# Patient Record
Sex: FEMALE | Race: WHITE | NOT HISPANIC OR LATINO | Employment: OTHER | URBAN - METROPOLITAN AREA
[De-identification: names, ages, dates, MRNs, and addresses within clinical notes are randomized per-mention and may not be internally consistent; named-entity substitution may affect disease eponyms.]

---

## 2018-02-21 ENCOUNTER — TRANSCRIBE ORDERS (OUTPATIENT)
Dept: ADMINISTRATIVE | Facility: HOSPITAL | Age: 68
End: 2018-02-21

## 2018-02-21 ENCOUNTER — HOSPITAL ENCOUNTER (OUTPATIENT)
Dept: CT IMAGING | Facility: HOSPITAL | Age: 68
Discharge: HOME | End: 2018-02-21
Admitting: INTERNAL MEDICINE

## 2018-02-21 DIAGNOSIS — K22.2 ESOPHAGEAL STRICTURE: Primary | ICD-10-CM

## 2018-02-21 DIAGNOSIS — K22.2 ESOPHAGEAL STRICTURE: ICD-10-CM

## 2018-02-21 PROCEDURE — 0 IOPAMIDOL 61 % SOLUTION: Performed by: INTERNAL MEDICINE

## 2018-02-21 PROCEDURE — 82565 ASSAY OF CREATININE: CPT

## 2018-02-21 PROCEDURE — 71260 CT THORAX DX C+: CPT

## 2018-02-21 RX ADMIN — IOPAMIDOL 75 ML: 612 INJECTION, SOLUTION INTRAVENOUS at 11:22

## 2018-02-23 LAB — CREAT BLDA-MCNC: 0.8 MG/DL (ref 0.6–1.3)

## 2018-05-10 ENCOUNTER — OFFICE VISIT (OUTPATIENT)
Dept: PULMONOLOGY | Facility: CLINIC | Age: 68
End: 2018-05-10

## 2018-05-10 VITALS
HEART RATE: 97 BPM | OXYGEN SATURATION: 96 % | BODY MASS INDEX: 17.99 KG/M2 | TEMPERATURE: 98.5 F | DIASTOLIC BLOOD PRESSURE: 74 MMHG | WEIGHT: 108 LBS | RESPIRATION RATE: 18 BRPM | HEIGHT: 65 IN | SYSTOLIC BLOOD PRESSURE: 116 MMHG

## 2018-05-10 DIAGNOSIS — J44.9 CHRONIC OBSTRUCTIVE PULMONARY DISEASE, UNSPECIFIED COPD TYPE (HCC): ICD-10-CM

## 2018-05-10 DIAGNOSIS — R06.02 SHORTNESS OF BREATH: Primary | ICD-10-CM

## 2018-05-10 DIAGNOSIS — R91.8 LUNG NODULES: ICD-10-CM

## 2018-05-10 DIAGNOSIS — R91.8 ABNORMAL CT LUNG SCREENING: ICD-10-CM

## 2018-05-10 PROCEDURE — 94060 EVALUATION OF WHEEZING: CPT | Performed by: INTERNAL MEDICINE

## 2018-05-10 PROCEDURE — 94726 PLETHYSMOGRAPHY LUNG VOLUMES: CPT | Performed by: INTERNAL MEDICINE

## 2018-05-10 PROCEDURE — 99205 OFFICE O/P NEW HI 60 MIN: CPT | Performed by: INTERNAL MEDICINE

## 2018-05-10 PROCEDURE — 94729 DIFFUSING CAPACITY: CPT | Performed by: INTERNAL MEDICINE

## 2018-05-10 RX ORDER — METOPROLOL TARTRATE 50 MG/1
50 TABLET, FILM COATED ORAL DAILY
Status: ON HOLD | COMMUNITY
Start: 2018-01-05 | End: 2020-03-21 | Stop reason: SDUPTHER

## 2018-05-10 RX ORDER — LEVALBUTEROL TARTRATE 45 UG/1
3 AEROSOL, METERED ORAL ONCE
Status: COMPLETED | OUTPATIENT
Start: 2018-05-10 | End: 2018-05-10

## 2018-05-10 RX ORDER — DIAZEPAM 5 MG/1
5 TABLET ORAL EVERY 8 HOURS PRN
COMMUNITY
Start: 2018-01-13

## 2018-05-10 RX ORDER — ASPIRIN 81 MG/1
81 TABLET ORAL EVERY 4 HOURS PRN
COMMUNITY
End: 2019-05-23

## 2018-05-10 RX ORDER — ALBUTEROL SULFATE 90 UG/1
2 AEROSOL, METERED RESPIRATORY (INHALATION) EVERY 4 HOURS PRN
COMMUNITY
Start: 2018-02-13 | End: 2018-11-06 | Stop reason: SDUPTHER

## 2018-05-10 RX ORDER — TRAZODONE HYDROCHLORIDE 100 MG/1
100 TABLET ORAL AS NEEDED
COMMUNITY
Start: 2018-01-21

## 2018-05-10 RX ORDER — ASCORBIC ACID 500 MG
500 TABLET ORAL 2 TIMES DAILY
COMMUNITY

## 2018-05-10 RX ADMIN — LEVALBUTEROL TARTRATE 3 PUFF: 45 AEROSOL, METERED ORAL at 10:02

## 2018-05-10 NOTE — PROGRESS NOTES
CC:    Lung nodules / COPD    HPI:    68 y/o WF w/ complex PMH:  120 py smoking quit 2010, COPD, NSCLC s/p resection RLL 2000 (presumeably early stage - no chemo/RT), Hodgkins Lymphoma 1980 s/p chest RT and spleenectomy, Ovarian Ca 1975 s/p RT, hypothyroidism, achalasia referred by Dr. Pelletier for lung nodules.  She recently had an esophageal dilation followed by CT chest in Feb that showed achalasia.  In addition the CT scan showed several sub-centimeter scattered nodules.  Patient quit smoking as mentioned above in 2010.  She does report significant ZHONG with minimal activity (showering, dressing, ADL's, walking up steps).  Does ok walking on a flat surface.  No other complaints.    PMH:    Past Medical History:   Diagnosis Date   • Cancer     lung   • Hodgkin's disease    • Hypertension    • Lung cancer    • Ovarian cancer      PSH:    Past Surgical History:   Procedure Laterality Date   • CATARACT EXTRACTION     • CATARACT EXTRACTION W/ INTRAOCULAR LENS IMPLANTW/ TRABECULECTOMY     • HIP ARTHROSCOPY     • HYSTERECTOMY     • LUNG SURGERY     • SPLENECTOMY     • THYROIDECTOMY       FH:    Family History   Problem Relation Age of Onset   • Cancer Mother    • Heart disease Father    • Heart disease Brother    • Cancer Maternal Grandmother      SH:    Social History     Social History   • Marital status:      Spouse name: N/A   • Number of children: N/A   • Years of education: N/A     Occupational History   • Not on file.     Social History Main Topics   • Smoking status: Former Smoker     Quit date: 5/10/2010   • Smokeless tobacco: Never Used   • Alcohol use Yes   • Drug use: No   • Sexual activity: Defer     Other Topics Concern   • Not on file     Social History Narrative   • No narrative on file     ALLERGIES:    No Known Allergies  MEDICATIONS:      Current Outpatient Prescriptions:   •  aspirin 81 MG EC tablet, Take 81 mg by mouth Every 4 (Four) Hours As Needed., Disp: , Rfl:   •  diazePAM (VALIUM) 5 MG  tablet, if needed., Disp: , Rfl:   •  metoprolol tartrate (LOPRESSOR) 50 MG tablet, Take 25 mg by mouth Daily., Disp: , Rfl:   •  traZODone (DESYREL) 100 MG tablet, Take 100 mg by mouth As Needed., Disp: , Rfl:   •  VENTOLIN  (90 Base) MCG/ACT inhaler, Inhale 2 puffs Every 4 (Four) Hours As Needed., Disp: , Rfl:   •  vitamin C (ASCORBIC ACID) 500 MG tablet, Take 500 mg by mouth 2 (Two) Times a Day., Disp: , Rfl:   No current facility-administered medications for this visit.   ROS:  Per HPI, otherwise all systems reviewed and negative.    DIAGNOSTIC DATA (Reviewed and interpreted by me unless otherwise specified):    PFT 5/10/18 - severe obstruction, no change w/ BD, air trapping, normal DLCO    CT Chest 2/21/18 - prior RLL lobectomy, radiation changes near mediastinum bilateral upper lobes, emphysema, multiple sub-centimeter nodules, achalasia w/ fluid filled esophagus, prior spleenectomy    Vitals:    05/10/18 1018   BP: 116/74   Pulse: 97   Resp: 18   Temp: 98.5 °F (36.9 °C)   SpO2: 96%       Physical Exam   Constitutional: Oriented to person, place, and time. Appears well-developed and well-nourished.   Head: Normocephalic and atraumatic.   Nose: Nose normal.   Mouth/Throat: Oropharynx is clear and moist.   Eyes: Conjunctivae are normal.  Pupils normal.  Neck: No tracheal deviation present.   Cardiovascular: Normal rate, regular rhythm, normal heart sounds and intact distal pulses.  Exam reveals no gallop and no friction rub.  No thrill.  No JVD.  No edema.  No murmur heard.  Pulmonary/Chest: Effort normal and breath sounds normal.  No tenderness to palpation.  No clubbing.   Abdominal: Soft. Bowel sounds are normal. No distension. No tenderness. There is no guarding.   Musculoskeletal: Normal range of motion.  No tenderness.  Lymphadenopathy:  No cervical adenopathy.   Neurological:  No new focal neurological deficits observed   Skin: Skin is warm and dry. No rash noted.   Psychiatric: Normal mood and  affect.  Behavior is normal. Judgment normal.    Assessment/Plan     1)  GOLD 3C COPD - start stiolto, already quit smoking, sat 96% on RA, doubt she needs any O2 w/ exertion / sleep.  High risk for pneumonia w/ achalasia and fluid filled esophagus.    2)  Lung Nodules - sub-centimeter, largest 6 mm, prior lung cancer, repeat CT now (3 months) and again in 6 months.  RTC after 6 month scan.      RTC 6 months, will call if any changes on 3 month CT    Gage Collier MD  Pulmonology and Critical Care Medicine  05/10/18 10:49 AM  Electronically Signed    C.C.:  Erik Pelletier MD, No Known Provider

## 2018-05-11 DIAGNOSIS — R91.1 LUNG NODULE: Primary | ICD-10-CM

## 2018-08-08 ENCOUNTER — HOSPITAL ENCOUNTER (OUTPATIENT)
Dept: CT IMAGING | Facility: HOSPITAL | Age: 68
Discharge: HOME | End: 2018-08-08
Attending: INTERNAL MEDICINE | Admitting: INTERNAL MEDICINE

## 2018-08-08 DIAGNOSIS — R91.1 LUNG NODULE: ICD-10-CM

## 2018-08-08 PROCEDURE — 71250 CT THORAX DX C-: CPT

## 2018-08-14 ENCOUNTER — OFFICE VISIT (OUTPATIENT)
Dept: PULMONOLOGY | Facility: CLINIC | Age: 68
End: 2018-08-14

## 2018-08-14 VITALS
HEIGHT: 65 IN | HEART RATE: 117 BPM | OXYGEN SATURATION: 96 % | SYSTOLIC BLOOD PRESSURE: 136 MMHG | WEIGHT: 117 LBS | DIASTOLIC BLOOD PRESSURE: 84 MMHG | BODY MASS INDEX: 19.49 KG/M2 | TEMPERATURE: 99.1 F | RESPIRATION RATE: 20 BRPM

## 2018-08-14 DIAGNOSIS — Z85.118 HISTORY OF LUNG CANCER: ICD-10-CM

## 2018-08-14 DIAGNOSIS — J98.4 CAVITARY LESION OF LUNG: Primary | ICD-10-CM

## 2018-08-14 DIAGNOSIS — J44.9 CHRONIC OBSTRUCTIVE PULMONARY DISEASE, UNSPECIFIED COPD TYPE (HCC): ICD-10-CM

## 2018-08-14 PROBLEM — K22.0 ACHALASIA: Status: ACTIVE | Noted: 2018-05-18

## 2018-08-14 PROCEDURE — 99214 OFFICE O/P EST MOD 30 MIN: CPT | Performed by: NURSE PRACTITIONER

## 2018-08-14 NOTE — PROGRESS NOTES
[facility] Pulmonary Follow up    CHIEF COMPLAINT    Abnormal CT scan, COPD    HISTORY OF PRESENT ILLNESS    Ita Pickett is a 67 y.o.female  former smoker with COPD and a history of non-small cell lung cancer status post resection right lower lobe 2000 here today for follow-up after abnormal CT of the chest.    She had esophageal dilation for achalasia earlier this year.  She underwent a CT of the chest in February afterwards which showed several subcentimeter scattered nodules and was then referred to our office where she saw Dr. Gage Collier.  She underwent a 6 month follow-up CT of the chest and returns today to discuss results.    Since her last visit in office, Dr. Pelletier has referred her to [facility].  She has undergone several attempts at a repeat esophageal dilation.  They have been unsuccessful and she currently has a PEG tube in place.    She remains on Stiolto but does rely on samples.  She still has dyspnea with even minimal activity.    Patient Active Problem List   Diagnosis   • COPD (chronic obstructive pulmonary disease) (CMS/HCC)   • Achalasia   • History of lung cancer       No Known Allergies    Current Outpatient Prescriptions:   •  aspirin 81 MG EC tablet, Take 81 mg by mouth Every 4 (Four) Hours As Needed., Disp: , Rfl:   •  diazePAM (VALIUM) 5 MG tablet, if needed., Disp: , Rfl:   •  metoprolol tartrate (LOPRESSOR) 50 MG tablet, Take 25 mg by mouth Daily., Disp: , Rfl:   •  tiotropium bromide-olodaterol (STIOLTO RESPIMAT) 2.5-2.5 MCG/ACT aerosol solution inhaler, Inhale 2 puffs Daily., Disp: 1 inhaler, Rfl: 11  •  traZODone (DESYREL) 100 MG tablet, Take 100 mg by mouth As Needed., Disp: , Rfl:   •  VENTOLIN  (90 Base) MCG/ACT inhaler, Inhale 2 puffs Every 4 (Four) Hours As Needed., Disp: , Rfl:   •  vitamin C (ASCORBIC ACID) 500 MG tablet, Take 500 mg by mouth 2 (Two) Times a Day., Disp: , Rfl:   MEDICATION LIST AND ALLERGIES REVIEWED.    Social History   Substance Use  "Topics   • Smoking status: Former Smoker     Quit date: 5/10/2010   • Smokeless tobacco: Never Used   • Alcohol use Yes       FAMILY AND SOCIAL HISTORY REVIEWED.    Review of Systems   Constitutional: Negative.    HENT: Positive for trouble swallowing.    Eyes: Negative.    Respiratory: Positive for shortness of breath.    Cardiovascular: Negative.    Genitourinary: Negative.    Musculoskeletal: Negative.    Skin: Negative.    Neurological: Negative.    Psychiatric/Behavioral: Negative for confusion, self-injury and suicidal ideas. The patient is nervous/anxious. The patient is not hyperactive.    .    /84 (BP Location: Left arm, Patient Position: Sitting, Cuff Size: Adult)   Pulse 117   Temp 99.1 °F (37.3 °C)   Resp 20   Ht 163.8 cm (64.5\")   Wt 53.1 kg (117 lb)   SpO2 96%   BMI 19.77 kg/m²     Immunization History   Administered Date(s) Administered   • Influenza, Unspecified 12/01/2017   • Pneumococcal Polysaccharide (PPSV23) 12/01/2017       Physical Exam   Constitutional: She is oriented to person, place, and time. She appears well-developed. No distress.   HENT:   Head: Normocephalic and atraumatic.   Neck: Neck supple.   Cardiovascular: Normal rate and regular rhythm.    No murmur heard.  Pulmonary/Chest: Effort normal. No stridor. No respiratory distress. She has no wheezes. She has no rales.   Abdominal: Soft. She exhibits no distension.   PEG tube in place   Musculoskeletal: Normal range of motion. She exhibits no edema.   Neurological: She is alert and oriented to person, place, and time.   Skin: Skin is warm and dry.   Psychiatric: She has a normal mood and affect. Her behavior is normal.   Vitals reviewed.        RESULTS    Ct Chest Without Contrast    Result Date: 8/8/2018  1. Status post right lower lobectomy. 2. New worrisome cavitary lesion in the base of the upper lobe with broad contact along the minor fissure measures up to 6.5 cm (anterior-posterior dimension). This would be " amenable to percutaneous sampling. 3. The other nodules are either unchanged (smaller solid lesion right upper lobe) or actually decreased in size (left upper lobe). 4. Other incidental findings as discussed.  D:  08/08/2018 E:  08/08/2018     This report was finalized on 8/8/2018 1:11 PM by Erik Sales.        PROBLEM LIST    Problem List Items Addressed This Visit        Respiratory    COPD (chronic obstructive pulmonary disease) (CMS/[facility])    Overview     Overview:   History: COPD.  PTA was on Stiolto inhalers  Assessment:   98% on room air  Plan:    - monitor sats  - Spiriva while inhouse  - cough and deep breath, Vibrapep  .            Other    History of lung cancer      Other Visit Diagnoses     Cavitary lesion of lung    -  Primary    Relevant Orders    CT Needle Biopsy Lung Right            DISCUSSION    Cavitary lesion- we reviewed her recent CT of the chest.  This is amenable to CT-guided biopsy.  We'll arrange for her to have this done hopefully this week or next as she does have to return to [facility] in 2 weeks.  She is quite anxious about the procedure.  She has Valium available at home but feels like this actually makes her more nervous. KARL report reviewed.  She was given a written prescription for Xanax 0.25 mg #2.     COPD- remain on Stiolto.  She was provided with several samples.    I'll follow up with her in about 2-3 weeks.  If this is not feasible due to her traveling schedule, I'll contact her with preliminary biopsy results.    I spent 25 minutes with the patient. I spent > 50% percent of this time counseling and discussing diagnosis, diagnostic testing, current status and treatment options.    EDWARDO Matamoros  08/14/20189:37 AM  Electronically signed     Please note that portions of this note were completed with a voice recognition program. Efforts were made to edit the dictations, but occasionally words are mistranscribed.      CC: Provider, No Known

## 2018-08-17 ENCOUNTER — HOSPITAL ENCOUNTER (OUTPATIENT)
Dept: CT IMAGING | Facility: HOSPITAL | Age: 68
Discharge: HOME | End: 2018-08-17
Admitting: NURSE PRACTITIONER

## 2018-08-17 VITALS
RESPIRATION RATE: 20 BRPM | HEIGHT: 65 IN | SYSTOLIC BLOOD PRESSURE: 146 MMHG | HEART RATE: 122 BPM | DIASTOLIC BLOOD PRESSURE: 90 MMHG | TEMPERATURE: 97.8 F | BODY MASS INDEX: 19.13 KG/M2 | OXYGEN SATURATION: 94 % | WEIGHT: 114.8 LBS

## 2018-08-17 DIAGNOSIS — J98.4 CAVITARY LESION OF LUNG: ICD-10-CM

## 2018-08-17 LAB
APTT PPP: 24.7 SECONDS (ref 24–31)
CREAT BLDA-MCNC: NORMAL MG/DL (ref 0.6–1.3)
INR PPP: 0.96 (ref 0.91–1.09)
PLATELET # BLD AUTO: 487 10*3/MM3 (ref 150–450)
PROTHROMBIN TIME: 10.1 SECONDS (ref 9.6–11.5)

## 2018-08-17 PROCEDURE — 85730 THROMBOPLASTIN TIME PARTIAL: CPT | Performed by: RADIOLOGY

## 2018-08-17 PROCEDURE — 85049 AUTOMATED PLATELET COUNT: CPT | Performed by: RADIOLOGY

## 2018-08-17 PROCEDURE — 77012 CT SCAN FOR NEEDLE BIOPSY: CPT

## 2018-08-17 PROCEDURE — 85610 PROTHROMBIN TIME: CPT | Performed by: RADIOLOGY

## 2018-08-17 RX ORDER — SODIUM CHLORIDE 0.9 % (FLUSH) 0.9 %
1-10 SYRINGE (ML) INJECTION AS NEEDED
Status: DISCONTINUED | OUTPATIENT
Start: 2018-08-17 | End: 2018-08-18 | Stop reason: HOSPADM

## 2018-08-17 RX ORDER — ALPRAZOLAM 0.25 MG/1
0.25 TABLET ORAL ONCE AS NEEDED
COMMUNITY
End: 2019-05-23

## 2018-08-17 NOTE — NURSING NOTE
Returned to room. No procedure done. Biopsy site improved per Dr Siegel and biopsy deferred. Pt denies complaints. Provided full liquids.

## 2018-08-17 NOTE — DISCHARGE INSTR - LAB
Keep all your scheduled appointments. Call Dr Collier Monday in the office if you haven't heard from him.

## 2018-08-21 ENCOUNTER — TELEPHONE (OUTPATIENT)
Dept: PULMONOLOGY | Facility: CLINIC | Age: 68
End: 2018-08-21

## 2018-08-21 NOTE — TELEPHONE ENCOUNTER
Discussed with Ms Pickett.  I let her know that I would like to have Dr. Collier review her scan and see if he feels like she needs an additional CT prior to her follow-up in November or if she needs a sooner follow-up appointment.  She was treated for pneumonia about 6 weeks ago.  She is curious if she needs additional antibiotics.  At this time she continues to be short of breath but has no additional symptoms such as fever, chills, or chest pain.  She has a cough which is occasionally productive but she is unsure if she'll be able to provide us with a sputum sample.  I let her know that I likely would not have answer from Dr. Collier until next week.  I will contact her with updates once available.      ----- Message from EDWARDO Espinoza sent at 8/20/2018  3:50 PM EDT -----  She did not have a biopsy on the 17th due to the nodule being smaller.  She now wants to know what she should do.  Will you call her and let her know what she should do now?    [phone]    Thanks.

## 2018-11-06 ENCOUNTER — OFFICE VISIT (OUTPATIENT)
Dept: PULMONOLOGY | Facility: CLINIC | Age: 68
End: 2018-11-06

## 2018-11-06 VITALS
DIASTOLIC BLOOD PRESSURE: 84 MMHG | RESPIRATION RATE: 18 BRPM | WEIGHT: 124.25 LBS | HEIGHT: 65 IN | BODY MASS INDEX: 20.7 KG/M2 | OXYGEN SATURATION: 96 % | SYSTOLIC BLOOD PRESSURE: 124 MMHG | TEMPERATURE: 98.7 F | HEART RATE: 86 BPM

## 2018-11-06 DIAGNOSIS — J44.1 COPD EXACERBATION (HCC): ICD-10-CM

## 2018-11-06 DIAGNOSIS — R91.8 LUNG NODULES: ICD-10-CM

## 2018-11-06 DIAGNOSIS — R06.02 SOB (SHORTNESS OF BREATH): Primary | ICD-10-CM

## 2018-11-06 DIAGNOSIS — J30.9 ALLERGIC RHINITIS, UNSPECIFIED SEASONALITY, UNSPECIFIED TRIGGER: ICD-10-CM

## 2018-11-06 PROCEDURE — 99214 OFFICE O/P EST MOD 30 MIN: CPT | Performed by: INTERNAL MEDICINE

## 2018-11-06 RX ORDER — ALBUTEROL SULFATE 90 UG/1
2 AEROSOL, METERED RESPIRATORY (INHALATION) EVERY 4 HOURS PRN
Qty: 1 INHALER | Refills: 11 | Status: SHIPPED | OUTPATIENT
Start: 2018-11-06 | End: 2019-05-23

## 2018-11-06 RX ORDER — MONTELUKAST SODIUM 10 MG/1
10 TABLET ORAL NIGHTLY
Qty: 30 TABLET | Refills: 11 | Status: SHIPPED | OUTPATIENT
Start: 2018-11-06 | End: 2019-02-25 | Stop reason: SDUPTHER

## 2018-11-06 RX ORDER — DOXYCYCLINE HYCLATE 50 MG/1
100 CAPSULE ORAL 2 TIMES DAILY
Qty: 20 CAPSULE | Refills: 0 | Status: SHIPPED | OUTPATIENT
Start: 2018-11-06 | End: 2018-11-11

## 2018-11-06 RX ORDER — PREDNISOLONE SODIUM PHOSPHATE 15 MG/5ML
40 SOLUTION ORAL DAILY
Qty: 75 ML | Refills: 0 | Status: SHIPPED | OUTPATIENT
Start: 2018-11-06 | End: 2019-02-25

## 2018-11-06 RX ORDER — OMEPRAZOLE 20 MG/1
20 CAPSULE, DELAYED RELEASE ORAL DAILY
COMMUNITY
Start: 2018-10-16 | End: 2022-08-17

## 2018-11-06 NOTE — PROGRESS NOTES
CC:    Lung nodules / COPD    HPI:    66 y/o WF w/ complex PMH:  120 py smoking quit 2010, COPD, NSCLC s/p resection RLL 2000 (presumeably early stage - no chemo/RT), Hodgkins Lymphoma 1980 s/p chest RT and spleenectomy, Ovarian Ca 1975 s/p RT, hypothyroidism, achalasia referred by Dr. Pelletier for lung nodules.  She recently had an esophageal dilation followed by CT chest in Feb that showed achalasia.  In addition the CT scan showed several sub-centimeter scattered nodules.  Patient quit smoking as mentioned above in 2010.  She does report significant ZHONG with minimal activity (showering, dressing, ADL's, walking up steps).  Does ok walking on a flat surface.  No other complaints.    INTERVAL HISTORY:    Patient returns for follow up.  Her repeat CT showed a chest wall based cavitary RML lesion.  Radiology recommended CT guided biopsy.  She went for this and the lesion decreased in size so the biopsy was cancelled.   She had a PEG placed for her severe esophageal stenosis and continues to get repeat dilations.  Today she complains of cough, congestion, wheezing, rhinorrhea, and sore throat.  No fevers.    PMH:    Past Medical History:   Diagnosis Date   • Cancer (CMS/HCC)     lung   • Dysrhythmia     tachycardia   • Hodgkin's disease (CMS/HCC)    • Hypertension    • Lung cancer (CMS/HCC)    • Ovarian cancer (CMS/HCC)      PSH:    Past Surgical History:   Procedure Laterality Date   • CATARACT EXTRACTION     • CATARACT EXTRACTION W/ INTRAOCULAR LENS IMPLANTW/ TRABECULECTOMY     • HIP ARTHROSCOPY     • HYSTERECTOMY     • LUNG SURGERY     • SPLENECTOMY     • THYROIDECTOMY       FH:    Family History   Problem Relation Age of Onset   • Cancer Mother    • Heart disease Father    • Heart disease Brother    • Cancer Maternal Grandmother      SH:    Social History     Social History   • Marital status:      Spouse name: N/A   • Number of children: N/A   • Years of education: N/A     Occupational History   • Not on  file.     Social History Main Topics   • Smoking status: Former Smoker     Quit date: 5/10/2010   • Smokeless tobacco: Never Used   • Alcohol use Yes   • Drug use: No   • Sexual activity: Defer     Other Topics Concern   • Not on file     Social History Narrative   • No narrative on file     ALLERGIES:    No Known Allergies  MEDICATIONS:      Current Outpatient Prescriptions:   •  ALPRAZolam (XANAX) 0.25 MG tablet, Take 0.25 mg by mouth 1 (One) Time As Needed for Anxiety (procedure)., Disp: , Rfl:   •  aspirin 81 MG EC tablet, Take 81 mg by mouth Every 4 (Four) Hours As Needed., Disp: , Rfl:   •  diazePAM (VALIUM) 5 MG tablet, if needed., Disp: , Rfl:   •  ibuprofen (ADVIL,MOTRIN) 100 MG/5ML suspension, Take  by mouth Every 8 (Eight) Hours As Needed for Mild Pain ., Disp: , Rfl:   •  metoprolol tartrate (LOPRESSOR) 50 MG tablet, Take 50 mg by mouth Daily., Disp: , Rfl:   •  omeprazole (priLOSEC) 20 MG capsule, Take 20 mg by mouth Daily., Disp: , Rfl:   •  tiotropium bromide-olodaterol (STIOLTO RESPIMAT) 2.5-2.5 MCG/ACT aerosol solution inhaler, Inhale 2 puffs Daily., Disp: 1 inhaler, Rfl: 11  •  traZODone (DESYREL) 100 MG tablet, Take 100 mg by mouth As Needed., Disp: , Rfl:   •  VENTOLIN  (90 Base) MCG/ACT inhaler, Inhale 2 puffs Every 4 (Four) Hours As Needed for Wheezing or Shortness of Air., Disp: 1 inhaler, Rfl: 11  •  vitamin C (ASCORBIC ACID) 500 MG tablet, Take 500 mg by mouth 2 (Two) Times a Day., Disp: , Rfl:   •  doxycycline (VIBRAMYCIN) 50 MG capsule, Take 2 capsules by mouth 2 (Two) Times a Day for 5 days., Disp: 20 capsule, Rfl: 0  •  montelukast (SINGULAIR) 10 MG tablet, Take 1 tablet by mouth Every Night., Disp: 30 tablet, Rfl: 11  •  prednisoLONE (ORAPRED) 15 MG/5ML solution, Take 13.3 mL by mouth Daily., Disp: 75 mL, Rfl: 0  ROS:  Per HPI, otherwise all systems reviewed and negative.    DIAGNOSTIC DATA (Reviewed and interpreted by me unless otherwise specified):    PFT 5/10/18 - severe  obstruction, no change w/ BD, air trapping, normal DLCO    CT Chest 2/21/18 - prior RLL lobectomy, radiation changes near mediastinum bilateral upper lobes, emphysema, multiple sub-centimeter nodules, achalasia w/ fluid filled esophagus, prior spleenectomy    CXR 11/6/18 - prior lobectomy, improving RML nodule, no infiltrates    Vitals:    11/06/18 0919   BP: 124/84   Pulse: 86   Resp: 18   Temp: 98.7 °F (37.1 °C)   SpO2: 96%       Physical Exam   Constitutional: Oriented to person, place, and time. Appears well-developed and well-nourished.   Head: Normocephalic and atraumatic.   Nose: Nose normal.   Mouth/Throat: Oropharynx w/ posterior cobblestoning.   Eyes: Conjunctivae are normal.  Pupils normal.  Neck: No tracheal deviation present.   Cardiovascular: Normal rate, regular rhythm, normal heart sounds and intact distal pulses.  Exam reveals no gallop and no friction rub.  No thrill.  No JVD.  No edema.  No murmur heard.  Pulmonary/Chest: Effort normal and breath sounds w/ wheezing bilaterally.  No tenderness to palpation.  No clubbing.   Abdominal: Soft. Bowel sounds are normal. No distension. No tenderness. There is no guarding.   Musculoskeletal: Normal range of motion.  No tenderness.  Lymphadenopathy:  No cervical adenopathy.   Neurological:  No new focal neurological deficits observed   Skin: Skin is warm and dry. No rash noted.   Psychiatric: Normal mood and affect.  Behavior is normal. Judgment normal.    Assessment/Plan     1)  GOLD 3C COPD - continue stiolto, already quit smoking, sat 96% on RA, doubt she needs any O2 w/ exertion / sleep.  High risk for pneumonia w/ achalasia and fluid filled esophagus.  For Acute exacerbation give prednisolone and doxycycline x 5 days.    2)  Lung Nodules  / RML pneumonia - sub-centimeter, largest 6 mm, prior lung cancer, also w/ improving RML process.  Repeat CT in Feb 2019.    3)  Allergic Rhinitis - flonase, anti-histamine, singulair    RTC 3 months w/ CT    Gage  MD Nando  Pulmonology and Critical Care Medicine  11/06/18 9:58 AM  Electronically Signed    C.C.:  No ref. provider found, Provider, No Known

## 2018-11-07 DIAGNOSIS — R91.8 LUNG NODULES: Primary | ICD-10-CM

## 2019-02-06 ENCOUNTER — HOSPITAL ENCOUNTER (OUTPATIENT)
Dept: CT IMAGING | Facility: HOSPITAL | Age: 69
Discharge: HOME | End: 2019-02-06
Attending: INTERNAL MEDICINE | Admitting: INTERNAL MEDICINE

## 2019-02-06 DIAGNOSIS — R91.8 LUNG NODULES: ICD-10-CM

## 2019-02-06 PROCEDURE — 71250 CT THORAX DX C-: CPT

## 2019-02-25 ENCOUNTER — OFFICE VISIT (OUTPATIENT)
Dept: PULMONOLOGY | Facility: CLINIC | Age: 69
End: 2019-02-25

## 2019-02-25 VITALS
BODY MASS INDEX: 20.22 KG/M2 | DIASTOLIC BLOOD PRESSURE: 62 MMHG | HEART RATE: 87 BPM | OXYGEN SATURATION: 95 % | WEIGHT: 121.38 LBS | SYSTOLIC BLOOD PRESSURE: 112 MMHG | TEMPERATURE: 98 F | RESPIRATION RATE: 18 BRPM | HEIGHT: 65 IN

## 2019-02-25 DIAGNOSIS — J44.9 CHRONIC OBSTRUCTIVE PULMONARY DISEASE, UNSPECIFIED COPD TYPE (HCC): ICD-10-CM

## 2019-02-25 DIAGNOSIS — R91.8 LUNG NODULES: Primary | ICD-10-CM

## 2019-02-25 PROCEDURE — 99214 OFFICE O/P EST MOD 30 MIN: CPT | Performed by: INTERNAL MEDICINE

## 2019-02-25 RX ORDER — MONTELUKAST SODIUM 10 MG/1
10 TABLET ORAL NIGHTLY
Qty: 30 TABLET | Refills: 11 | Status: SHIPPED | OUTPATIENT
Start: 2019-02-25 | End: 2020-03-04 | Stop reason: SDUPTHER

## 2019-02-25 NOTE — PROGRESS NOTES
CC:    Lung nodules / COPD    HPI:    68 y/o WF w/ complex PMH:  120 py smoking quit 2010, COPD, NSCLC s/p resection RLL 2000 (presumeably early stage - no chemo/RT), Hodgkins Lymphoma 1980 s/p chest RT and spleenectomy, Ovarian Ca 1975 s/p RT, hypothyroidism, achalasia referred by Dr. Pelletier for lung nodules.  She recently had an esophageal dilation followed by CT chest in Feb that showed achalasia.  In addition the CT scan showed several sub-centimeter scattered nodules.  Patient quit smoking as mentioned above in 2010.  She does report significant ZHONG with minimal activity (showering, dressing, ADL's, walking up steps).  Does ok walking on a flat surface.  No other complaints.    Repeat CT showed a chest wall based cavitary RML lesion.  Radiology recommended CT guided biopsy.  She went for this and the lesion decreased in size so the biopsy was cancelled.   She had a PEG placed for her severe esophageal stenosis and continues to get repeat dilations.    INTERVAL HISTORY:    Patient returns for follow up.  Allergies are well controlled.  Breathing ok with Stiolto.  She was just diagnosed with right sided breast cancer and is being evaluated for surgery.  Comes today w/ repeat CT.    PMH:    Past Medical History:   Diagnosis Date   • Cancer (CMS/HCC)     lung   • Dysrhythmia     tachycardia   • Hodgkin's disease (CMS/HCC)    • Hypertension    • Lung cancer (CMS/HCC)    • Ovarian cancer (CMS/HCC)      PSH:    Past Surgical History:   Procedure Laterality Date   • CATARACT EXTRACTION     • CATARACT EXTRACTION W/ INTRAOCULAR LENS IMPLANTW/ TRABECULECTOMY     • HIP ARTHROSCOPY     • HYSTERECTOMY     • LUNG SURGERY     • SPLENECTOMY     • THYROIDECTOMY       FH:    Family History   Problem Relation Age of Onset   • Cancer Mother    • Heart disease Father    • Heart disease Brother    • Cancer Maternal Grandmother      SH:    Social History     Socioeconomic History   • Marital status:      Spouse name: Not on  file   • Number of children: Not on file   • Years of education: Not on file   • Highest education level: Not on file   Social Needs   • Financial resource strain: Not on file   • Food insecurity - worry: Not on file   • Food insecurity - inability: Not on file   • Transportation needs - medical: Not on file   • Transportation needs - non-medical: Not on file   Occupational History   • Not on file   Tobacco Use   • Smoking status: Former Smoker     Last attempt to quit: 5/10/2010     Years since quittin.8   • Smokeless tobacco: Never Used   Substance and Sexual Activity   • Alcohol use: Yes   • Drug use: No   • Sexual activity: Defer   Other Topics Concern   • Not on file   Social History Narrative   • Not on file     ALLERGIES:    No Known Allergies  MEDICATIONS:      Current Outpatient Medications:   •  ALPRAZolam (XANAX) 0.25 MG tablet, Take 0.25 mg by mouth 1 (One) Time As Needed for Anxiety (procedure)., Disp: , Rfl:   •  aspirin 81 MG EC tablet, Take 81 mg by mouth Every 4 (Four) Hours As Needed., Disp: , Rfl:   •  diazePAM (VALIUM) 5 MG tablet, if needed., Disp: , Rfl:   •  ibuprofen (ADVIL,MOTRIN) 100 MG/5ML suspension, Take  by mouth Every 8 (Eight) Hours As Needed for Mild Pain ., Disp: , Rfl:   •  metoprolol tartrate (LOPRESSOR) 50 MG tablet, Take 50 mg by mouth Daily., Disp: , Rfl:   •  montelukast (SINGULAIR) 10 MG tablet, Take 1 tablet by mouth Every Night., Disp: 30 tablet, Rfl: 11  •  omeprazole (priLOSEC) 20 MG capsule, Take 20 mg by mouth Daily., Disp: , Rfl:   •  tiotropium bromide-olodaterol (STIOLTO RESPIMAT) 2.5-2.5 MCG/ACT aerosol solution inhaler, Inhale 2 puffs Daily., Disp: 1 inhaler, Rfl: 11  •  traZODone (DESYREL) 100 MG tablet, Take 100 mg by mouth As Needed., Disp: , Rfl:   •  VENTOLIN  (90 Base) MCG/ACT inhaler, Inhale 2 puffs Every 4 (Four) Hours As Needed for Wheezing or Shortness of Air., Disp: 1 inhaler, Rfl: 11  •  vitamin C (ASCORBIC ACID) 500 MG tablet, Take 500 mg by  mouth 2 (Two) Times a Day., Disp: , Rfl:   ROS:  Per HPI, otherwise all systems reviewed and negative.    DIAGNOSTIC DATA (Reviewed and interpreted by me unless otherwise specified):    PFT 5/10/18 - severe obstruction, no change w/ BD, air trapping, normal DLCO    CT Chest 2/21/18 - prior RLL lobectomy, radiation changes near mediastinum bilateral upper lobes, emphysema, multiple sub-centimeter nodules, achalasia w/ fluid filled esophagus, prior spleenectomy    CT Chest 2/6/19 - stable from prior, sub-centimeter nodules stable    CXR 11/6/18 - prior lobectomy, improving RML nodule, no infiltrates    Vitals:    02/25/19 1351   BP: 112/62   Pulse: 87   Resp: 18   Temp: 98 °F (36.7 °C)   SpO2: 95%       Physical Exam   Constitutional: Oriented to person, place, and time. Appears well-developed and well-nourished.   Head: Normocephalic and atraumatic.   Nose: Nose normal.   Mouth/Throat: normal.  Eyes: Conjunctivae are normal.  Pupils normal.  Neck: No tracheal deviation present.   Cardiovascular: Normal rate, regular rhythm, normal heart sounds and intact distal pulses.  Exam reveals no gallop and no friction rub.  No thrill.  No JVD.  No edema.  No murmur heard.  Pulmonary/Chest: Effort normal and breath sounds clear.  No tenderness to palpation.  No clubbing.   Abdominal: Soft. Bowel sounds are normal. No distension. No tenderness. There is no guarding.   Musculoskeletal: Normal range of motion.  No tenderness.  Lymphadenopathy:  No cervical adenopathy.   Neurological:  No new focal neurological deficits observed   Skin: Skin is warm and dry. No rash noted.   Psychiatric: Normal mood and affect.  Behavior is normal. Judgment normal.    Assessment/Plan     1)  GOLD 3C COPD - continue stiolto, already quit smoking.  High risk for pneumonia w/ achalasia and fluid filled esophagus.    2)  Lung Nodules - sub-centimeter, follow for 2 years w/ final CT 2/2020.    3)  Allergic Rhinitis - flonase, anti-histamine,  singulair    RTC 1 year w/ CT    Gage Collier MD  Pulmonology and Critical Care Medicine  02/25/19 2:30 PM  Electronically Signed    C.C.:  No ref. provider found, Lucho Peters MD

## 2019-05-23 ENCOUNTER — OFFICE VISIT (OUTPATIENT)
Dept: RADIATION ONCOLOGY | Facility: HOSPITAL | Age: 69
End: 2019-05-23

## 2019-05-23 ENCOUNTER — HOSPITAL ENCOUNTER (OUTPATIENT)
Dept: RADIATION ONCOLOGY | Facility: HOSPITAL | Age: 69
Setting detail: RADIATION/ONCOLOGY SERIES
Discharge: HOME | End: 2019-05-23

## 2019-05-23 VITALS
BODY MASS INDEX: 20.72 KG/M2 | RESPIRATION RATE: 20 BRPM | HEART RATE: 87 BPM | DIASTOLIC BLOOD PRESSURE: 61 MMHG | TEMPERATURE: 98.8 F | WEIGHT: 122.6 LBS | OXYGEN SATURATION: 98 % | SYSTOLIC BLOOD PRESSURE: 138 MMHG

## 2019-05-23 DIAGNOSIS — Z17.0 MALIGNANT NEOPLASM OF UPPER-OUTER QUADRANT OF RIGHT BREAST IN FEMALE, ESTROGEN RECEPTOR POSITIVE (HCC): Primary | ICD-10-CM

## 2019-05-23 DIAGNOSIS — C50.411 MALIGNANT NEOPLASM OF UPPER-OUTER QUADRANT OF RIGHT BREAST IN FEMALE, ESTROGEN RECEPTOR POSITIVE (HCC): Primary | ICD-10-CM

## 2019-05-23 PROBLEM — C50.919 BREAST CANCER: Status: ACTIVE | Noted: 2019-05-23

## 2019-05-23 PROCEDURE — G0463 HOSPITAL OUTPT CLINIC VISIT: HCPCS

## 2019-05-23 NOTE — PROGRESS NOTES
CONSULTATION NOTE      :                                                          1950  DATE OF CONSULTATION:                       2019   REQUESTING PHYSICIAN:                   Anatoly Jay MD  REASON FOR CONSULTATION:           Breast cancer (CMS/[facility])  Staging form: Breast, AJCC 8th Edition  - Clinical stage from 2018: Stage 0 (cTis (DCIS), cN0, cM0, ER: Positive, VA: Positive, HER2: Unknown) - Signed by Nacho Hanna MD on 2019  - Pathologic stage from 2019: Stage 0 (pTis (DCIS), pN0, cM0, ER: Positive, VA: Positive, HER2: Unknown) - Signed by Nacho Hanna MD on 2019       BRIEF HISTORY:  The patient is a very pleasant 68 y.o. female  with recently diagnosed intraductal breast cancer.  This is her fourth malignancy.   she underwent surgery and pelvic irradiation for ovarian cancer.   she underwent chemotherapy and mantle field irradiation at the [facility] for Hodgkin's disease.   she underwent right lower lobectomy for early stage lung cancer and did not require adjuvant treatment.  She does have chronic postradiation changes of esophageal stricture requiring feeding tube.  Screening mammography 2018 revealed a new 6 mm area of asymmetry in the upper outer quadrant of the right breast.  Ultrasound confirmed 5 mm suspicious hypoechoic lesion at 10:00 in the right breast.  Ultrasound-guided biopsy 2019 showed 6 mm focus of low-grade DCIS.  Breast conserving lumpectomy was performed 2019.  There was a residual 3 x 2 mm focus of intermediate grade DCIS with no invasive component.  Final surgical margins were clear by 5 mm.  Hormone receptors were positive.  Oncotype DCIS score was 23.  She was given a trial of tamoxifen but discontinued after 1 week due to symptoms of nausea and vomiting.  She was referred for consideration of adjuvant radiotherapy as part of breast conservation treatment.    No Known Allergies    Social History      Socioeconomic History   • Marital status:      Spouse name: Not on file   • Number of children: Not on file   • Years of education: Not on file   • Highest education level: Not on file   Tobacco Use   • Smoking status: Former Smoker     Packs/day: 3.00     Types: Cigarettes     Last attempt to quit: 5/10/2010     Years since quittin.0   • Smokeless tobacco: Never Used   Substance and Sexual Activity   • Alcohol use: Yes     Alcohol/week: 1.2 oz     Types: 2 Shots of liquor per week     Comment: 2 drinks per day   • Drug use: Yes     Types: Marijuana     Comment: brownie   • Sexual activity: Defer       Past Medical History:   Diagnosis Date   • Breast cancer (CMS/HCC)    • Cancer (CMS/HCC)     lung   • COPD (chronic obstructive pulmonary disease) (CMS/HCC)    • Disease of thyroid gland    • Dysrhythmia     tachycardia   • Frequent PVCs     bigeminal pvc   • Hodgkin's disease (CMS/HCC)    • Hypertension    • Lung cancer (CMS/HCC)    • Ovarian cancer (CMS/HCC)        family history includes Breast cancer in her maternal grandmother; Cancer in her mother; Heart disease in her brother and father; Lung cancer in her mother.     Past Surgical History:   Procedure Laterality Date   • BREAST LUMPECTOMY     • CATARACT EXTRACTION     • CATARACT EXTRACTION W/ INTRAOCULAR LENS IMPLANTW/ TRABECULECTOMY     • COLONOSCOPY      limited due to scar tissue more thatn 10 years ago   • GASTROSTOMY FEEDING TUBE INSERTION      [facility]   • HIP ARTHROSCOPY Right     total hip replacement    • HYSTERECTOMY     • LUNG SURGERY     • RETINAL DETACHMENT REPAIR     • SPLENECTOMY  1980   • THYROIDECTOMY  2012    partial   • UPPER GASTROINTESTINAL ENDOSCOPY  2018 x 3    esophageal strictures by [facility]        Review of Systems   Constitutional: Positive for appetite change and fatigue.   HENT:  Negative.    Eyes: Negative.    Respiratory: Positive for chest tightness, cough, shortness of breath and  wheezing.    Cardiovascular: Negative.    Gastrointestinal: Positive for nausea.   Endocrine: Positive for hot flashes (night sweats).   Genitourinary: Negative.     Musculoskeletal: Positive for back pain, neck pain and neck stiffness.   Skin: Negative.    Neurological: Positive for dizziness and light-headedness.   Hematological: Negative.    Psychiatric/Behavioral: Negative.            Objective   VITAL SIGNS:   Vitals:    05/23/19 0838   BP: 138/61   Pulse: 87   Resp: 20   Temp: 98.8 °F (37.1 °C)   TempSrc: Temporal   SpO2: 98%   Weight: 55.6 kg (122 lb 9.6 oz)   PainSc:   4   PainLoc: Back        Karnofsky score: 80        Physical Exam   Constitutional: She is oriented to person, place, and time. She appears well-developed and well-nourished.   HENT:   Head: Normocephalic and atraumatic.   Neck: Normal range of motion. Neck supple.   Cardiovascular: Normal rate, regular rhythm and normal heart sounds.   No murmur heard.  Pulmonary/Chest: Effort normal and breath sounds normal. She has no wheezes. She has no rales. Right breast exhibits skin change. Right breast exhibits no inverted nipple, no mass, no nipple discharge and no tenderness.       Abdominal: Soft. Bowel sounds are normal. She exhibits no distension. There is no hepatosplenomegaly. There is no tenderness.   Feeding tube present.   Musculoskeletal: Normal range of motion. She exhibits deformity ( Scoliosis). She exhibits no edema or tenderness.   Lymphadenopathy:     She has no cervical adenopathy.     She has no axillary adenopathy.        Right: No supraclavicular adenopathy present.        Left: No supraclavicular adenopathy present.   Neurological: She is alert and oriented to person, place, and time. She has normal strength. No sensory deficit.   Skin: Skin is warm and dry.   Psychiatric: She has a normal mood and affect. Her behavior is normal. Judgment and thought content normal.   Nursing note and vitals reviewed.           The following  portions of the patient's history were reviewed and updated as appropriate: allergies, current medications, past family history, past medical history, past social history, past surgical history and problem list.    Assessment      Ductal carcinoma in situ of the upper outer quadrant of the right breast, stage 0 (Tis, N0, M0).  This is her fourth primary neoplasm, in remission from ovarian cancer, Hodgkin's disease and resected right lung cancer.  She has history of receivig mantle field radiation and pelvic irradiation with long-term sequela from those treatments.  She is understandably reluctant to undergo additional radiotherapy.  She does have hormone receptor positive disease and was given a trial of tamoxifen but discontinued due to side effects.  Adjuvant radiotherapy to the lumpectomy bed is recommended, especially since she does not tolerate hormonal therapy, and would offer benefit of local tumor control.  Prognosis should still be excellent.    RECOMMENDATIONS: Accelerated partial breast irradiation would be most appropriate for her, and is an appropriate option listed in NCCN guidelines.  Partial breast treatment would be preferred over whole breast radiation for this patient since we will be able to avoid significant overlap with previous radiotherapy fields.  Patient also would prefer the shorter treatment course with APBI.  Since she does not already have a MammoSite balloon placed, externally directed partial breast irradiation can be used.  Lumpectomy bed will receive a dose of 38.5 Gy and 10 fractions, twice daily over 1 week.  She will return for CT simulation in a couple weeks, after she enjoys an out-of-town visit from a good friend.  (Due to the increased complexity of the radiation treatments, based on re-irradiation of adjacent tissues which may entail delivering doses that exceed the normal tissue tolerance and to minimize significantly higher risk for toxicity, the expertise of a medical  physicist will be requested to assist in the treatment planning process and generate a thorough written analysis confirming the appropriate radiation dosimetry.)    Return in about 2 weeks (around 6/7/2019) for Simulation.  Ita was seen today for breast cancer.    Diagnoses and all orders for this visit:    Malignant neoplasm of upper-outer quadrant of right breast in female, estrogen receptor positive (CMS/HCC)         Nacoh Hanna MD

## 2019-06-07 ENCOUNTER — HOSPITAL ENCOUNTER (OUTPATIENT)
Dept: RADIATION ONCOLOGY | Facility: HOSPITAL | Age: 69
Discharge: HOME | End: 2019-06-07

## 2019-06-07 ENCOUNTER — HOSPITAL ENCOUNTER (OUTPATIENT)
Dept: RADIATION ONCOLOGY | Facility: HOSPITAL | Age: 69
Setting detail: RADIATION/ONCOLOGY SERIES
Discharge: HOME | End: 2019-06-07

## 2019-06-07 PROCEDURE — 77290 THER RAD SIMULAJ FIELD CPLX: CPT | Performed by: RADIOLOGY

## 2019-06-07 PROCEDURE — 77332 RADIATION TREATMENT AID(S): CPT | Performed by: RADIOLOGY

## 2019-06-17 PROCEDURE — 77301 RADIOTHERAPY DOSE PLAN IMRT: CPT | Performed by: RADIOLOGY

## 2019-06-17 PROCEDURE — 77338 DESIGN MLC DEVICE FOR IMRT: CPT | Performed by: RADIOLOGY

## 2019-06-17 PROCEDURE — 77300 RADIATION THERAPY DOSE PLAN: CPT | Performed by: RADIOLOGY

## 2019-06-18 PROCEDURE — 77370 RADIATION PHYSICS CONSULT: CPT | Performed by: RADIOLOGY

## 2019-06-26 ENCOUNTER — HOSPITAL ENCOUNTER (OUTPATIENT)
Dept: RADIATION ONCOLOGY | Facility: HOSPITAL | Age: 69
Discharge: HOME | End: 2019-06-26

## 2019-06-26 PROCEDURE — 77385: CPT | Performed by: RADIOLOGY

## 2019-06-27 ENCOUNTER — HOSPITAL ENCOUNTER (OUTPATIENT)
Dept: RADIATION ONCOLOGY | Facility: HOSPITAL | Age: 69
Discharge: HOME | End: 2019-06-27

## 2019-06-27 VITALS — WEIGHT: 119.6 LBS | BODY MASS INDEX: 20.21 KG/M2

## 2019-06-27 PROCEDURE — 77385: CPT | Performed by: RADIOLOGY

## 2019-06-28 ENCOUNTER — HOSPITAL ENCOUNTER (OUTPATIENT)
Dept: RADIATION ONCOLOGY | Facility: HOSPITAL | Age: 69
Discharge: HOME | End: 2019-06-28

## 2019-06-28 PROCEDURE — 77385: CPT | Performed by: RADIOLOGY

## 2019-06-28 PROCEDURE — 77336 RADIATION PHYSICS CONSULT: CPT | Performed by: RADIOLOGY

## 2019-07-01 ENCOUNTER — HOSPITAL ENCOUNTER (OUTPATIENT)
Dept: RADIATION ONCOLOGY | Facility: HOSPITAL | Age: 69
Setting detail: RADIATION/ONCOLOGY SERIES
Discharge: HOME | End: 2019-07-01

## 2019-07-01 ENCOUNTER — HOSPITAL ENCOUNTER (OUTPATIENT)
Dept: RADIATION ONCOLOGY | Facility: HOSPITAL | Age: 69
Discharge: HOME | End: 2019-07-01

## 2019-07-01 VITALS — BODY MASS INDEX: 20.42 KG/M2 | WEIGHT: 120.8 LBS

## 2019-07-01 PROCEDURE — 77385: CPT | Performed by: RADIOLOGY

## 2019-07-02 ENCOUNTER — HOSPITAL ENCOUNTER (OUTPATIENT)
Dept: RADIATION ONCOLOGY | Facility: HOSPITAL | Age: 69
Discharge: HOME | End: 2019-07-02

## 2019-07-02 PROCEDURE — 77385: CPT | Performed by: RADIOLOGY

## 2019-07-03 PROCEDURE — 77336 RADIATION PHYSICS CONSULT: CPT | Performed by: RADIOLOGY

## 2019-07-03 PROCEDURE — 77385: CPT | Performed by: RADIOLOGY

## 2019-07-24 NOTE — PROGRESS NOTES
RADIATION ONCOLOGY COMPLETION NOTE    PATIENT:   Ita Pickett  MEDICAL RECORD:  [id number]  :    1950  COMPLETION DATE:  7/3/2019  DIAGNOSIS:   Right breast cancer  Stage 0 (cTis (DCIS), cN0, cM0, ER: Positive, IN: Positive, HER2: Unknown)  Stage 0 (pTis (DCIS), pN0, cM0, ER: Positive, IN: Positive, HER2: Unknown)      BRIEF HISTORY:  This 68 y.o. patient completed radiotherapy.  She has ductal carcinoma in situ of the upper outer quadrant of the right breast, stage 0 (Tis, N0, M0).  This is her fourth primary neoplasm, in remission from ovarian cancer, Hodgkin's disease and resected right lung cancer.  She has history of receivig mantle field radiation and pelvic irradiation with long-term sequela from those treatments.  She does have hormone receptor positive disease, and was given a trial of tamoxifen, but discontinued due to side effects.  Adjuvant radiotherapy to the lumpectomy bed was recommended for local tumor control.    TREATMENT COURSE:  The upper outer quadrant lumpectomy bed received a dose of 38.5 Gray in 10 daily fractions, twice daily of 3.85 Johns each using 6 MV photons with accelerated partial breast radiation/3D isodose technique.    DATES OF TREATMENT: 2019 through 7/3/2019    TOLERANCE:   typical for treatment site     STATUS:  no evidence of disease recurrence    DISPOSITION:  Follow up in Radiation Oncology in approximately 1 month.        Nacho Hanna MD

## 2019-08-06 ENCOUNTER — HOSPITAL ENCOUNTER (OUTPATIENT)
Dept: RADIATION ONCOLOGY | Facility: HOSPITAL | Age: 69
Setting detail: RADIATION/ONCOLOGY SERIES
Discharge: HOME | End: 2019-08-06

## 2019-08-06 ENCOUNTER — OFFICE VISIT (OUTPATIENT)
Dept: RADIATION ONCOLOGY | Facility: HOSPITAL | Age: 69
End: 2019-08-06

## 2019-08-06 VITALS
TEMPERATURE: 100.5 F | SYSTOLIC BLOOD PRESSURE: 162 MMHG | OXYGEN SATURATION: 93 % | BODY MASS INDEX: 20.45 KG/M2 | WEIGHT: 121 LBS | HEART RATE: 100 BPM | RESPIRATION RATE: 20 BRPM | DIASTOLIC BLOOD PRESSURE: 81 MMHG

## 2019-08-06 DIAGNOSIS — C50.411 MALIGNANT NEOPLASM OF UPPER-OUTER QUADRANT OF RIGHT BREAST IN FEMALE, ESTROGEN RECEPTOR POSITIVE (HCC): Primary | ICD-10-CM

## 2019-08-06 DIAGNOSIS — Z17.0 MALIGNANT NEOPLASM OF UPPER-OUTER QUADRANT OF RIGHT BREAST IN FEMALE, ESTROGEN RECEPTOR POSITIVE (HCC): Primary | ICD-10-CM

## 2019-08-06 PROCEDURE — G0463 HOSPITAL OUTPT CLINIC VISIT: HCPCS

## 2019-08-06 RX ORDER — METHYLPREDNISOLONE 4 MG/1
TABLET ORAL
Qty: 1 EACH | Refills: 0 | Status: SHIPPED | OUTPATIENT
Start: 2019-08-06 | End: 2020-03-18

## 2019-08-06 RX ORDER — AMOXICILLIN AND CLAVULANATE POTASSIUM 875; 125 MG/1; MG/1
1 TABLET, FILM COATED ORAL 2 TIMES DAILY
Qty: 20 TABLET | Refills: 0 | Status: SHIPPED | OUTPATIENT
Start: 2019-08-06 | End: 2020-03-18

## 2019-08-06 NOTE — PROGRESS NOTES
FOLLOW UP NOTE    PATIENT:                                                      Ita Pickett  MEDICAL RECORD #:                        [id number]  :                                                          1950  COMPLETION DATE:   7/3/2019  DIAGNOSIS:     Breast cancer (CMS/[facility])  Staging form: Breast, AJCC 8th Edition  - Clinical stage from 2018: Stage 0 (cTis (DCIS), cN0, cM0, ER: Positive, VT: Positive, HER2: Unknown) - Signed by Nacho Hanna MD on 2019  - Pathologic stage from 2019: Stage 0 (pTis (DCIS), pN0, cM0, ER: Positive, VT: Positive, HER2: Unknown) - Signed by Nacho Hanna MD on 2019      BRIEF HISTORY:    Initial follow-up visit after adjuvant partial breast radiation.  She is status post conservation surgery.  She has ductal carcinoma in situ of the upper outer quadrant of the right breast, stage 0 (Tis, N0, M0).  This is her fourth primary neoplasm, in remission from ovarian cancer, Hodgkin's disease and resected right lung cancer.  She tolerated radiotherapy well with no significant dermatitis, breast discomfort, treatment related fatigue or lymphedema.  She previously initiated hormonal therapy with tamoxifen but did not tolerated due to nausea and vomiting.  She has not yet had follow-up with her surgeon or had any breast imaging since diagnosis.    Over the past 2 days she has developed cough with productive yellow sputum, sore throat, and muscle aches.  She has had a few episodes of diarrhea controlled with Imodium.  She continues to use her regular inhaler and is not on supplemental oxygen.    MEDICATIONS: Medication reconciliation for the patient was reviewed and confirmed in the electronic medical record.    Review of Systems   Constitutional: Positive for fatigue and fever.   HENT:   Positive for sore throat.    Eyes: Negative.    Respiratory: Positive for cough, shortness of breath and wheezing.    Cardiovascular: Negative.    Gastrointestinal:  Positive for diarrhea.   Endocrine: Negative.    Genitourinary: Negative.     Musculoskeletal: Positive for arthralgias.   Skin: Negative.    Neurological: Positive for dizziness and light-headedness.   Hematological: Negative.    Psychiatric/Behavioral: Negative.        KPS 80%    Physical Exam   Constitutional: She is oriented to person, place, and time. She appears well-developed and well-nourished.   HENT:   Head: Normocephalic and atraumatic.   Neck: Normal range of motion. Neck supple.   Cardiovascular: Normal rate, regular rhythm and normal heart sounds.   No murmur heard.  Pulmonary/Chest: Effort normal. She has no wheezes. She has no rales. She exhibits no tenderness. Right breast exhibits skin change ( Healed lumpectomy incision.  No suspicious change.). Right breast exhibits no inverted nipple, no mass, no nipple discharge and no tenderness.   Abdominal: Soft. Bowel sounds are normal. She exhibits no distension. There is no hepatosplenomegaly. There is no tenderness.   Musculoskeletal: Normal range of motion. She exhibits no edema or tenderness.   Lymphadenopathy:     She has no cervical adenopathy.     She has no axillary adenopathy.        Right: No supraclavicular adenopathy present.        Left: No supraclavicular adenopathy present.   Neurological: She is alert and oriented to person, place, and time. She has normal strength. No sensory deficit.   Skin: Skin is warm and dry.   Psychiatric: She has a normal mood and affect. Her behavior is normal. Judgment and thought content normal.   Nursing note and vitals reviewed.      VITAL SIGNS:   Vitals:    08/06/19 1311   BP: 162/81  Comment: sitting, 164/76 standing   Pulse: 100   Resp: 20   Temp: 100.5 °F (38.1 °C)   TempSrc: Temporal   SpO2: 93%   Weight: 54.9 kg (121 lb)   PainSc:   8   PainLoc: Neck       The following portions of the patient's history were reviewed and updated as appropriate: allergies, current medications, past family history, past  medical history, past social history, past surgical history and problem list.         Ita was seen today for breast cancer.    Diagnoses and all orders for this visit:    Malignant neoplasm of upper-outer quadrant of right breast in female, estrogen receptor positive (CMS/HCC)    Other orders  -     methylPREDNISolone (MEDROL, HANSA,) 4 MG tablet; Take as directed on package instructions.  -     amoxicillin-clavulanate (AUGMENTIN) 875-125 MG per tablet; Take 1 tablet by mouth 2 (Two) Times a Day.         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  Status post conservation treatment and adjuvant partial breast radiation.  She tolerated treatment well and has no evidence of local recurrence in the breast.    Recent exacerbation of COPD and upper respiratory infection.    RECOMMENDATIONS: She will follow-up with her surgeon, Dr. Jay within the next month.  She requests establishing care with a hematologist/oncologist, which would be very reasonable given her history of multiple neoplasms and intolerance of hormonal therapy.  She will discuss this with Dr. Jay.  She should be due for baseline posttreatment mammography around December 2019.  She does not necessarily need routine follow-up with me; however, I gave her a return visit for January 2020 as a back-up in case she has not established regular follow-up and imaging by then.    I did prescribe a course of antibiotics and steroids for her current respiratory symptoms.  If symptoms do not quickly improve, she will follow-up with her pulmonologist and primary physician.    Return in about 5 months (around 1/6/2020) for Office Visit.    Nacho Hanna MD    Errors in dictation may reflect use of voice recognition software and not all errors in transcription may have been detected prior to signing.

## 2020-02-06 ENCOUNTER — HOSPITAL ENCOUNTER (OUTPATIENT)
Dept: CT IMAGING | Facility: HOSPITAL | Age: 70
Discharge: HOME | End: 2020-02-06
Admitting: INTERNAL MEDICINE

## 2020-02-06 DIAGNOSIS — R91.8 LUNG NODULES: ICD-10-CM

## 2020-02-06 PROCEDURE — 71250 CT THORAX DX C-: CPT

## 2020-02-18 ENCOUNTER — OFFICE VISIT (OUTPATIENT)
Dept: GASTROENTEROLOGY | Facility: CLINIC | Age: 70
End: 2020-02-18

## 2020-02-18 VITALS
DIASTOLIC BLOOD PRESSURE: 67 MMHG | HEART RATE: 97 BPM | SYSTOLIC BLOOD PRESSURE: 122 MMHG | WEIGHT: 129.6 LBS | BODY MASS INDEX: 21.59 KG/M2 | HEIGHT: 65 IN

## 2020-02-18 DIAGNOSIS — R13.19 ESOPHAGEAL DYSPHAGIA: ICD-10-CM

## 2020-02-18 DIAGNOSIS — K22.0 ACHALASIA: ICD-10-CM

## 2020-02-18 DIAGNOSIS — Z92.3 HISTORY OF RADIATION THERAPY: ICD-10-CM

## 2020-02-18 DIAGNOSIS — Z87.19 HISTORY OF ESOPHAGEAL STRICTURE: Primary | ICD-10-CM

## 2020-02-18 DIAGNOSIS — C81.90 HODGKIN LYMPHOMA, UNSPECIFIED HODGKIN LYMPHOMA TYPE, UNSPECIFIED BODY REGION (HCC): ICD-10-CM

## 2020-02-18 DIAGNOSIS — T85.848A PAIN AROUND PERCUTANEOUS ENDOSCOPIC GASTROSTOMY (PEG) TUBE SITE, INITIAL ENCOUNTER: ICD-10-CM

## 2020-02-18 PROCEDURE — 99205 OFFICE O/P NEW HI 60 MIN: CPT | Performed by: INTERNAL MEDICINE

## 2020-02-18 NOTE — PROGRESS NOTES
GASTROENTEROLOGY OFFICE NOTE  Ita Pickett  [id number]  1950       CARE TEAM  Patient Care Team:  Lucho Peters MD as PCP - General (Family Medicine)  Anatoly Jay MD as PCP - Claims Attributed  Lucho Peters MD as Consulting Physician (Family Medicine)  Anatoly Jay MD as Referring Physician (General Surgery)  Nacho Hanna MD as Consulting Physician (Radiation Oncology)  Erik Pelletier MD as Consulting Physician (Gastroenterology)  Gage Collier MD as Consulting Physician (Pulmonary Disease)    No ref. provider found     Chief Complaint   Patient presents with   • GI Problem     Esophageal stricture. Last EGD 10/2018 at [facility]         HISTORY OF PRESENT ILLNESS:  First visit for this 69-year-old white female with multiple significant medical problems with primary complaint of esophageal stricture, dysphagia to solids, achalasia and request for PEG tube removal.    Patient has a complicated history and has been seen in tertiary referral centers specifically [facility].  She has a history of hypertension, Hodgkin's lymphoma status post x-ray therapy to chest, neck, splenectomy in the 1980s.  Ovarian cancer resulting in total abdominal hysterectomy and radiation therapy 1975 squamous cell lung cancer resulting in right lower lobe resection 2000.  COPD, benign esophageal strictures which is responded to esophageal dilations longstanding problems with dysphagia to solids.  She has a clinical history of achalasia but the [facility] records indicate that she has type II achalasia.    She is here today requesting that her low-profile PEG tube be removed.  She states she has not used it for very long time now.  Many months if not more.  She understands that if it is removed and she has nutritional challenges again that it may need to be replaced.  She would rather have it removed.    She is requesting esophageal dilation of her esophageal  stricture.    She has not had her colon cancer screening in over a decade.    Outside of her dysphagia to solids she denies odynophagia, early satiety, unexplained weight loss, melanotic stools, bright red blood per rectum or changes in her usual bowel habits.    PAST MEDICAL HISTORY  Past Medical History:   Diagnosis Date   • Breast cancer (CMS/HCC)    • Cancer (CMS/HCC)     lung   • COPD (chronic obstructive pulmonary disease) (CMS/HCC)    • Disease of thyroid gland    • Dysrhythmia     tachycardia   • Frequent PVCs     bigeminal pvc   • Hodgkin's disease (CMS/HCC)    • Hypertension    • Lung cancer (CMS/HCC)    • Ovarian cancer (CMS/HCC)         PAST SURGICAL HISTORY  Past Surgical History:   Procedure Laterality Date   • BREAST LUMPECTOMY     • CATARACT EXTRACTION     • CATARACT EXTRACTION W/ INTRAOCULAR LENS IMPLANTW/ TRABECULECTOMY     • COLONOSCOPY      limited due to scar tissue more thatn 10 years ago   • ENDOSCOPY     • GASTROSTOMY FEEDING TUBE INSERTION  2018    [facility]   • HIP ARTHROSCOPY Right 2011    total hip replacement    • HYSTERECTOMY     • LUNG SURGERY  2000   • RETINAL DETACHMENT REPAIR  2015   • SPLENECTOMY  1980   • THYROIDECTOMY  2012    partial   • UPPER GASTROINTESTINAL ENDOSCOPY  2018 x 3    esophageal strictures by [facility]        MEDICATIONS:    Current Outpatient Medications:   •  Calcium Carbonate-Vit D-Min (CALCIUM 1200 PO), Take  by mouth., Disp: , Rfl:   •  Cholecalciferol (VITAMIN D3) 5000 units tablet tablet, Take 5,000 Units by mouth Daily., Disp: , Rfl:   •  diazePAM (VALIUM) 5 MG tablet, if needed., Disp: , Rfl:   •  Fluticasone-Umeclidin-Vilant (TRELEGY ELLIPTA IN), Inhale., Disp: , Rfl:   •  methylPREDNISolone (MEDROL, HANSA,) 4 MG tablet, Take as directed on package instructions., Disp: 1 each, Rfl: 0  •  metoprolol tartrate (LOPRESSOR) 50 MG tablet, Take 50 mg by mouth Daily., Disp: , Rfl:   •  montelukast (SINGULAIR) 10 MG tablet, Take 1 tablet by mouth  Every Night., Disp: 30 tablet, Rfl: 11  •  omeprazole (priLOSEC) 20 MG capsule, Take 20 mg by mouth Daily., Disp: , Rfl:   •  traZODone (DESYREL) 100 MG tablet, Take 100 mg by mouth As Needed., Disp: , Rfl:   •  vitamin C (ASCORBIC ACID) 500 MG tablet, Take 500 mg by mouth 2 (Two) Times a Day., Disp: , Rfl:   •  amoxicillin-clavulanate (AUGMENTIN) 875-125 MG per tablet, Take 1 tablet by mouth 2 (Two) Times a Day., Disp: 20 tablet, Rfl: 0  •  ibuprofen (ADVIL,MOTRIN) 100 MG/5ML suspension, Take  by mouth Every 8 (Eight) Hours As Needed for Mild Pain ., Disp: , Rfl:   •  tiotropium bromide-olodaterol (STIOLTO RESPIMAT) 2.5-2.5 MCG/ACT aerosol solution inhaler, Inhale 2 puffs Daily., Disp: 1 inhaler, Rfl: 11    ALLERGIES  No Known Allergies    FAMILY HISTORY:  Family History   Problem Relation Age of Onset   • Cancer Mother    • Lung cancer Mother    • Heart disease Father    • Heart disease Brother    • Breast cancer Maternal Grandmother    • Colon cancer Neg Hx    • Colon polyps Neg Hx        SOCIAL HISTORY  Social History     Socioeconomic History   • Marital status:      Spouse name: Not on file   • Number of children: Not on file   • Years of education: Not on file   • Highest education level: Not on file   Tobacco Use   • Smoking status: Former Smoker     Packs/day: 3.00     Types: Cigarettes     Last attempt to quit: 5/10/2010     Years since quittin.7   • Smokeless tobacco: Never Used   Substance and Sexual Activity   • Alcohol use: Yes     Alcohol/week: 2.0 standard drinks     Types: 2 Shots of liquor per week     Comment: 2 drinks per day   • Drug use: Yes     Types: Marijuana     Comment: brownie   • Sexual activity: Defer     Socioeconomic History:  She is a  and does not have any children she is a non-smoker for many years now and drinks alcoholic beverages rarely in social moderation.  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       REVIEW OF SYSTEMS  Review  of Systems   Constitutional: Positive for appetite change, fatigue and unexpected weight gain. Negative for activity change, chills, diaphoresis, fever and unexpected weight loss.   HENT: Positive for trouble swallowing and voice change. Negative for congestion, dental problem, drooling, ear discharge, ear pain, facial swelling, hearing loss, mouth sores, nosebleeds, postnasal drip, rhinorrhea, sinus pressure, sneezing, sore throat, swollen glands and tinnitus.    Respiratory: Positive for shortness of breath. Negative for apnea, cough, choking, chest tightness, wheezing and stridor.    Cardiovascular: Positive for leg swelling. Negative for chest pain and palpitations.   Gastrointestinal: Positive for GERD. Negative for abdominal distention, abdominal pain, anal bleeding, blood in stool, constipation, diarrhea, nausea, rectal pain, vomiting and indigestion.   Endocrine: Positive for cold intolerance. Negative for heat intolerance, polydipsia, polyphagia and polyuria.   Musculoskeletal: Positive for back pain, gait problem, neck pain and neck stiffness. Negative for arthralgias, joint swelling, myalgias and bursitis.   Allergic/Immunologic: Negative for environmental allergies, food allergies and immunocompromised state.   Neurological: Positive for light-headedness. Negative for dizziness, tremors, seizures, syncope, facial asymmetry, speech difficulty, weakness, numbness, headache, memory problem and confusion.   Hematological: Negative for adenopathy. Does not bruise/bleed easily.   Psychiatric/Behavioral: Positive for sleep disturbance. Negative for agitation, behavioral problems, decreased concentration, dysphoric mood, hallucinations, self-injury, suicidal ideas, negative for hyperactivity, depressed mood and stress. The patient is nervous/anxious.      I have reviewed the above-noted review of systems and the positive active issues are those noted in the HPI above    PHYSICAL EXAM   /67 (BP Location:  "Right arm, Patient Position: Sitting, Cuff Size: Adult)   Pulse 97   Ht 163.8 cm (64.5\")   Wt 58.8 kg (129 lb 9.6 oz)   BMI 21.90 kg/m²   General: Alert and oriented x 3. In no apparent or acute distress.  and No stigmata of chronic liver disease  HEENT: Anicteric slcera. Normal oropharynx  Neck: Supple. Without lymphadenopathy  CV: Regular rate and rhythm, S1, S2  Lungs: Clear to ausculation. Without rales, robchi and wheezing  Abdomen:  Soft,non-distended without palpable masses or hepatosplenomeagaly, areas of rebound tenderness or guarding. A low-profile PEG tube is noted in the left upper quadrant  Extremeties: without clubbing, cyanosis or edema  Neurologic:  Alert and oriented x 3 without focal motor or sensory deficits  Rectal exam: deferred         Results Review:  I reviewed the patient's new clinical results.      ASSESSMENT  1.-  Type II achalasia being managed conservatively.  Review of her old records/outside records completed.  Clinical clinic records do not provide clear establishment of this diagnosis.  It does appear that because of her previous radiation therapy she was felt to be a poor candidate for Heller myotomy or POEM type intervention.  2.-  History of esophageal stricture.  Recurrent dysphagia to solids the same that has responded in the past esophageal dilation.  Esophageal dilation was offered.  Review of old records from [facility] indicates esophageal dilation in 2018.  She was dilated to 18 mm at that time  3.-  Currently indwelling PEG tube.  Patient does not believe it remains necessary wishes it removed.  She understands that it may need to be replaced in the future and is willing to accept this  4.-  Average risk for colon cancer.  Patient is due for colon cancer screening.  5.-  History of lymphoma status post XRT and splenectomy  6.-  History ovarian cancer status post hysterectomy and radiation therapy  7.-  History of squamous cell lung cancer status post right lower " lobe resection.    PLAN  1.-  EGD for esophageal dilation  2.-  PEG tube removal at time of EGD  3.-  Screening colonoscopy      I discussed the patients findings and my recommendations with patient    Felice Mo Mcintosh MD  2/18/2020       Much of this note is an electronic transcription of spoken language to printed text. Electronic transcription of spoken language may permit erroneous, nonsensical word phrases to be inadvertently transcribed.  Although I have reviewed the note for these errors, some may still be present.

## 2020-02-23 PROBLEM — R13.19 ESOPHAGEAL DYSPHAGIA: Status: ACTIVE | Noted: 2020-02-23

## 2020-02-23 PROBLEM — Z87.19 HISTORY OF ESOPHAGEAL STRICTURE: Status: ACTIVE | Noted: 2020-02-23

## 2020-02-23 PROBLEM — C81.90 HODGKIN LYMPHOMA: Status: ACTIVE | Noted: 2020-02-23

## 2020-02-23 PROBLEM — T85.848A PAIN AROUND PEG TUBE SITE: Status: ACTIVE | Noted: 2020-02-23

## 2020-02-23 PROBLEM — Z92.3 HISTORY OF RADIATION THERAPY: Status: ACTIVE | Noted: 2020-02-23

## 2020-03-04 ENCOUNTER — OFFICE VISIT (OUTPATIENT)
Dept: PULMONOLOGY | Facility: CLINIC | Age: 70
End: 2020-03-04

## 2020-03-04 VITALS
TEMPERATURE: 98.3 F | OXYGEN SATURATION: 93 % | SYSTOLIC BLOOD PRESSURE: 124 MMHG | BODY MASS INDEX: 21.54 KG/M2 | WEIGHT: 129.25 LBS | DIASTOLIC BLOOD PRESSURE: 84 MMHG | HEIGHT: 65 IN

## 2020-03-04 DIAGNOSIS — J44.9 CHRONIC OBSTRUCTIVE PULMONARY DISEASE, UNSPECIFIED COPD TYPE (HCC): Primary | ICD-10-CM

## 2020-03-04 PROCEDURE — 94729 DIFFUSING CAPACITY: CPT | Performed by: INTERNAL MEDICINE

## 2020-03-04 PROCEDURE — 99214 OFFICE O/P EST MOD 30 MIN: CPT | Performed by: INTERNAL MEDICINE

## 2020-03-04 PROCEDURE — 94726 PLETHYSMOGRAPHY LUNG VOLUMES: CPT | Performed by: INTERNAL MEDICINE

## 2020-03-04 PROCEDURE — 94375 RESPIRATORY FLOW VOLUME LOOP: CPT | Performed by: INTERNAL MEDICINE

## 2020-03-04 RX ORDER — FLUTICASONE PROPIONATE 50 MCG
2 SPRAY, SUSPENSION (ML) NASAL 2 TIMES DAILY
Qty: 1 BOTTLE | Refills: 11 | Status: SHIPPED | OUTPATIENT
Start: 2020-03-04 | End: 2020-10-15 | Stop reason: SDUPTHER

## 2020-03-04 RX ORDER — LEVOCETIRIZINE DIHYDROCHLORIDE 5 MG/1
5 TABLET, FILM COATED ORAL EVERY EVENING
Qty: 30 TABLET | Refills: 11 | Status: SHIPPED | OUTPATIENT
Start: 2020-03-04 | End: 2020-10-15 | Stop reason: SDUPTHER

## 2020-03-04 RX ORDER — MONTELUKAST SODIUM 10 MG/1
10 TABLET ORAL NIGHTLY
Qty: 30 TABLET | Refills: 11 | Status: SHIPPED | OUTPATIENT
Start: 2020-03-04 | End: 2020-03-05

## 2020-03-04 NOTE — PROGRESS NOTES
"CC:    Lung nodules / COPD    HPI:    68 y/o WF w/ complex PMH:  120 py smoking quit 2010, COPD, NSCLC s/p resection RLL 2000 (presumeably early stage - no chemo/RT), Hodgkins Lymphoma 1980 s/p chest RT and spleenectomy, Ovarian Ca 1975 s/p RT, hypothyroidism, achalasia referred by Dr. Pelletier for lung nodules.  She recently had an esophageal dilation followed by CT chest in Feb that showed achalasia.  In addition the CT scan showed several sub-centimeter scattered nodules.  Patient quit smoking as mentioned above in 2010.  She does report significant ZHONG with minimal activity (showering, dressing, ADL's, walking up steps).  Does ok walking on a flat surface.  No other complaints.    Repeat CT showed a chest wall based cavitary RML lesion.  Radiology recommended CT guided biopsy.  She went for this and the lesion decreased in size so the biopsy was cancelled.   She had a PEG placed for her severe esophageal stenosis and continues to get repeat dilations.    INTERVAL HISTORY:    Patient returns for follow up.  Not able to afford the inhalers.  Says she has had \"pneumonia\" for 2-3 months.  Has allergies and post nasal gtt.   Intermittently productive cough as well as wheezing.    PMH:    Past Medical History:   Diagnosis Date   • Breast cancer (CMS/HCC)    • Cancer (CMS/HCC)     lung   • COPD (chronic obstructive pulmonary disease) (CMS/HCC)    • Disease of thyroid gland    • Dysrhythmia     tachycardia   • Frequent PVCs     bigeminal pvc   • Hodgkin's disease (CMS/HCC)    • Hypertension    • Lung cancer (CMS/HCC)    • Ovarian cancer (CMS/HCC)      PSH:    Past Surgical History:   Procedure Laterality Date   • BREAST LUMPECTOMY     • CATARACT EXTRACTION     • CATARACT EXTRACTION W/ INTRAOCULAR LENS IMPLANTW/ TRABECULECTOMY     • COLONOSCOPY      limited due to scar tissue more thatn 10 years ago   • ENDOSCOPY     • GASTROSTOMY FEEDING TUBE INSERTION  2018    [facility]   • HIP ARTHROSCOPY Right 2011    total hip " replacement    • HYSTERECTOMY     • LUNG SURGERY     • RETINAL DETACHMENT REPAIR  2015   • SPLENECTOMY  1980   • THYROIDECTOMY  2012    partial   • UPPER GASTROINTESTINAL ENDOSCOPY  2018 x 3    esophageal strictures by [facility]     FH:    Family History   Problem Relation Age of Onset   • Cancer Mother    • Lung cancer Mother    • Heart disease Father    • Heart disease Brother    • Breast cancer Maternal Grandmother    • Colon cancer Neg Hx    • Colon polyps Neg Hx      SH:    Social History     Socioeconomic History   • Marital status:      Spouse name: Not on file   • Number of children: Not on file   • Years of education: Not on file   • Highest education level: Not on file   Tobacco Use   • Smoking status: Former Smoker     Packs/day: 3.00     Years: 40.00     Pack years: 120.00     Types: Cigarettes     Last attempt to quit: 5/10/2010     Years since quittin.8   • Smokeless tobacco: Never Used   Substance and Sexual Activity   • Alcohol use: Yes     Alcohol/week: 2.0 standard drinks     Types: 2 Shots of liquor per week     Comment: 2 drinks per day   • Drug use: Yes     Types: Marijuana     Comment: brownie   • Sexual activity: Defer     ALLERGIES:    No Known Allergies  MEDICATIONS:      Current Outpatient Medications:   •  amoxicillin-clavulanate (AUGMENTIN) 875-125 MG per tablet, Take 1 tablet by mouth 2 (Two) Times a Day., Disp: 20 tablet, Rfl: 0  •  Calcium Carbonate-Vit D-Min (CALCIUM 1200 PO), Take  by mouth., Disp: , Rfl:   •  Cholecalciferol (VITAMIN D3) 5000 units tablet tablet, Take 5,000 Units by mouth Daily., Disp: , Rfl:   •  diazePAM (VALIUM) 5 MG tablet, if needed., Disp: , Rfl:   •  Fluticasone-Umeclidin-Vilant (TRELEGY ELLIPTA IN), Inhale Daily., Disp: , Rfl:   •  ibuprofen (ADVIL,MOTRIN) 100 MG/5ML suspension, Take  by mouth Every 8 (Eight) Hours As Needed for Mild Pain ., Disp: , Rfl:   •  methylPREDNISolone (MEDROL, HANSA,) 4 MG tablet, Take as directed on package  instructions., Disp: 1 each, Rfl: 0  •  metoprolol tartrate (LOPRESSOR) 50 MG tablet, Take 50 mg by mouth Daily., Disp: , Rfl:   •  montelukast (SINGULAIR) 10 MG tablet, Take 1 tablet by mouth Every Night., Disp: 30 tablet, Rfl: 11  •  omeprazole (priLOSEC) 20 MG capsule, Take 20 mg by mouth Daily., Disp: , Rfl:   •  tiotropium bromide-olodaterol (STIOLTO RESPIMAT) 2.5-2.5 MCG/ACT aerosol solution inhaler, Inhale 2 puffs Daily., Disp: 1 inhaler, Rfl: 11  •  traZODone (DESYREL) 100 MG tablet, Take 100 mg by mouth As Needed., Disp: , Rfl:   •  vitamin C (ASCORBIC ACID) 500 MG tablet, Take 500 mg by mouth 2 (Two) Times a Day., Disp: , Rfl:   ROS:  Per HPI, otherwise all systems reviewed and negative.    DIAGNOSTIC DATA (Reviewed and interpreted by me unless otherwise specified):    PFT 5/10/18 - severe obstruction, no change w/ BD, air trapping, normal DLCO    PFT 3/4/20 - very severe obstruction, + air trapping, normal DLCO    CT Chest 2/21/18 - prior RLL lobectomy, radiation changes near mediastinum bilateral upper lobes, emphysema, multiple sub-centimeter nodules, achalasia w/ fluid filled esophagus, prior spleenectomy    CT Chest 2/6/19 - stable from prior, sub-centimeter nodules stable    CT Chest 2/6/20 - stable nodules, other findings stable        Vitals:    03/04/20 1205   BP: 124/84   Temp: 98.3 °F (36.8 °C)   SpO2: 93%       Physical Exam   Constitutional: Oriented to person, place, and time. Appears well-developed and well-nourished.   Head: Normocephalic and atraumatic.   Nose: Nose normal.   Mouth/Throat: normal.  Eyes: Conjunctivae are normal.  Pupils normal.  Neck: No tracheal deviation present.   Cardiovascular: Normal rate, regular rhythm, normal heart sounds and intact distal pulses.  Exam reveals no gallop and no friction rub.  No thrill.  No JVD.  No edema.  No murmur heard.  Pulmonary/Chest: Effort normal and breath sounds clear.  No tenderness to palpation.  No clubbing.   Abdominal: Soft. Bowel  sounds are normal. No distension. No tenderness. There is no guarding.   Musculoskeletal: Normal range of motion.  No tenderness.  Lymphadenopathy:  No cervical adenopathy.   Neurological:  No new focal neurological deficits observed   Skin: Skin is warm and dry. No rash noted.   Psychiatric: Normal mood and affect.  Behavior is normal. Judgment normal.    Assessment/Plan     1)  GOLD 4D COPD w/ Asthma Overlap - Get nebulizer, pulmicort neb 0.5 mg bid, and brovana or perforomist neb bid through DME company. High risk for pneumonia w/ achalasia and fluid filled esophagus.    2)  Lung Nodules - sub-centimeter, stable x 2 years.  Given smoking history she still qualifies for lung cancer screening.  Due for next scan 2/2021.    3)  Allergic Rhinitis - flonase, anti-histamine, singulair    RTC 4-6 weeks with deepali, if not improved send cbc w/ diff, ige, allergy panel    Gage Collier MD  Pulmonology and Critical Care Medicine  03/04/20 12:35 PM  Electronically Signed    C.C.:  Erik Pelletier MD, Lucho Peters MD

## 2020-03-05 RX ORDER — MONTELUKAST SODIUM 10 MG/1
TABLET ORAL
Qty: 90 TABLET | Refills: 10 | Status: SHIPPED | OUTPATIENT
Start: 2020-03-05 | End: 2020-06-10 | Stop reason: SDUPTHER

## 2020-03-11 ENCOUNTER — OUTSIDE FACILITY SERVICE (OUTPATIENT)
Dept: GASTROENTEROLOGY | Facility: CLINIC | Age: 70
End: 2020-03-11

## 2020-03-11 ENCOUNTER — LAB REQUISITION (OUTPATIENT)
Dept: LAB | Facility: HOSPITAL | Age: 70
End: 2020-03-11

## 2020-03-11 DIAGNOSIS — R13.10 DYSPHAGIA, UNSPECIFIED: ICD-10-CM

## 2020-03-11 PROCEDURE — 45381 COLONOSCOPY SUBMUCOUS NJX: CPT | Performed by: INTERNAL MEDICINE

## 2020-03-11 PROCEDURE — 45385 COLONOSCOPY W/LESION REMOVAL: CPT | Performed by: INTERNAL MEDICINE

## 2020-03-11 PROCEDURE — 43249 ESOPH EGD DILATION <30 MM: CPT | Performed by: INTERNAL MEDICINE

## 2020-03-11 PROCEDURE — 88305 TISSUE EXAM BY PATHOLOGIST: CPT | Performed by: INTERNAL MEDICINE

## 2020-03-13 LAB
CYTO UR: NORMAL
LAB AP CASE REPORT: NORMAL
LAB AP CLINICAL INFORMATION: NORMAL
PATH REPORT.FINAL DX SPEC: NORMAL
PATH REPORT.GROSS SPEC: NORMAL

## 2020-03-18 ENCOUNTER — APPOINTMENT (OUTPATIENT)
Dept: GENERAL RADIOLOGY | Facility: HOSPITAL | Age: 70
End: 2020-03-18

## 2020-03-18 ENCOUNTER — HOSPITAL ENCOUNTER (INPATIENT)
Facility: HOSPITAL | Age: 70
LOS: 3 days | Discharge: HOME | End: 2020-03-21
Attending: EMERGENCY MEDICINE | Admitting: INTERNAL MEDICINE

## 2020-03-18 ENCOUNTER — APPOINTMENT (OUTPATIENT)
Dept: CT IMAGING | Facility: HOSPITAL | Age: 70
End: 2020-03-18

## 2020-03-18 DIAGNOSIS — I31.39 PERICARDIAL EFFUSION: ICD-10-CM

## 2020-03-18 DIAGNOSIS — J44.9 CHRONIC OBSTRUCTIVE PULMONARY DISEASE, UNSPECIFIED COPD TYPE (HCC): ICD-10-CM

## 2020-03-18 DIAGNOSIS — R13.12 OROPHARYNGEAL DYSPHAGIA: ICD-10-CM

## 2020-03-18 DIAGNOSIS — R06.00 DYSPNEA, UNSPECIFIED TYPE: Primary | ICD-10-CM

## 2020-03-18 DIAGNOSIS — T85.848A PAIN AROUND PERCUTANEOUS ENDOSCOPIC GASTROSTOMY (PEG) TUBE SITE, INITIAL ENCOUNTER: ICD-10-CM

## 2020-03-18 DIAGNOSIS — D72.829 LEUKOCYTOSIS, UNSPECIFIED TYPE: ICD-10-CM

## 2020-03-18 DIAGNOSIS — T85.848S PAIN AROUND PERCUTANEOUS ENDOSCOPIC GASTROSTOMY (PEG) TUBE SITE, SEQUELA: ICD-10-CM

## 2020-03-18 DIAGNOSIS — I47.1 SVT (SUPRAVENTRICULAR TACHYCARDIA) (HCC): ICD-10-CM

## 2020-03-18 DIAGNOSIS — R91.8 LUNG INFILTRATE: ICD-10-CM

## 2020-03-18 PROBLEM — E87.20 LACTIC ACIDOSIS: Status: ACTIVE | Noted: 2020-03-18

## 2020-03-18 PROBLEM — A41.9 SEPSIS, UNSPECIFIED ORGANISM (HCC): Status: ACTIVE | Noted: 2020-03-18

## 2020-03-18 PROBLEM — I47.10 SVT (SUPRAVENTRICULAR TACHYCARDIA): Status: ACTIVE | Noted: 2020-03-18

## 2020-03-18 LAB
ALBUMIN SERPL-MCNC: 3.1 G/DL (ref 3.5–5.2)
ALBUMIN/GLOB SERPL: 0.7 G/DL
ALP SERPL-CCNC: 125 U/L (ref 39–117)
ALT SERPL W P-5'-P-CCNC: 9 U/L (ref 1–33)
ANION GAP SERPL CALCULATED.3IONS-SCNC: 16 MMOL/L (ref 5–15)
ARTERIAL PATENCY WRIST A: ABNORMAL
AST SERPL-CCNC: 22 U/L (ref 1–32)
ATMOSPHERIC PRESS: ABNORMAL MM[HG]
BASE EXCESS BLDA CALC-SCNC: 3.2 MMOL/L (ref 0–2)
BASOPHILS # BLD AUTO: 0.07 10*3/MM3 (ref 0–0.2)
BASOPHILS NFR BLD AUTO: 0.4 % (ref 0–1.5)
BDY SITE: ABNORMAL
BILIRUB SERPL-MCNC: 0.7 MG/DL (ref 0.2–1.2)
BODY TEMPERATURE: 37 C
BUN BLD-MCNC: 21 MG/DL (ref 8–23)
BUN/CREAT SERPL: 24.1 (ref 7–25)
CALCIUM SPEC-SCNC: 9.3 MG/DL (ref 8.6–10.5)
CHLORIDE SERPL-SCNC: 91 MMOL/L (ref 98–107)
CO2 BLDA-SCNC: 28.2 MMOL/L (ref 22–33)
CO2 SERPL-SCNC: 30 MMOL/L (ref 22–29)
COHGB MFR BLD: 0.7 % (ref 0–2)
CREAT BLD-MCNC: 0.87 MG/DL (ref 0.57–1)
D-LACTATE SERPL-SCNC: 4.6 MMOL/L (ref 0.5–2)
DEPRECATED RDW RBC AUTO: 56.5 FL (ref 37–54)
EOSINOPHIL # BLD AUTO: 0.01 10*3/MM3 (ref 0–0.4)
EOSINOPHIL NFR BLD AUTO: 0.1 % (ref 0.3–6.2)
ERYTHROCYTE [DISTWIDTH] IN BLOOD BY AUTOMATED COUNT: 17.1 % (ref 12.3–15.4)
GFR SERPL CREATININE-BSD FRML MDRD: 64 ML/MIN/1.73
GLOBULIN UR ELPH-MCNC: 4.6 GM/DL
GLUCOSE BLD-MCNC: 144 MG/DL (ref 65–99)
HCO3 BLDA-SCNC: 27.1 MMOL/L (ref 20–26)
HCT VFR BLD AUTO: 33 % (ref 34–46.6)
HCT VFR BLD CALC: 31 %
HGB BLD-MCNC: 10.3 G/DL (ref 12–15.9)
HGB BLDA-MCNC: 10.1 G/DL (ref 14–18)
HOLD SPECIMEN: NORMAL
HOLD SPECIMEN: NORMAL
HOROWITZ INDEX BLD+IHG-RTO: 21 %
IMM GRANULOCYTES # BLD AUTO: 0.1 10*3/MM3 (ref 0–0.05)
IMM GRANULOCYTES NFR BLD AUTO: 0.6 % (ref 0–0.5)
LYMPHOCYTES # BLD AUTO: 2.17 10*3/MM3 (ref 0.7–3.1)
LYMPHOCYTES NFR BLD AUTO: 13.2 % (ref 19.6–45.3)
MAGNESIUM SERPL-MCNC: 1.4 MG/DL (ref 1.6–2.4)
MCH RBC QN AUTO: 28.5 PG (ref 26.6–33)
MCHC RBC AUTO-ENTMCNC: 31.2 G/DL (ref 31.5–35.7)
MCV RBC AUTO: 91.2 FL (ref 79–97)
METHGB BLD QL: 0.9 % (ref 0–1.5)
MODALITY: ABNORMAL
MONOCYTES # BLD AUTO: 2.06 10*3/MM3 (ref 0.1–0.9)
MONOCYTES NFR BLD AUTO: 12.5 % (ref 5–12)
NEUTROPHILS # BLD AUTO: 12.09 10*3/MM3 (ref 1.7–7)
NEUTROPHILS NFR BLD AUTO: 73.2 % (ref 42.7–76)
NOTE: ABNORMAL
NRBC BLD AUTO-RTO: 0 /100 WBC (ref 0–0.2)
NT-PROBNP SERPL-MCNC: 2089 PG/ML (ref 5–900)
OXYHGB MFR BLDV: 90.8 % (ref 94–99)
PCO2 BLDA: 37.5 MM HG (ref 35–45)
PCO2 TEMP ADJ BLD: 37.5 MM HG (ref 35–45)
PH BLDA: 7.47 PH UNITS (ref 7.35–7.45)
PH, TEMP CORRECTED: 7.47 PH UNITS
PLATELET # BLD AUTO: 579 10*3/MM3 (ref 140–450)
PMV BLD AUTO: 10.1 FL (ref 6–12)
PO2 BLDA: 69.6 MM HG (ref 83–108)
PO2 TEMP ADJ BLD: 69.6 MM HG (ref 83–108)
POTASSIUM BLD-SCNC: 4.5 MMOL/L (ref 3.5–5.2)
PROCALCITONIN SERPL-MCNC: 0.37 NG/ML (ref 0.1–0.25)
PROT SERPL-MCNC: 7.7 G/DL (ref 6–8.5)
RBC # BLD AUTO: 3.62 10*6/MM3 (ref 3.77–5.28)
SODIUM BLD-SCNC: 137 MMOL/L (ref 136–145)
TROPONIN T SERPL-MCNC: <0.01 NG/ML (ref 0–0.03)
VENTILATOR MODE: ABNORMAL
WBC NRBC COR # BLD: 16.5 10*3/MM3 (ref 3.4–10.8)
WHOLE BLOOD HOLD SPECIMEN: NORMAL
WHOLE BLOOD HOLD SPECIMEN: NORMAL

## 2020-03-18 PROCEDURE — 85025 COMPLETE CBC W/AUTO DIFF WBC: CPT

## 2020-03-18 PROCEDURE — 80053 COMPREHEN METABOLIC PANEL: CPT | Performed by: EMERGENCY MEDICINE

## 2020-03-18 PROCEDURE — 94799 UNLISTED PULMONARY SVC/PX: CPT

## 2020-03-18 PROCEDURE — 99285 EMERGENCY DEPT VISIT HI MDM: CPT

## 2020-03-18 PROCEDURE — 94640 AIRWAY INHALATION TREATMENT: CPT

## 2020-03-18 PROCEDURE — 84145 PROCALCITONIN (PCT): CPT | Performed by: EMERGENCY MEDICINE

## 2020-03-18 PROCEDURE — 25010000002 PIPERACILLIN SOD-TAZOBACTAM PER 1 G: Performed by: INTERNAL MEDICINE

## 2020-03-18 PROCEDURE — 83880 ASSAY OF NATRIURETIC PEPTIDE: CPT

## 2020-03-18 PROCEDURE — 99223 1ST HOSP IP/OBS HIGH 75: CPT | Performed by: INTERNAL MEDICINE

## 2020-03-18 PROCEDURE — 71250 CT THORAX DX C-: CPT

## 2020-03-18 PROCEDURE — 83735 ASSAY OF MAGNESIUM: CPT | Performed by: FAMILY MEDICINE

## 2020-03-18 PROCEDURE — 25010000002 METHYLPREDNISOLONE PER 125 MG: Performed by: EMERGENCY MEDICINE

## 2020-03-18 PROCEDURE — 25010000002 METHYLPREDNISOLONE PER 40 MG: Performed by: INTERNAL MEDICINE

## 2020-03-18 PROCEDURE — 83605 ASSAY OF LACTIC ACID: CPT | Performed by: EMERGENCY MEDICINE

## 2020-03-18 PROCEDURE — 82805 BLOOD GASES W/O2 SATURATION: CPT

## 2020-03-18 PROCEDURE — 94644 CONT INHLJ TX 1ST HOUR: CPT

## 2020-03-18 PROCEDURE — 0100U HC BIOFIRE FILMARRAY RESP PANEL 2: CPT | Performed by: INTERNAL MEDICINE

## 2020-03-18 PROCEDURE — 87040 BLOOD CULTURE FOR BACTERIA: CPT | Performed by: EMERGENCY MEDICINE

## 2020-03-18 PROCEDURE — 36600 WITHDRAWAL OF ARTERIAL BLOOD: CPT

## 2020-03-18 PROCEDURE — 84484 ASSAY OF TROPONIN QUANT: CPT | Performed by: EMERGENCY MEDICINE

## 2020-03-18 PROCEDURE — 93005 ELECTROCARDIOGRAM TRACING: CPT | Performed by: EMERGENCY MEDICINE

## 2020-03-18 PROCEDURE — 71045 X-RAY EXAM CHEST 1 VIEW: CPT

## 2020-03-18 PROCEDURE — 25010000002 MAGNESIUM SULFATE 2 GM/50ML SOLUTION: Performed by: FAMILY MEDICINE

## 2020-03-18 PROCEDURE — 93005 ELECTROCARDIOGRAM TRACING: CPT

## 2020-03-18 RX ORDER — DILTIAZEM HCL IN NACL,ISO-OSM 125 MG/125
5-15 PLASTIC BAG, INJECTION (ML) INTRAVENOUS
Status: DISCONTINUED | OUTPATIENT
Start: 2020-03-18 | End: 2020-03-21 | Stop reason: HOSPADM

## 2020-03-18 RX ORDER — SODIUM CHLORIDE 9 MG/ML
75 INJECTION, SOLUTION INTRAVENOUS CONTINUOUS
Status: ACTIVE | OUTPATIENT
Start: 2020-03-18 | End: 2020-03-20

## 2020-03-18 RX ORDER — FLUTICASONE PROPIONATE 50 MCG
2 SPRAY, SUSPENSION (ML) NASAL 2 TIMES DAILY
Status: DISCONTINUED | OUTPATIENT
Start: 2020-03-18 | End: 2020-03-21 | Stop reason: HOSPADM

## 2020-03-18 RX ORDER — MAGNESIUM SULFATE HEPTAHYDRATE 40 MG/ML
2 INJECTION, SOLUTION INTRAVENOUS AS NEEDED
Status: DISCONTINUED | OUTPATIENT
Start: 2020-03-18 | End: 2020-03-21 | Stop reason: HOSPADM

## 2020-03-18 RX ORDER — SODIUM CHLORIDE 0.9 % (FLUSH) 0.9 %
10 SYRINGE (ML) INJECTION AS NEEDED
Status: DISCONTINUED | OUTPATIENT
Start: 2020-03-18 | End: 2020-03-21 | Stop reason: HOSPADM

## 2020-03-18 RX ORDER — SODIUM CHLORIDE 0.9 % (FLUSH) 0.9 %
10 SYRINGE (ML) INJECTION EVERY 12 HOURS SCHEDULED
Status: DISCONTINUED | OUTPATIENT
Start: 2020-03-18 | End: 2020-03-21 | Stop reason: HOSPADM

## 2020-03-18 RX ORDER — MAGNESIUM SULFATE HEPTAHYDRATE 40 MG/ML
4 INJECTION, SOLUTION INTRAVENOUS AS NEEDED
Status: DISCONTINUED | OUTPATIENT
Start: 2020-03-18 | End: 2020-03-21 | Stop reason: HOSPADM

## 2020-03-18 RX ORDER — PREDNISONE 20 MG/1
40 TABLET ORAL
Status: DISCONTINUED | OUTPATIENT
Start: 2020-03-19 | End: 2020-03-18

## 2020-03-18 RX ORDER — IPRATROPIUM BROMIDE AND ALBUTEROL SULFATE 2.5; .5 MG/3ML; MG/3ML
3 SOLUTION RESPIRATORY (INHALATION) ONCE
Status: COMPLETED | OUTPATIENT
Start: 2020-03-18 | End: 2020-03-18

## 2020-03-18 RX ORDER — ALBUTEROL SULFATE 2.5 MG/3ML
10 SOLUTION RESPIRATORY (INHALATION)
Status: COMPLETED | OUTPATIENT
Start: 2020-03-18 | End: 2020-03-18

## 2020-03-18 RX ORDER — BUDESONIDE 0.25 MG/2ML
INHALANT ORAL
COMMUNITY
End: 2020-06-10

## 2020-03-18 RX ORDER — METHYLPREDNISOLONE SODIUM SUCCINATE 125 MG/2ML
125 INJECTION, POWDER, LYOPHILIZED, FOR SOLUTION INTRAMUSCULAR; INTRAVENOUS ONCE
Status: COMPLETED | OUTPATIENT
Start: 2020-03-18 | End: 2020-03-18

## 2020-03-18 RX ORDER — TRAZODONE HYDROCHLORIDE 100 MG/1
100 TABLET ORAL NIGHTLY PRN
Status: DISCONTINUED | OUTPATIENT
Start: 2020-03-18 | End: 2020-03-21 | Stop reason: HOSPADM

## 2020-03-18 RX ORDER — BUDESONIDE 0.5 MG/2ML
0.5 INHALANT ORAL
Status: DISCONTINUED | OUTPATIENT
Start: 2020-03-19 | End: 2020-03-21 | Stop reason: HOSPADM

## 2020-03-18 RX ORDER — DIAZEPAM 5 MG/1
5 TABLET ORAL EVERY 12 HOURS PRN
Status: DISCONTINUED | OUTPATIENT
Start: 2020-03-18 | End: 2020-03-21 | Stop reason: HOSPADM

## 2020-03-18 RX ORDER — IPRATROPIUM BROMIDE AND ALBUTEROL SULFATE 2.5; .5 MG/3ML; MG/3ML
3 SOLUTION RESPIRATORY (INHALATION) EVERY 4 HOURS PRN
Status: DISCONTINUED | OUTPATIENT
Start: 2020-03-18 | End: 2020-03-21 | Stop reason: HOSPADM

## 2020-03-18 RX ORDER — PANTOPRAZOLE SODIUM 40 MG/1
40 TABLET, DELAYED RELEASE ORAL
Status: DISCONTINUED | OUTPATIENT
Start: 2020-03-19 | End: 2020-03-21 | Stop reason: HOSPADM

## 2020-03-18 RX ORDER — METHYLPREDNISOLONE SODIUM SUCCINATE 40 MG/ML
40 INJECTION, POWDER, LYOPHILIZED, FOR SOLUTION INTRAMUSCULAR; INTRAVENOUS EVERY 8 HOURS
Status: DISCONTINUED | OUTPATIENT
Start: 2020-03-18 | End: 2020-03-19

## 2020-03-18 RX ADMIN — METHYLPREDNISOLONE SODIUM SUCCINATE 40 MG: 40 INJECTION, POWDER, FOR SOLUTION INTRAMUSCULAR; INTRAVENOUS at 23:20

## 2020-03-18 RX ADMIN — DOXYCYCLINE 100 MG: 100 INJECTION, POWDER, LYOPHILIZED, FOR SOLUTION INTRAVENOUS at 21:26

## 2020-03-18 RX ADMIN — METHYLPREDNISOLONE SODIUM SUCCINATE 125 MG: 125 INJECTION, POWDER, FOR SOLUTION INTRAMUSCULAR; INTRAVENOUS at 18:07

## 2020-03-18 RX ADMIN — SODIUM CHLORIDE 75 ML/HR: 9 INJECTION, SOLUTION INTRAVENOUS at 23:40

## 2020-03-18 RX ADMIN — SODIUM CHLORIDE 1000 ML: 9 INJECTION, SOLUTION INTRAVENOUS at 20:18

## 2020-03-18 RX ADMIN — Medication 5 MG/HR: at 20:18

## 2020-03-18 RX ADMIN — MAGNESIUM SULFATE 2 G: 2 INJECTION INTRAVENOUS at 23:20

## 2020-03-18 RX ADMIN — IPRATROPIUM BROMIDE AND ALBUTEROL SULFATE 3 ML: 2.5; .5 SOLUTION RESPIRATORY (INHALATION) at 18:20

## 2020-03-18 RX ADMIN — TAZOBACTAM SODIUM AND PIPERACILLIN SODIUM 3.38 G: 375; 3 INJECTION, SOLUTION INTRAVENOUS at 23:40

## 2020-03-18 RX ADMIN — ALBUTEROL SULFATE 10 MG: 2.5 SOLUTION RESPIRATORY (INHALATION) at 18:19

## 2020-03-18 RX ADMIN — SODIUM CHLORIDE 1000 ML: 9 INJECTION, SOLUTION INTRAVENOUS at 18:08

## 2020-03-18 NOTE — ED PROVIDER NOTES
Subjective   69-year-old female with a known history of COPD who presents with the complaint of shortness of breath.  The patient reports that with activity she gets significantly short of breath.  This shortness of breath has worsened over last several weeks. She reports while sitting or laying in the bed she does not feel short of breath.  She denies of fever, increased cough, or productivity  of cough.  She denies any chest pain, abdominal pain, change in ostomy output, or urinary symptoms.  Including no dysuria, frequency, or urgency.  The patient reports that this current shortness of breath has been developing over the last 2 months.  She reports that in January she was diagnosed with influenza and bronchitis at [facility] in [place].  She then reports being diagnosed with pneumonia either in  late January or early February at [facility] based off a chest x-ray.  She was initiated on antibiotics at that given time, which she had completed.  She ultimately had a scheduled CT scan of the chest for her pulmonologist on 6th of February that only showed chronic disease and did not show a pneumonia.  She was evaluated on the fourth of this month, March 2020, by her pulmonologist, Dr. Collier, who felt the patient was suffering from COPD exacerbation with confounding asthma.  She reports medications were adjusted that given time and this was shown to be the case from review of the records.  Despite those medication adjustment she is continued to feel more short of breath.  She denies any edema to the extremities bilaterally.  She also had a feeding tube recently removed by Dr. Ireland.  She reports the feeding tube had not been used in several months.  Due to removal of the feeding tube anytime she eats or drinks fluid comes out onto the abdominal surface.  She spoke with her primary care physician about these symptoms and was advised to be checked out in the emergency department with the  concern for possible dehydration or malnutrition.  No definitive sick contacts.  No travel outside the country.  No other reported aggravating, alleviating, or associated symptoms.          Review of Systems   Constitutional: Positive for fatigue. Negative for chills and fever.   HENT: Negative for congestion, ear pain, postnasal drip, sinus pressure and sore throat.    Eyes: Negative for pain, redness and visual disturbance.   Respiratory: Positive for shortness of breath. Negative for cough and chest tightness.    Cardiovascular: Negative for chest pain, palpitations and leg swelling.   Gastrointestinal: Negative for abdominal pain, anal bleeding, blood in stool, diarrhea, nausea and vomiting.   Endocrine: Negative for polydipsia and polyuria.   Genitourinary: Negative for difficulty urinating, dysuria, frequency and urgency.   Musculoskeletal: Negative for arthralgias, back pain and neck pain.   Skin: Negative for pallor and rash.   Allergic/Immunologic: Negative for environmental allergies and immunocompromised state.   Neurological: Negative for dizziness, weakness and headaches.   Hematological: Negative for adenopathy.   Psychiatric/Behavioral: Negative for confusion, self-injury and suicidal ideas. The patient is not nervous/anxious.    All other systems reviewed and are negative.      Past Medical History:   Diagnosis Date   • Breast cancer (CMS/HCC)    • Cancer (CMS/HCC)     lung   • COPD (chronic obstructive pulmonary disease) (CMS/HCC)    • Disease of thyroid gland    • Dysrhythmia     tachycardia   • Frequent PVCs     bigeminal pvc   • Hodgkin's disease (CMS/HCC)    • Hypertension    • Lung cancer (CMS/HCC)    • Ovarian cancer (CMS/HCC)        No Known Allergies    Past Surgical History:   Procedure Laterality Date   • BREAST LUMPECTOMY     • CATARACT EXTRACTION     • CATARACT EXTRACTION W/ INTRAOCULAR LENS IMPLANTW/ TRABECULECTOMY     • COLONOSCOPY      limited due to scar tissue more thatn 10 years  ago   • ENDOSCOPY     • GASTROSTOMY FEEDING TUBE INSERTION  2018    [facility]   • HIP ARTHROSCOPY Right 2011    total hip replacement    • HYSTERECTOMY     • LUNG SURGERY     • RETINAL DETACHMENT REPAIR  2015   • SPLENECTOMY  1980   • THYROIDECTOMY  2012    partial   • UPPER GASTROINTESTINAL ENDOSCOPY  2018 x 3    esophageal strictures by [facility]       Family History   Problem Relation Age of Onset   • Cancer Mother    • Lung cancer Mother    • Heart disease Father    • Heart disease Brother    • Breast cancer Maternal Grandmother    • Colon cancer Neg Hx    • Colon polyps Neg Hx        Social History     Socioeconomic History   • Marital status:      Spouse name: Not on file   • Number of children: Not on file   • Years of education: Not on file   • Highest education level: Not on file   Tobacco Use   • Smoking status: Former Smoker     Packs/day: 3.00     Years: 40.00     Pack years: 120.00     Types: Cigarettes     Last attempt to quit: 5/10/2010     Years since quittin.8   • Smokeless tobacco: Never Used   Substance and Sexual Activity   • Alcohol use: Yes     Alcohol/week: 2.0 standard drinks     Types: 2 Shots of liquor per week     Comment: 2 drinks per day   • Drug use: Yes     Types: Marijuana     Comment: brownie   • Sexual activity: Defer           Objective   Physical Exam   Constitutional: She is oriented to person, place, and time. She appears well-developed and well-nourished.  Non-toxic appearance. No distress.   HENT:   Head: Normocephalic and atraumatic.   Right Ear: External ear normal.   Left Ear: External ear normal.   Nose: Nose normal.   Eyes: Pupils are equal, round, and reactive to light. EOM and lids are normal.   Neck: Normal range of motion. Neck supple. No tracheal deviation present.   Cardiovascular: Normal rate, regular rhythm and normal heart sounds. Exam reveals no gallop, no friction rub and no decreased pulses.   No murmur heard.  Pulmonary/Chest:  Effort normal. No accessory muscle usage. No tachypnea. No respiratory distress. She has no decreased breath sounds. She has wheezes in the right lower field and the left lower field. She has no rhonchi. She has no rales.           Abdominal: Soft. Normal appearance and bowel sounds are normal. There is tenderness (Tenderness around where the feeding tube was recently extracted due to broken down skin.) in the left upper quadrant. There is no rebound, no guarding, no tenderness at McBurney's point and negative Rodríguez's sign.       Musculoskeletal: Normal range of motion. She exhibits no deformity.   Lymphadenopathy:     She has no cervical adenopathy.   Neurological: She is alert and oriented to person, place, and time. She has normal strength. No cranial nerve deficit or sensory deficit.   Skin: Skin is warm and dry. No rash noted. She is not diaphoretic.   Psychiatric: She has a normal mood and affect. Her speech is normal and behavior is normal. Judgment and thought content normal. Cognition and memory are normal.   Nursing note and vitals reviewed.      Procedures           ED Course  ED Course as of Mar 19 0255   Wed Mar 18, 2020   2014 NS paged the hospitalist for admission.    [HV]      ED Course User Index  [HV] Evette White                                           [facility]  Number of Diagnoses or Management Options  Dyspnea, unspecified type: new and requires workup  Leukocytosis, unspecified type: new and requires workup  Lung infiltrate: new and requires workup  SVT (supraventricular tachycardia) (CMS/HCC): new and requires workup  Diagnosis management comments: While here in the ER the patient was witnessed to have multiple episodes of tachycardia that appears consistent with SVT.  The patient was noted to be in and out of this rhythm while simply laying in the bed.  She denied any increase shortness of breath with these episodes.  There was no change in mental status or oxygen saturations with the  episodes of SVT.    I discussed the patient with the on-call cardiologist, Dr. eLroy.  Dr. Leroy suggested diltiazem drip and admission for electrophysiological evaluation.    I discussed the patient with Dr. Clark, who will admit.       Amount and/or Complexity of Data Reviewed  Clinical lab tests: ordered and reviewed  Tests in the radiology section of CPT®: ordered and reviewed  Decide to obtain previous medical records or to obtain history from someone other than the patient: yes  Review and summarize past medical records: yes  Independent visualization of images, tracings, or specimens: yes        Final diagnoses:   Dyspnea, unspecified type   Lung infiltrate   SVT (supraventricular tachycardia) (CMS/HCC)   Leukocytosis, unspecified type            Marcia Laguerre MD  03/19/20 0253

## 2020-03-19 ENCOUNTER — PREP FOR SURGERY (OUTPATIENT)
Dept: OTHER | Facility: HOSPITAL | Age: 70
End: 2020-03-19

## 2020-03-19 ENCOUNTER — ANESTHESIA EVENT (OUTPATIENT)
Dept: GASTROENTEROLOGY | Facility: HOSPITAL | Age: 70
End: 2020-03-19

## 2020-03-19 ENCOUNTER — ANESTHESIA (OUTPATIENT)
Dept: GASTROENTEROLOGY | Facility: HOSPITAL | Age: 70
End: 2020-03-19

## 2020-03-19 ENCOUNTER — APPOINTMENT (OUTPATIENT)
Dept: CARDIOLOGY | Facility: HOSPITAL | Age: 70
End: 2020-03-19

## 2020-03-19 LAB
ALBUMIN SERPL-MCNC: 2.6 G/DL (ref 3.5–5.2)
ALBUMIN/GLOB SERPL: 0.6 G/DL
ALP SERPL-CCNC: 103 U/L (ref 39–117)
ALT SERPL W P-5'-P-CCNC: 9 U/L (ref 1–33)
ANION GAP SERPL CALCULATED.3IONS-SCNC: 18 MMOL/L (ref 5–15)
AST SERPL-CCNC: 15 U/L (ref 1–32)
B PARAPERT DNA SPEC QL NAA+PROBE: NOT DETECTED
B PERT DNA SPEC QL NAA+PROBE: NOT DETECTED
BASOPHILS # BLD AUTO: 0.01 10*3/MM3 (ref 0–0.2)
BASOPHILS NFR BLD AUTO: 0.1 % (ref 0–1.5)
BH CV ECHO MEAS - AI DEC SLOPE: 246.2 CM/SEC^2
BH CV ECHO MEAS - AO MAX PG (FULL): 8.7 MMHG
BH CV ECHO MEAS - AO MAX PG: 11.6 MMHG
BH CV ECHO MEAS - AO MEAN PG (FULL): 3.9 MMHG
BH CV ECHO MEAS - AO MEAN PG: 5.3 MMHG
BH CV ECHO MEAS - AO ROOT AREA (BSA CORRECTED): 2.1
BH CV ECHO MEAS - AO ROOT AREA: 9 CM^2
BH CV ECHO MEAS - AO ROOT DIAM: 3.4 CM
BH CV ECHO MEAS - AO V2 MAX: 170.4 CM/SEC
BH CV ECHO MEAS - AO V2 MEAN: 104.7 CM/SEC
BH CV ECHO MEAS - AO V2 VTI: 24 CM
BH CV ECHO MEAS - ASC AORTA: 3.3 CM
BH CV ECHO MEAS - AVA(I,A): 1.6 CM^2
BH CV ECHO MEAS - AVA(I,D): 1.6 CM^2
BH CV ECHO MEAS - AVA(V,A): 1.6 CM^2
BH CV ECHO MEAS - AVA(V,D): 1.6 CM^2
BH CV ECHO MEAS - BSA(HAYCOCK): 1.6 M^2
BH CV ECHO MEAS - BSA: 1.6 M^2
BH CV ECHO MEAS - BZI_BMI: 21 KILOGRAMS/M^2
BH CV ECHO MEAS - BZI_METRIC_HEIGHT: 165.1 CM
BH CV ECHO MEAS - BZI_METRIC_WEIGHT: 57.2 KG
BH CV ECHO MEAS - EDV(CUBED): 43.4 ML
BH CV ECHO MEAS - EDV(MOD-SP2): 76 ML
BH CV ECHO MEAS - EDV(MOD-SP4): 113 ML
BH CV ECHO MEAS - EDV(TEICH): 51.4 ML
BH CV ECHO MEAS - EF(CUBED): 84.5 %
BH CV ECHO MEAS - EF(MOD-BP): 62 %
BH CV ECHO MEAS - EF(MOD-SP2): 59.2 %
BH CV ECHO MEAS - EF(MOD-SP4): 64.6 %
BH CV ECHO MEAS - EF(TEICH): 78.6 %
BH CV ECHO MEAS - ESV(CUBED): 6.7 ML
BH CV ECHO MEAS - ESV(MOD-SP2): 31 ML
BH CV ECHO MEAS - ESV(MOD-SP4): 40 ML
BH CV ECHO MEAS - ESV(TEICH): 11 ML
BH CV ECHO MEAS - FS: 46.3 %
BH CV ECHO MEAS - IVS/LVPW: 1.1
BH CV ECHO MEAS - IVSD: 1.2 CM
BH CV ECHO MEAS - LA DIMENSION: 3.5 CM
BH CV ECHO MEAS - LA/AO: 1
BH CV ECHO MEAS - LAD MAJOR: 6 CM
BH CV ECHO MEAS - LAT PEAK E' VEL: 7 CM/SEC
BH CV ECHO MEAS - LATERAL E/E' RATIO: 15.4
BH CV ECHO MEAS - LV DIASTOLIC VOL/BSA (35-75): 69.5 ML/M^2
BH CV ECHO MEAS - LV MASS(C)D: 122.3 GRAMS
BH CV ECHO MEAS - LV MASS(C)DI: 75.2 GRAMS/M^2
BH CV ECHO MEAS - LV MAX PG: 2.9 MMHG
BH CV ECHO MEAS - LV MEAN PG: 1.4 MMHG
BH CV ECHO MEAS - LV SYSTOLIC VOL/BSA (12-30): 24.6 ML/M^2
BH CV ECHO MEAS - LV V1 MAX: 85.4 CM/SEC
BH CV ECHO MEAS - LV V1 MEAN: 51.7 CM/SEC
BH CV ECHO MEAS - LV V1 VTI: 11.8 CM
BH CV ECHO MEAS - LVIDD: 3.5 CM
BH CV ECHO MEAS - LVIDS: 1.9 CM
BH CV ECHO MEAS - LVLD AP2: 8.3 CM
BH CV ECHO MEAS - LVLD AP4: 8 CM
BH CV ECHO MEAS - LVLS AP2: 7.4 CM
BH CV ECHO MEAS - LVLS AP4: 7.1 CM
BH CV ECHO MEAS - LVOT AREA (M): 3.1 CM^2
BH CV ECHO MEAS - LVOT AREA: 3.2 CM^2
BH CV ECHO MEAS - LVOT DIAM: 2 CM
BH CV ECHO MEAS - LVPWD: 1.1 CM
BH CV ECHO MEAS - MED PEAK E' VEL: 9.7 CM/SEC
BH CV ECHO MEAS - MEDIAL E/E' RATIO: 11.3
BH CV ECHO MEAS - MV A MAX VEL: 31.8 CM/SEC
BH CV ECHO MEAS - MV DEC SLOPE: 659.4 CM/SEC^2
BH CV ECHO MEAS - MV E MAX VEL: 110.2 CM/SEC
BH CV ECHO MEAS - MV E/A: 3.5
BH CV ECHO MEAS - MV MAX PG: 5.5 MMHG
BH CV ECHO MEAS - MV MEAN PG: 2.6 MMHG
BH CV ECHO MEAS - MV V2 MAX: 117.4 CM/SEC
BH CV ECHO MEAS - MV V2 MEAN: 75.5 CM/SEC
BH CV ECHO MEAS - MV V2 VTI: 19.7 CM
BH CV ECHO MEAS - MVA(VTI): 2 CM^2
BH CV ECHO MEAS - PA ACC SLOPE: 1165 CM/SEC^2
BH CV ECHO MEAS - PA ACC TIME: 0.06 SEC
BH CV ECHO MEAS - PA MAX PG: 2.3 MMHG
BH CV ECHO MEAS - PA PR(ACCEL): 52.1 MMHG
BH CV ECHO MEAS - PA V2 MAX: 75.7 CM/SEC
BH CV ECHO MEAS - RAP SYSTOLE: 8 MMHG
BH CV ECHO MEAS - RVSP: 53 MMHG
BH CV ECHO MEAS - SI(AO): 131.9 ML/M^2
BH CV ECHO MEAS - SI(CUBED): 22.6 ML/M^2
BH CV ECHO MEAS - SI(LVOT): 23.6 ML/M^2
BH CV ECHO MEAS - SI(MOD-SP2): 27.7 ML/M^2
BH CV ECHO MEAS - SI(MOD-SP4): 44.9 ML/M^2
BH CV ECHO MEAS - SI(TEICH): 24.8 ML/M^2
BH CV ECHO MEAS - SV(AO): 214.4 ML
BH CV ECHO MEAS - SV(CUBED): 36.7 ML
BH CV ECHO MEAS - SV(LVOT): 38.4 ML
BH CV ECHO MEAS - SV(MOD-SP2): 45 ML
BH CV ECHO MEAS - SV(MOD-SP4): 73 ML
BH CV ECHO MEAS - SV(TEICH): 40.4 ML
BH CV ECHO MEAS - TAPSE (>1.6): 2 CM2
BH CV ECHO MEAS - TR MAX PG: 45 MMHG
BH CV ECHO MEAS - TR MAX VEL: 336 CM/SEC
BH CV ECHO MEASUREMENTS AVERAGE E/E' RATIO: 13.2
BH CV VAS BP RIGHT ARM: NORMAL MMHG
BH CV XLRA - RV BASE: 3.6 CM
BH CV XLRA - RV LENGTH: 5.7 CM
BH CV XLRA - RV MID: 2.8 CM
BH CV XLRA - TDI S': 15.7 CM/SEC
BILIRUB SERPL-MCNC: 0.3 MG/DL (ref 0.2–1.2)
BUN BLD-MCNC: 18 MG/DL (ref 8–23)
BUN/CREAT SERPL: 30.5 (ref 7–25)
C PNEUM DNA NPH QL NAA+NON-PROBE: NOT DETECTED
CALCIUM SPEC-SCNC: 8.3 MG/DL (ref 8.6–10.5)
CHLORIDE SERPL-SCNC: 100 MMOL/L (ref 98–107)
CO2 SERPL-SCNC: 21 MMOL/L (ref 22–29)
CREAT BLD-MCNC: 0.59 MG/DL (ref 0.57–1)
D-LACTATE SERPL-SCNC: 1.2 MMOL/L (ref 0.5–2)
D-LACTATE SERPL-SCNC: 2.3 MMOL/L (ref 0.5–2)
DEPRECATED RDW RBC AUTO: 57.1 FL (ref 37–54)
EOSINOPHIL # BLD AUTO: 0 10*3/MM3 (ref 0–0.4)
EOSINOPHIL NFR BLD AUTO: 0 % (ref 0.3–6.2)
ERYTHROCYTE [DISTWIDTH] IN BLOOD BY AUTOMATED COUNT: 17.5 % (ref 12.3–15.4)
FLUAV H1 2009 PAND RNA NPH QL NAA+PROBE: NOT DETECTED
FLUAV H1 HA GENE NPH QL NAA+PROBE: NOT DETECTED
FLUAV H3 RNA NPH QL NAA+PROBE: NOT DETECTED
FLUAV SUBTYP SPEC NAA+PROBE: NOT DETECTED
FLUBV RNA ISLT QL NAA+PROBE: NOT DETECTED
GFR SERPL CREATININE-BSD FRML MDRD: 101 ML/MIN/1.73
GLOBULIN UR ELPH-MCNC: 4.5 GM/DL
GLUCOSE BLD-MCNC: 174 MG/DL (ref 65–99)
HADV DNA SPEC NAA+PROBE: NOT DETECTED
HCOV 229E RNA SPEC QL NAA+PROBE: NOT DETECTED
HCOV HKU1 RNA SPEC QL NAA+PROBE: NOT DETECTED
HCOV NL63 RNA SPEC QL NAA+PROBE: NOT DETECTED
HCOV OC43 RNA SPEC QL NAA+PROBE: NOT DETECTED
HCT VFR BLD AUTO: 31.7 % (ref 34–46.6)
HGB BLD-MCNC: 9.7 G/DL (ref 12–15.9)
HMPV RNA NPH QL NAA+NON-PROBE: NOT DETECTED
HPIV1 RNA SPEC QL NAA+PROBE: NOT DETECTED
HPIV2 RNA SPEC QL NAA+PROBE: NOT DETECTED
HPIV3 RNA NPH QL NAA+PROBE: NOT DETECTED
HPIV4 P GENE NPH QL NAA+PROBE: NOT DETECTED
IMM GRANULOCYTES # BLD AUTO: 0.08 10*3/MM3 (ref 0–0.05)
IMM GRANULOCYTES NFR BLD AUTO: 0.5 % (ref 0–0.5)
LACTATE HOLD SPECIMEN: NORMAL
LEFT ATRIUM VOLUME INDEX: 29.5 ML/M^2
LEFT ATRIUM VOLUME: 48 ML
LV EF 2D ECHO EST: 65 %
LYMPHOCYTES # BLD AUTO: 0.66 10*3/MM3 (ref 0.7–3.1)
LYMPHOCYTES NFR BLD AUTO: 4.5 % (ref 19.6–45.3)
M PNEUMO IGG SER IA-ACNC: NOT DETECTED
MAGNESIUM SERPL-MCNC: 3.1 MG/DL (ref 1.6–2.4)
MCH RBC QN AUTO: 27.4 PG (ref 26.6–33)
MCHC RBC AUTO-ENTMCNC: 30.6 G/DL (ref 31.5–35.7)
MCV RBC AUTO: 89.5 FL (ref 79–97)
MONOCYTES # BLD AUTO: 0.68 10*3/MM3 (ref 0.1–0.9)
MONOCYTES NFR BLD AUTO: 4.7 % (ref 5–12)
MRSA DNA SPEC QL NAA+PROBE: NEGATIVE
NEUTROPHILS # BLD AUTO: 13.13 10*3/MM3 (ref 1.7–7)
NEUTROPHILS NFR BLD AUTO: 90.2 % (ref 42.7–76)
NRBC BLD AUTO-RTO: 0.1 /100 WBC (ref 0–0.2)
PLATELET # BLD AUTO: 538 10*3/MM3 (ref 140–450)
PMV BLD AUTO: 10 FL (ref 6–12)
POTASSIUM BLD-SCNC: 3.5 MMOL/L (ref 3.5–5.2)
POTASSIUM BLD-SCNC: 4.3 MMOL/L (ref 3.5–5.2)
PROT SERPL-MCNC: 7.1 G/DL (ref 6–8.5)
RBC # BLD AUTO: 3.54 10*6/MM3 (ref 3.77–5.28)
RHINOVIRUS RNA SPEC NAA+PROBE: NOT DETECTED
RSV RNA NPH QL NAA+NON-PROBE: NOT DETECTED
SODIUM BLD-SCNC: 139 MMOL/L (ref 136–145)
TSH SERPL DL<=0.05 MIU/L-ACNC: 0.04 UIU/ML (ref 0.27–4.2)
WBC NRBC COR # BLD: 14.56 10*3/MM3 (ref 3.4–10.8)

## 2020-03-19 PROCEDURE — 87205 SMEAR GRAM STAIN: CPT | Performed by: FAMILY MEDICINE

## 2020-03-19 PROCEDURE — 25010000002 METHYLPREDNISOLONE PER 40 MG: Performed by: INTERNAL MEDICINE

## 2020-03-19 PROCEDURE — 25010000002 ENOXAPARIN PER 10 MG: Performed by: INTERNAL MEDICINE

## 2020-03-19 PROCEDURE — 99222 1ST HOSP IP/OBS MODERATE 55: CPT | Performed by: INTERNAL MEDICINE

## 2020-03-19 PROCEDURE — 25010000002 VANCOMYCIN 10 G RECONSTITUTED SOLUTION: Performed by: EMERGENCY MEDICINE

## 2020-03-19 PROCEDURE — 0D568ZZ DESTRUCTION OF STOMACH, VIA NATURAL OR ARTIFICIAL OPENING ENDOSCOPIC: ICD-10-PCS | Performed by: INTERNAL MEDICINE

## 2020-03-19 PROCEDURE — 85025 COMPLETE CBC W/AUTO DIFF WBC: CPT | Performed by: INTERNAL MEDICINE

## 2020-03-19 PROCEDURE — 93306 TTE W/DOPPLER COMPLETE: CPT | Performed by: INTERNAL MEDICINE

## 2020-03-19 PROCEDURE — 80053 COMPREHEN METABOLIC PANEL: CPT | Performed by: INTERNAL MEDICINE

## 2020-03-19 PROCEDURE — 93010 ELECTROCARDIOGRAM REPORT: CPT | Performed by: INTERNAL MEDICINE

## 2020-03-19 PROCEDURE — 25010000002 PIPERACILLIN SOD-TAZOBACTAM PER 1 G: Performed by: INTERNAL MEDICINE

## 2020-03-19 PROCEDURE — 93306 TTE W/DOPPLER COMPLETE: CPT

## 2020-03-19 PROCEDURE — 93005 ELECTROCARDIOGRAM TRACING: CPT | Performed by: INTERNAL MEDICINE

## 2020-03-19 PROCEDURE — 93005 ELECTROCARDIOGRAM TRACING: CPT | Performed by: PHYSICIAN ASSISTANT

## 2020-03-19 PROCEDURE — 25010000002 MAGNESIUM SULFATE 2 GM/50ML SOLUTION: Performed by: FAMILY MEDICINE

## 2020-03-19 PROCEDURE — 83605 ASSAY OF LACTIC ACID: CPT | Performed by: EMERGENCY MEDICINE

## 2020-03-19 PROCEDURE — 83605 ASSAY OF LACTIC ACID: CPT | Performed by: INTERNAL MEDICINE

## 2020-03-19 PROCEDURE — 25010000002 PROPOFOL 10 MG/ML EMULSION: Performed by: NURSE ANESTHETIST, CERTIFIED REGISTERED

## 2020-03-19 PROCEDURE — 99233 SBSQ HOSP IP/OBS HIGH 50: CPT | Performed by: INTERNAL MEDICINE

## 2020-03-19 PROCEDURE — 87641 MR-STAPH DNA AMP PROBE: CPT | Performed by: INTERNAL MEDICINE

## 2020-03-19 PROCEDURE — 84132 ASSAY OF SERUM POTASSIUM: CPT | Performed by: INTERNAL MEDICINE

## 2020-03-19 PROCEDURE — 43255 EGD CONTROL BLEEDING ANY: CPT | Performed by: INTERNAL MEDICINE

## 2020-03-19 PROCEDURE — 94799 UNLISTED PULMONARY SVC/PX: CPT

## 2020-03-19 PROCEDURE — 84443 ASSAY THYROID STIM HORMONE: CPT | Performed by: INTERNAL MEDICINE

## 2020-03-19 PROCEDURE — 87070 CULTURE OTHR SPECIMN AEROBIC: CPT | Performed by: FAMILY MEDICINE

## 2020-03-19 PROCEDURE — 83735 ASSAY OF MAGNESIUM: CPT | Performed by: NURSE PRACTITIONER

## 2020-03-19 DEVICE — SYS CLIP GI OTSC NITNL 12/16T 165CM: Type: IMPLANTABLE DEVICE | Site: STOMACH | Status: FUNCTIONAL

## 2020-03-19 RX ORDER — METHYLPREDNISOLONE SODIUM SUCCINATE 40 MG/ML
40 INJECTION, POWDER, LYOPHILIZED, FOR SOLUTION INTRAMUSCULAR; INTRAVENOUS EVERY 12 HOURS
Status: COMPLETED | OUTPATIENT
Start: 2020-03-19 | End: 2020-03-20

## 2020-03-19 RX ORDER — LIDOCAINE HYDROCHLORIDE 10 MG/ML
INJECTION, SOLUTION EPIDURAL; INFILTRATION; INTRACAUDAL; PERINEURAL AS NEEDED
Status: DISCONTINUED | OUTPATIENT
Start: 2020-03-19 | End: 2020-03-19 | Stop reason: SURG

## 2020-03-19 RX ORDER — ACETAMINOPHEN 325 MG/1
650 TABLET ORAL EVERY 6 HOURS PRN
Status: DISCONTINUED | OUTPATIENT
Start: 2020-03-19 | End: 2020-03-21 | Stop reason: HOSPADM

## 2020-03-19 RX ORDER — POTASSIUM CHLORIDE 750 MG/1
40 CAPSULE, EXTENDED RELEASE ORAL AS NEEDED
Status: DISCONTINUED | OUTPATIENT
Start: 2020-03-19 | End: 2020-03-21 | Stop reason: HOSPADM

## 2020-03-19 RX ORDER — HYDROCODONE BITARTRATE AND ACETAMINOPHEN 5; 325 MG/1; MG/1
1 TABLET ORAL EVERY 6 HOURS PRN
Status: DISCONTINUED | OUTPATIENT
Start: 2020-03-19 | End: 2020-03-21 | Stop reason: HOSPADM

## 2020-03-19 RX ORDER — FENTANYL CITRATE 50 UG/ML
50 INJECTION, SOLUTION INTRAMUSCULAR; INTRAVENOUS
Status: DISCONTINUED | OUTPATIENT
Start: 2020-03-19 | End: 2020-03-19 | Stop reason: HOSPADM

## 2020-03-19 RX ORDER — SODIUM CHLORIDE 9 MG/ML
125 INJECTION, SOLUTION INTRAVENOUS CONTINUOUS
Status: ACTIVE | OUTPATIENT
Start: 2020-03-19 | End: 2020-03-19

## 2020-03-19 RX ORDER — PROPOFOL 10 MG/ML
VIAL (ML) INTRAVENOUS AS NEEDED
Status: DISCONTINUED | OUTPATIENT
Start: 2020-03-19 | End: 2020-03-19 | Stop reason: SURG

## 2020-03-19 RX ORDER — FAMOTIDINE 10 MG/ML
20 INJECTION, SOLUTION INTRAVENOUS ONCE
Status: COMPLETED | OUTPATIENT
Start: 2020-03-19 | End: 2020-03-19

## 2020-03-19 RX ORDER — POTASSIUM CHLORIDE 1.5 G/1.77G
40 POWDER, FOR SOLUTION ORAL AS NEEDED
Status: DISCONTINUED | OUTPATIENT
Start: 2020-03-19 | End: 2020-03-21 | Stop reason: HOSPADM

## 2020-03-19 RX ORDER — SODIUM CHLORIDE, SODIUM LACTATE, POTASSIUM CHLORIDE, CALCIUM CHLORIDE 600; 310; 30; 20 MG/100ML; MG/100ML; MG/100ML; MG/100ML
20 INJECTION, SOLUTION INTRAVENOUS CONTINUOUS
Status: DISCONTINUED | OUTPATIENT
Start: 2020-03-19 | End: 2020-03-21 | Stop reason: HOSPADM

## 2020-03-19 RX ORDER — IPRATROPIUM BROMIDE AND ALBUTEROL SULFATE 2.5; .5 MG/3ML; MG/3ML
3 SOLUTION RESPIRATORY (INHALATION) ONCE
Status: COMPLETED | OUTPATIENT
Start: 2020-03-19 | End: 2020-03-19

## 2020-03-19 RX ADMIN — METHYLPREDNISOLONE SODIUM SUCCINATE 40 MG: 40 INJECTION, POWDER, FOR SOLUTION INTRAMUSCULAR; INTRAVENOUS at 06:59

## 2020-03-19 RX ADMIN — METHYLPREDNISOLONE SODIUM SUCCINATE 40 MG: 40 INJECTION, POWDER, FOR SOLUTION INTRAMUSCULAR; INTRAVENOUS at 17:01

## 2020-03-19 RX ADMIN — TAZOBACTAM SODIUM AND PIPERACILLIN SODIUM 3.38 G: 375; 3 INJECTION, SOLUTION INTRAVENOUS at 06:59

## 2020-03-19 RX ADMIN — FLUTICASONE PROPIONATE 2 SPRAY: 50 SPRAY, METERED NASAL at 20:39

## 2020-03-19 RX ADMIN — SODIUM CHLORIDE 125 ML/HR: 9 INJECTION, SOLUTION INTRAVENOUS at 08:36

## 2020-03-19 RX ADMIN — TAZOBACTAM SODIUM AND PIPERACILLIN SODIUM 3.38 G: 375; 3 INJECTION, SOLUTION INTRAVENOUS at 22:18

## 2020-03-19 RX ADMIN — HYDROCODONE BITARTRATE AND ACETAMINOPHEN 1 TABLET: 5; 325 TABLET ORAL at 09:44

## 2020-03-19 RX ADMIN — HYDROCODONE BITARTRATE AND ACETAMINOPHEN 1 TABLET: 5; 325 TABLET ORAL at 16:59

## 2020-03-19 RX ADMIN — SODIUM CHLORIDE, PRESERVATIVE FREE 10 ML: 5 INJECTION INTRAVENOUS at 20:40

## 2020-03-19 RX ADMIN — DOXYCYCLINE 100 MG: 100 INJECTION, POWDER, LYOPHILIZED, FOR SOLUTION INTRAVENOUS at 20:40

## 2020-03-19 RX ADMIN — PANTOPRAZOLE SODIUM 40 MG: 40 TABLET, DELAYED RELEASE ORAL at 06:59

## 2020-03-19 RX ADMIN — MAGNESIUM SULFATE 2 G: 2 INJECTION INTRAVENOUS at 01:23

## 2020-03-19 RX ADMIN — ENOXAPARIN SODIUM 40 MG: 40 INJECTION SUBCUTANEOUS at 06:59

## 2020-03-19 RX ADMIN — LIDOCAINE HYDROCHLORIDE 50 MG: 10 INJECTION, SOLUTION EPIDURAL; INFILTRATION; INTRACAUDAL; PERINEURAL at 14:59

## 2020-03-19 RX ADMIN — SODIUM CHLORIDE 125 ML/HR: 9 INJECTION, SOLUTION INTRAVENOUS at 11:27

## 2020-03-19 RX ADMIN — VANCOMYCIN HYDROCHLORIDE 1500 MG: 10 INJECTION, POWDER, LYOPHILIZED, FOR SOLUTION INTRAVENOUS at 00:59

## 2020-03-19 RX ADMIN — MAGNESIUM SULFATE 2 G: 2 INJECTION INTRAVENOUS at 03:23

## 2020-03-19 RX ADMIN — PROPOFOL 20 MG: 10 INJECTION, EMULSION INTRAVENOUS at 15:07

## 2020-03-19 RX ADMIN — DOXYCYCLINE 100 MG: 100 INJECTION, POWDER, LYOPHILIZED, FOR SOLUTION INTRAVENOUS at 11:26

## 2020-03-19 RX ADMIN — FLUTICASONE PROPIONATE 2 SPRAY: 50 SPRAY, METERED NASAL at 08:34

## 2020-03-19 RX ADMIN — BUDESONIDE 0.5 MG: 0.5 INHALANT RESPIRATORY (INHALATION) at 07:55

## 2020-03-19 RX ADMIN — METOPROLOL TARTRATE 50 MG: 25 TABLET, FILM COATED ORAL at 20:40

## 2020-03-19 RX ADMIN — POTASSIUM CHLORIDE 40 MEQ: 1.5 POWDER, FOR SOLUTION ORAL at 11:31

## 2020-03-19 RX ADMIN — TAZOBACTAM SODIUM AND PIPERACILLIN SODIUM 3.38 G: 375; 3 INJECTION, SOLUTION INTRAVENOUS at 16:20

## 2020-03-19 RX ADMIN — IPRATROPIUM BROMIDE AND ALBUTEROL SULFATE 3 ML: 2.5; .5 SOLUTION RESPIRATORY (INHALATION) at 15:47

## 2020-03-19 RX ADMIN — PROPOFOL 80 MG: 10 INJECTION, EMULSION INTRAVENOUS at 14:59

## 2020-03-19 RX ADMIN — PROPOFOL 20 MG: 10 INJECTION, EMULSION INTRAVENOUS at 15:03

## 2020-03-19 RX ADMIN — SODIUM CHLORIDE, POTASSIUM CHLORIDE, SODIUM LACTATE AND CALCIUM CHLORIDE 20 ML/HR: 600; 310; 30; 20 INJECTION, SOLUTION INTRAVENOUS at 13:48

## 2020-03-19 RX ADMIN — METOPROLOL TARTRATE 50 MG: 25 TABLET, FILM COATED ORAL at 08:33

## 2020-03-19 RX ADMIN — FAMOTIDINE 20 MG: 10 INJECTION INTRAVENOUS at 13:48

## 2020-03-19 NOTE — PLAN OF CARE
Problem: Patient Care Overview  Goal: Plan of Care Review  Outcome: Ongoing (interventions implemented as appropriate)  Flowsheets (Taken 3/19/2020 9864)  Progress: improving  Plan of Care Reviewed With: patient  Outcome Summary: patient in a.fib at the first of shift after egd patient in NSR with PVCS, VSS, on 2 liters NC, patient started on clear liquid diet, fall precautions in place, will continue to monitor

## 2020-03-19 NOTE — H&P
[facility]  HISTORY AND PHYSICAL    Patient Name: Ita Pickett  : 1950  MRN: [id number]  Primary Care Physician: Lucho Peters MD  Date of admission: 3/18/2020      Subjective   Subjective     Chief Complaint:  Shortness of breath x several weeks  Sepsis  Lactic acidosis    HPI:  Ita Pickett is a 69 y.o. female with history of COPD, history of four primary malignancies- breat, ovarian, hodgkins, lung who presents today with shortness of breath x several weeks. Patient reported to be diagnosed with the flu and bronchitis and was treated at [facility] in [place] in . Improved but then subsequently developed PNA and treated with abx at [facility] in Feb. Patient reportedly had a CT chest  that only showed chronic disease and did not show a pneumonia.  She was evaluated on the fourth of this month, 2020, by her pulmonologist, Dr. Collier, who felt the patient was suffering from COPD exacerbation with confounding asthma. Medications were adjusted at this time, however patient has continued to feel more short of breath since this change. Patient reports seeing her PCP earlier this week and given her continued symptoms, PCP instructed patient to seek further care in ED. Denies fevers, but patient does endorse night sweats. Reports that her boyfriend has been sick with a cold, but denies any other sick contacts. Denies any chest pain, HA, any other respiratory complaints.     In the ED, patient noted to be tachycardic to as high as 200 with concern of SVT, cardiology, Dr Leroy, was notified by ED who recommended initiating patient on diltazem gtt, which improved her rate to the 120s. ED workup also notable for leukocytosis to 16k . CXR noted to have left basilar atelectasis with ? Faint infiltrate, though CT chest was done without clear evidence of infiltrate and favored atelectasis with small pleural effusions, also noted moderate  pericardial effusion.     Also of note, patient reports having recent PEG tube removal with Dr Mcintosh last week, and reports some leakage from this site since removal. Patient denies pain or purulence from this site.       Review of Systems   Gen- No fevers, chills, +night sweats  CV- No chest pain, palpitations  Resp- as above  GI- No N/V/D, abd pain      All other systems reviewed and are negative.     Personal History     Past Medical History:   Diagnosis Date   • Breast cancer (CMS/HCC)    • Cancer (CMS/HCC)     lung   • COPD (chronic obstructive pulmonary disease) (CMS/HCC)    • Disease of thyroid gland    • Dysrhythmia     tachycardia   • Frequent PVCs     bigeminal pvc   • Hodgkin's disease (CMS/HCC)    • Hypertension    • Lung cancer (CMS/HCC)    • Ovarian cancer (CMS/HCC)        Past Surgical History:   Procedure Laterality Date   • BREAST LUMPECTOMY     • CATARACT EXTRACTION     • CATARACT EXTRACTION W/ INTRAOCULAR LENS IMPLANTW/ TRABECULECTOMY     • COLONOSCOPY      limited due to scar tissue more thatn 10 years ago   • ENDOSCOPY     • GASTROSTOMY FEEDING TUBE INSERTION  2018    [facility]   • HIP ARTHROSCOPY Right 2011    total hip replacement    • HYSTERECTOMY     • LUNG SURGERY  2000   • RETINAL DETACHMENT REPAIR  2015   • SPLENECTOMY  1980   • THYROIDECTOMY  2012    partial   • UPPER GASTROINTESTINAL ENDOSCOPY  2018 x 3    esophageal strictures by [facility]       Family History: family history includes Breast cancer in her maternal grandmother; Cancer in her mother; Heart disease in her brother and father; Lung cancer in her mother. Otherwise pertinent FHx was reviewed and unremarkable.     Social History:  reports that she quit smoking about 9 years ago. Her smoking use included cigarettes. She has a 120.00 pack-year smoking history. She has never used smokeless tobacco. She reports that she drinks about 2.0 standard drinks of alcohol per week. She reports that she has current or  past drug history. Drug: Marijuana.  Social History     Social History Narrative   • Not on file       Medications:  Available home medication information reviewed.    (Not in a hospital admission)    No Known Allergies    Objective   Objective     Vital Signs:   Temp:  [98.1 °F (36.7 °C)-98.7 °F (37.1 °C)] 98.7 °F (37.1 °C)  Heart Rate:  [] 141  Resp:  [24] 24  BP: (125-144)/(65-83) 139/80        Physical Exam   GEN- appears chronically ill, resting in bed, on RA   HEENT- atraumatic, normocephlic, eomi  NECK- supple, trachea midline, no masses  RESP: slightly diminished, good effort   CV: tachycardic in mid 120s, no murmurs heard   GI: PEG tube site noted with erythema surrounding  MSK: no edema noted, spontaneous movement of all extremities  NEURO: alert, oriented, no focal deficits  SKIN: no rashes  PSYCH: appropriate mood and affect     Results Reviewed:  I have personally reviewed current lab and radiology data.    Results from last 7 days   Lab Units 03/18/20  1632   WBC 10*3/mm3 16.50*   HEMOGLOBIN g/dL 10.3*   HEMATOCRIT % 33.0*   PLATELETS 10*3/mm3 579*     Results from last 7 days   Lab Units 03/18/20 2106 03/18/20  1632   SODIUM mmol/L  --  137   POTASSIUM mmol/L  --  4.5   CHLORIDE mmol/L  --  91*   CO2 mmol/L  --  30.0*   BUN mg/dL  --  21   CREATININE mg/dL  --  0.87   GLUCOSE mg/dL  --  144*   CALCIUM mg/dL  --  9.3   ALT (SGPT) U/L  --  9   AST (SGOT) U/L  --  22   TROPONIN T ng/mL  --  <0.010   PROBNP pg/mL  --  2,089.0*   LACTATE mmol/L 4.6*  --    PROCALCITONIN ng/mL 0.37*  --      Estimated Creatinine Clearance: 54.3 mL/min (by C-G formula based on SCr of 0.87 mg/dL).  Brief Urine Lab Results     None        Imaging Results (Last 24 Hours)     Procedure Component Value Units Date/Time    CT Chest Without Contrast [[id number]] Collected:  03/18/20 2112     Updated:  03/18/20 2114    Narrative:       CT Chest WO    INDICATION:   69-year-old female with shortness of breath one week. COPD and  lung cancer.    TECHNIQUE:   CT of the thorax without IV contrast. Coronal and sagittal reconstructions were obtained.  Radiation dose reduction techniques included automated exposure control or exposure modulation based on body size. Count of known CT and cardiac nuc med studies  performed in previous 12 months: 1.     COMPARISON:   2/26/2020    FINDINGS:  Lungs demonstrates sequela of COPD and there are postoperative changes related to lower lobectomy on the right. Trace amount of pleural fluid bilaterally right greater than left, new compared to the prior study with some probable bibasilar atelectasis. A  6 mm noncalcified nodule in the subpleural left lower lobe is unchanged. A subcentimeter noncalcified nodule in the right upper lobe is also stable. There is linear fibrotic change in the right upper lobe peripherally. No new dense consolidation  identified. Fibrotic changes in the lung apices right more prominent than left are stable and may reflect sequela of radiation therapy given imaging features. Central airways are patent. No pneumothorax.    There is a multinodular right thyroid lobe that is unchanged. The heart is enlarged and there is atherosclerotic change of the aorta. A right pretracheal lymph node measures 7 mm short axis, slightly larger than on the prior study when it measured 4 mm.  A precarinal lymph node now measures 7 mm previously only about 3 mm. There is a prominent subcarinal lymph node stable to larger compared to the prior study as well. There is reflux in the esophagus. There is a new at least moderate to moderately large  pericardial effusion measuring up to 3.1 cm.    Included upper abdomen demonstrates a splenule in the left upper quadrant and postoperative changes of probable splenectomy. Visualized left kidney is atrophic. No suspicious bone lesion.      Impression:         1. Background emphysematous changes with new trace pleural effusions and probable bibasilar atelectasis  rather than faint bibasilar pneumonia.  2. New moderate to moderately large pericardial effusion measuring up to 3.1 cm maximum thickness.  3. Probable sequela of radiation therapy in the lung apices and right lower lobectomy. Indeterminate noncalcified nodules in the right upper lobe and left lower lobe are unchanged.  4. Stable to slight interval enlargement of mediastinal lymph nodes, nonspecific and should be correlated clinically given the history of malignancy.  5. Gastroesophageal reflux.    Signer Name: Mario Alberto Heller MD   Signed: 3/18/2020 9:12 PM   Workstation Name: [facility] [email or web address]    [facility]    XR Chest 1 View [[id number]] Collected:  03/18/20 2007     Updated:  03/18/20 2010    Narrative:       CR Chest 1 Vw    INDICATION:   69-year-old female with shortness of breath 2 weeks. Worsening symptoms of past 4 days. Recent pneumonia COPD. Partial pneumonectomy on the right.     COMPARISON:    11/6/2018 and CT chest 2/6/2020    FINDINGS:  Single portable AP view(s) of the chest.  Metallic clips project over the left hemidiaphragm. Cardiac silhouette borderline enlarged and stable. The lungs demonstrate sequela of COPD with multifocal scarring and fibrotic change. Persistent area of less  prominent fibrosis in the midlung zone on the right along with curvilinear calcifications in the right lung apex. Chronic right-sided volume loss and probable pleural reaction accounting for blunting of the right CP angle. There is new left basilar  atelectasis or less likely faint infiltrate. No pneumothorax.      Impression:         1. Left basilar atelectasis. Faint infiltrate. Chronic COPD and postoperative changes on the right. No pneumothorax.    Signer Name: Mario Alberto Heller MD   Signed: 3/18/2020 8:07 PM   Workstation Name: [facility] [[email or web address]    [facility]             Assessment/Plan   Assessment & Plan     Active Hospital Problems    Diagnosis POA   • Dyspnea [R06.00] Yes   • SVT  (supraventricular tachycardia) (CMS/HCC) [I47.1] Unknown   • Pericardial effusion [I31.3] Unknown   • Sepsis, unspecified organism (CMS/HCC) [A41.9] Unknown   • Lactic acidosis [E87.2] Unknown   • Hodgkin lymphoma (CMS/HCC) [C81.90] Yes   • Breast cancer (CMS/[facility]) [C50.919] Yes   • History of lung cancer [Z85.118] Not Applicable   • COPD (chronic obstructive pulmonary disease) (CMS/[facility]) [J44.9] Yes     Overview:   History: COPD.  PTA was on Stiolto inhalers  Assessment:   98% on room air  Plan:    - monitor sats  - Spiriva while inhouse  - cough and deep breath, Vibrapep  .         Ita Pickett is a 69 y.o. female with history of COPD, history of four primary malignancies- breast, ovarian, hodgkins, lung who presents today with shortness of breath x several weeks. Patient noted to have findings c/w COPD exacerbation, however further workup notable for SVT with significant tachycardia, leukocytosis, lactic acidosis as well as new pericardial effusion. Patient will be admitted to hospitalist service for further management.    Dyspnea   COPD with acute exacerbation  Leukocytosis  ---will place on broad spectrum abx with vanc/zosyn/doxy given significant elevation of lactate, no evidence of clear PNA on CT imaging  ---continue IV steroids   ---nebs, pulmonary toilet, respiratory viral panel pending  ---low risk for COVID given CT findings and clinical picture c/w COPD exacerbation and dyspnea favored to be exacerbated by cardiac issues below    Sepsis secondary to above   Lactic acidosis   ---multifactorial given significant SVT/tachycardia as well as COPD exac and possible contribution from cellulitis as below  ---trend, monitor closely     SVT  Pericardial effusion  ---cardiology to see formally in AM- d/w Dr Michelle gan and have placed order for STAT ECHO given inability to r/o tamponade physiology and new large pericardial effusion, pending at this time, though patient is Hemodynamically stable  ----gentle IVF  at this time   ---continue dilt gtt, oral metoprolol      Recent PEG tube removal with possible surrounding cellulitis  ---noted erythema surrounding site  ---place on abx as above, follow cultures  ---consider involvement of GI-- recently removed by Dr Mcintosh    History of breast, lung, ovarian, Hogdkin's  ---continue close outpatient follow up, noted mediastinal EVAN on CT     DVT prophylaxis:  lovenox    CODE STATUS:    Code Status and Medical Interventions:   Ordered at: 03/18/20 9613     Limited Support to NOT Include:    Vasopressors    Intubation    Cardioversion/Defibrillation     Code Status:    No CPR     Medical Interventions (Level of Support Prior to Arrest):    Limited     Comments:    d/w patient, she has a living will reflective of this       Admission Status:  I believe this patient meets INPATIENT status due to need for IV abx, close monitoring, subspeciality evaluation.  I feel patient’s risk for adverse outcomes and need for care warrant INPATIENT evaluation and I predict the patient’s care encounter to likely last beyond 2 midnights.      Electronically signed by Maria Victoria Fajardo MD, 03/18/20, 8:42 PM.

## 2020-03-19 NOTE — CONSULTS
[facility] - Cardiology Consult    Ita Pickett  1950  [id number]/1    Admit Date:  3/18/2020    Consult Date:  03/19/20        PCP:  Lucho Peters MD Req MD:  Dr. Maria Victoria Fajardo - Hospitalist  Consulting MD:  Dr. Bassam Leroy - Cardiology        CC:  Dyspnea    Reason for Consult: Paroxysmal Arrhythmias - SVT, AFib    PROBLEM LIST/PMHx:  1.  Paroxysmal Arrhythmias (SVT, AFib)   A.  Presents to [facility] in NSR, occasional PVC.  SVT noted.  Converted to AFiv with IV Diltiazem.   B.  Echo 3/18/2020 Dr. Martinez:  EF 65%, mod AR/mild MR/ Mod-sev TR with RVSP 45-55mmHg.  Moderate circumferential pericardial effusion with a maximal diameter of 2.3 cm that is predominantly located adjacent to the right ventricle. There was no evidence of cardiac   tamponade. The right ventricle did not demonstrate diastolic collapse.    C.  CHADS2 Vasc = 3 (HTN, Age, Female).  2.  Dyspnea  3.  COPD (chronic obstructive pulmonary disease) (CMS/[facility])   A.  Former Smoking Tobacco abuse, quit 2010  4.  Essential Hypertension  5.  H/O Squamous Cell lung cancer   A.  S/P RLL resection, 2000  6.  H/O Breast cancer (CMS/HCC), s/p lumpectomy  7.  H/O Hodgkin lymphoma (CMS/HCC), 1980   A.  Chest RT   B.  S/P spenectomy  8.  H/O Ovarian Cancer, 1975   A.  S/P PINKY    B.  S/P RT   9.  Esophageal Stricture   A.  Followed previously by [facility]   B.  Type II Achalasia   C.  Last esophageal dilation, 3/11/2020   D.  Previous PEG s/p removal  10.  Sepsis, unspecified organism (CMS/[facility])  11.  H/O Partial thyroidectomy      Allergies:  has No Known Allergies.    Medications Prior to Admission   Medication Sig Dispense Refill Last Dose   • budesonide (PULMICORT) 0.25 MG/2ML nebulizer solution Take  by nebulization Daily.      • Calcium Carbonate-Vit D-Min (CALCIUM 1200 PO) Take  by mouth.   Patient Taking Differently at Unknown time   • Cholecalciferol (VITAMIN D3) 5000 units tablet tablet Take 5,000 Units by  mouth Daily.   Patient Taking Differently at Unknown time   • Fluticasone-Umeclidin-Vilant (TRELEGY ELLIPTA IN) Inhale Daily.   Patient Taking Differently at Unknown time   • diazePAM (VALIUM) 5 MG tablet if needed.   Taking   • fluticasone (FLONASE) 50 MCG/ACT nasal spray 2 sprays into the nostril(s) as directed by provider 2 (Two) Times a Day. 1 bottle 11    • ibuprofen (ADVIL,MOTRIN) 100 MG/5ML suspension Take  by mouth Every 8 (Eight) Hours As Needed for Mild Pain .   Taking   • levocetirizine (XYZAL) 5 MG tablet Take 1 tablet by mouth Every Evening. 30 tablet 11    • metoprolol tartrate (LOPRESSOR) 50 MG tablet Take 50 mg by mouth Daily.   Taking   • montelukast (SINGULAIR) 10 MG tablet TAKE ONE TABLET BY MOUTH EVERY NIGHT 90 tablet 10    • omeprazole (priLOSEC) 20 MG capsule Take 20 mg by mouth Daily.   Taking   • traZODone (DESYREL) 100 MG tablet Take 100 mg by mouth As Needed.   Taking   • vitamin C (ASCORBIC ACID) 500 MG tablet Take 500 mg by mouth 2 (Two) Times a Day.   Taking       Current Scheduled Meds:  [START ON 3/20/2020] Pharmacy Consult  Does not apply Once   budesonide 0.5 mg Nebulization Daily - RT   doxycycline 100 mg Intravenous Q12H   enoxaparin 40 mg Subcutaneous Q24H   fluticasone 2 spray Nasal BID   methylPREDNISolone sodium succinate 40 mg Intravenous Q12H   metoprolol tartrate 50 mg Oral Daily   pantoprazole 40 mg Oral Q AM   piperacillin-tazobactam 3.375 g Intravenous Q8H   sodium chloride 10 mL Intravenous Q12H   vancomycin 15 mg/kg Intravenous Q18H     Continuous Infusions:  dilTIAZem 5-15 mg/hr Last Rate: Stopped (03/19/20 0918)   Pharmacy to dose vancomycin     sodium chloride 75 mL/hr Last Rate: 75 mL/hr (03/18/20 8530)   sodium chloride 125 mL/hr Last Rate: 125 mL/hr (03/19/20 0836)               CARDIAC RISK FACTORS:   advanced age (older than 55 for men, 65 for women), hypertension, sedentary lifestyle and smoking/ tobacco exposure.         HPI:  Ita Pickett is a 68 yo CF  with a complex medical history of HTN, former tobacco/COPD, History of Breast/ovarian/lung/Hodgkins cancer, esophageal strictures/achalasia who was recently seen by Pulmonology as an outpatient for lung nodules found on CT Chest.  At that time she saw Dr. Gage Collier and shared with him that she was diagnosed with both Influenza and Pneumonia Jan/Feb 2020 and treated by local MDs with outpatient antibiotics.  She continues to have a cough and reported significant exertional dyspnea with minimal activity. Simple things such as showering/putting on clothes cause her symptoms.  He felt her lung nodules were stable and added nebs/flonase to her medical regimen.  She also underwent PEG removal (Dr. Carrillo) and reports fluid coming out of the area where her PEG once was.      She presents to [facility] ED yesterday with progressive dyspnea now at rest, despite the addition of nebs/medications.  She denies fevers, denies chills, denies orthopnea/PND, denies LE Edema or other CHF type symptoms.  She was noted to have brief runs of SVT in the ED - asymptomatic without compromise to BP.  Upon arrival, she was in NSR with , occasional PVC.  At time of SVT, HR elevated to 180s.  She was started on IV Cardizem which controlled rates to 120. CT Chest in ED shows new trace of pleural effusions with bibasilar atelectasis.  Also reported is a pericardial effusion measuring max 3.1 cm.   Ms. Pickett has been admitted to the hospitalist and cardiology was asked by Dr. Fajardo to see her in consultation for her arrhythmias.     She remains on IV Cardizem and has now converted to Atrial Fibrillation.  A stat echo ordered for the pericardial effusion was also performed (results noted above in problem list).  She is sitting up in beside chair, daughter present.  She, at rest, is breathing better.  She denies chest pain, denies palpitations or irregular heart beats.  Since admission, she is unaware of the arrhythmias.  She does  "mention that ~ 1 month ago, she had \"full chest\" pain but denies recurrence.  She cannot recall if she's had any prior cardiac workup (echo/stress test).  Her father had CAD and states she had a brother who had an MI at an early age.  She is compliant with home medications.  She socially drinks and denies excessive caffeine intake.  She quit smoking years ago and admits to recreational marijuana use.          Social History     Socioeconomic History   • Marital status:      Spouse name: Not on file   • Number of children: Not on file   • Years of education: Not on file   • Highest education level: Not on file   Tobacco Use   • Smoking status: Former Smoker     Packs/day: 3.00     Years: 40.00     Pack years: 120.00     Types: Cigarettes     Last attempt to quit: 5/10/2010     Years since quittin.8   • Smokeless tobacco: Never Used   Substance and Sexual Activity   • Alcohol use: Yes     Alcohol/week: 2.0 standard drinks     Types: 2 Shots of liquor per week     Comment: 2 drinks per day   • Drug use: Yes     Types: Marijuana     Comment: brownie   • Sexual activity: Defer     Family History   Problem Relation Age of Onset   • Cancer Mother    • Lung cancer Mother    • Heart disease Father    • Heart disease Brother    • Breast cancer Maternal Grandmother    • Colon cancer Neg Hx    • Colon polyps Neg Hx      Past Surgical History:   Procedure Laterality Date   • BREAST LUMPECTOMY     • CATARACT EXTRACTION     • CATARACT EXTRACTION W/ INTRAOCULAR LENS IMPLANTW/ TRABECULECTOMY     • COLONOSCOPY      limited due to scar tissue more thatn 10 years ago   • ENDOSCOPY     • GASTROSTOMY FEEDING TUBE INSERTION  2018    [facility]   • HIP ARTHROSCOPY Right     total hip replacement    • HYSTERECTOMY     • LUNG SURGERY     • RETINAL DETACHMENT REPAIR     • SPLENECTOMY     • THYROIDECTOMY  2012    partial   • UPPER GASTROINTESTINAL ENDOSCOPY  2018 x 3    esophageal strictures by [facility] " "    ROS: Pertinent items are noted in HPI, all other systems reviewed and negative     Objective     height is 165 cm (64.96\") and weight is 57.2 kg (126 lb). Her oral temperature is 97.5 °F (36.4 °C). Her blood pressure is 95/66 and her pulse is 80. Her respiration is 20 and oxygen saturation is 94%.    Intake/Output Summary (Last 24 hours) at 3/19/2020 0949  Last data filed at 3/18/2020 2024  Gross per 24 hour   Intake 1000 ml   Output --   Net 1000 ml         Physical Exam:  General Appearance:    Alert, cooperative, in no acute distress, thin.   Head:    Normocephalic, without obvious abnormality, atraumatic   Eyes:            Lids and lashes normal, conjunctivae and sclerae normal, no   icterus, no pallor, corneas clear, PERRLA   Ears:    Ears appear intact with no abnormalities noted   Throat:   No oral lesions, no thrush, oral mucosa moist   Neck:   No adenopathy, supple, trachea midline, no thyromegaly, no     carotid bruit, no JVD   Back:     No kyphosis present, no scoliosis present, no skin lesions,       erythema or scars, no tenderness to percussion or                   palpation,   range of motion normal   Lungs:     Clear to auscultation,respirations regular, even and                   Unlabored.  Decreased breath sounds at bases.    Heart:    Regular rhythm and normal rate, frequent ectopy, normal S1 and S2, no murmur, no gallop, no rub, no click       Abdomen:     Normal bowel sounds, no masses, no organomegaly, soft        non-tender, non-distended, no guarding, no rebound                 tenderness       Extremities:   Moves all extremities well,  no cyanosis, no redness, no edema   Pulses:   Pulses palpable and equal bilaterally   Skin:   No bleeding, bruising or rash   Lymph nodes:   No palpable adenopathy   Neurologic:   Cranial nerves 2 - 12 grossly intact, sensation intact, DTR        present and equal bilaterally          Results Review:  I personally reviewed the patient's clinical " results.  Results from last 7 days   Lab Units 20  0836   WBC 10*3/mm3 14.56*   HEMOGLOBIN g/dL 9.7*   HEMATOCRIT % 31.7*   PLATELETS 10*3/mm3 538*     Results from last 7 days   Lab Units 20  0836   SODIUM mmol/L 139   POTASSIUM mmol/L 3.5   CHLORIDE mmol/L 100   CO2 mmol/L 21.0*   BUN mg/dL 18   CREATININE mg/dL 0.59   CALCIUM mg/dL 8.3*   BILIRUBIN mg/dL 0.3   ALK PHOS U/L 103   ALT (SGPT) U/L 9   AST (SGOT) U/L 15   GLUCOSE mg/dL 174*         Results from last 7 days   Lab Units 20  0836   TSH uIU/mL 0.036*         Results from last 7 days   Lab Units 20  1632   PROBNP pg/mL 2,089.0*       Troponin:   < 0.01    Lactate:  4.6 --> 2.3    M.4      Radiology:  Imaging Results (Last 72 Hours)     Procedure Component Value Units Date/Time    CT Chest Without Contrast [[id number]] Collected:  20     Updated:  20    Narrative:       CT Chest WO    INDICATION:   69-year-old female with shortness of breath one week. COPD and lung cancer.    TECHNIQUE:   CT of the thorax without IV contrast. Coronal and sagittal reconstructions were obtained.  Radiation dose reduction techniques included automated exposure control or exposure modulation based on body size. Count of known CT and cardiac nuc med studies  performed in previous 12 months: 1.     COMPARISON:   2020    FINDINGS:  Lungs demonstrates sequela of COPD and there are postoperative changes related to lower lobectomy on the right. Trace amount of pleural fluid bilaterally right greater than left, new compared to the prior study with some probable bibasilar atelectasis. A  6 mm noncalcified nodule in the subpleural left lower lobe is unchanged. A subcentimeter noncalcified nodule in the right upper lobe is also stable. There is linear fibrotic change in the right upper lobe peripherally. No new dense consolidation  identified. Fibrotic changes in the lung apices right more prominent than left are stable and may  reflect sequela of radiation therapy given imaging features. Central airways are patent. No pneumothorax.    There is a multinodular right thyroid lobe that is unchanged. The heart is enlarged and there is atherosclerotic change of the aorta. A right pretracheal lymph node measures 7 mm short axis, slightly larger than on the prior study when it measured 4 mm.  A precarinal lymph node now measures 7 mm previously only about 3 mm. There is a prominent subcarinal lymph node stable to larger compared to the prior study as well. There is reflux in the esophagus. There is a new at least moderate to moderately large  pericardial effusion measuring up to 3.1 cm.    Included upper abdomen demonstrates a splenule in the left upper quadrant and postoperative changes of probable splenectomy. Visualized left kidney is atrophic. No suspicious bone lesion.      Impression:         1. Background emphysematous changes with new trace pleural effusions and probable bibasilar atelectasis rather than faint bibasilar pneumonia.  2. New moderate to moderately large pericardial effusion measuring up to 3.1 cm maximum thickness.  3. Probable sequela of radiation therapy in the lung apices and right lower lobectomy. Indeterminate noncalcified nodules in the right upper lobe and left lower lobe are unchanged.  4. Stable to slight interval enlargement of mediastinal lymph nodes, nonspecific and should be correlated clinically given the history of malignancy.  5. Gastroesophageal reflux.    Signer Name: Mario Alberto Heller MD   Signed: 3/18/2020 9:12 PM   Workstation Name: [id number]    [facility]    XR Chest 1 View [[id number]] Collected:  03/18/20 2007     Updated:  03/18/20 2010    Narrative:       CR Chest 1 Vw    INDICATION:   69-year-old female with shortness of breath 2 weeks. Worsening symptoms of past 4 days. Recent pneumonia COPD. Partial pneumonectomy on the right.     COMPARISON:    11/6/2018 and CT chest  2/6/2020    FINDINGS:  Single portable AP view(s) of the chest.  Metallic clips project over the left hemidiaphragm. Cardiac silhouette borderline enlarged and stable. The lungs demonstrate sequela of COPD with multifocal scarring and fibrotic change. Persistent area of less  prominent fibrosis in the midlung zone on the right along with curvilinear calcifications in the right lung apex. Chronic right-sided volume loss and probable pleural reaction accounting for blunting of the right CP angle. There is new left basilar  atelectasis or less likely faint infiltrate. No pneumothorax.      Impression:         1. Left basilar atelectasis. Faint infiltrate. Chronic COPD and postoperative changes on the right. No pneumothorax.    Signer Name: Mario Alberto Heller MD   Signed: 3/18/2020 8:07 PM   Workstation Name: [email or web address]    [facility]            Tele:  Sinus Arrhythmia    Assessment/Plan     1.  Paroxysmal Arrhythmias   -  68 yo CF with multiple medical problems presents with worsening dyspnea despite medical tx.  Presents to ED in NSR/ bpm with conversion to an SVT.  Rates controlled with IV Cardizem, now converted to AFib.   -  At time of consultation, Rates mid 80s .  Will obtain repeat EKG.   -  Also noted in setting of hypomagnesemia.  Replace per protocol and follow daily labs.   -  Echo shows normal EF, mild MR/mod-sev TR, + Pericardial effusion without evidence of tamponade.   -  CHADS2 Vasc = 3.  First episode documented AFib/arrhythmias in setting of recent PEG removal/sepsis/COPD/Hypomagnesemia.  If we initiate anti coagulation, will discuss post operatively with Dr. Mcintosh.   -  Will change Metoprolol Tartrate to BID dosing.  Monitor HR/BP.   -  Repeat EKG daily x 4 days.    2.  COPD/Asthma   -  68 yo CF with former tobacco abuse/COPD with known stable lung nodules on CT Chest.     -  Recently followed with Dr. Gage Collier as outpatient with addition of nebs/flonase.    3.   Esophageal stricture/type II acalasia   -  Previously followed by [facility]   -  Previous PEG that had not been used in years, s/p removal Dr. Mcintosh   -  Reports leaking fluid from around site of removal with PO intake of food/liquid.   -  S/P recent dilation 3/11/2020.   -  GI has been consulted and plans to EGD and closure/plug at PEG site today.      4.  Essential Hypertension   -  Well controlled   -  No change in BP with elevated HR/arrhythmia    5.  Sepsis   -  Presents with leukocytosis, tachycardia, lactic acidosis   -  Broad spectrum abx have been initiated.  IV Steroids.  Nebs.    6.  Pericardial Effusion   -  Noted on CT Chest.  Also noted on echo without evidence of cardiac tamponade   -  Continue to monitor/follow.    Acceptable cardiac risk for EGD.  Continue prn IV diltiazem    I discussed the patients findings and my recommendations with the patient, any present family members, and the nursing staff.  Bassam Leroy MD saw and examined patient, verified hx and PE, read all radiographic studies, reviewed labs and micro data, and formulated dx, plan for treatment and all medical decision making.      Janet Zavala PA-C, working with Bassam Leroy MD  03/19/20 09:49  Electronically signed by BARRY Mir, 03/19/20, 10:45 AM.    Inehal md, personally performed the services described in this documentation as scribed by the above named individual in my presence, and it is both accurate and complete.  3/19/2020  11:35

## 2020-03-19 NOTE — CONSULTS
GASTROENTEROLOGY CONSULT NOTE  Ita Pickett  [id number]  1950    CARE TEAM  Patient Care Team:  Lucho Peters MD as PCP - General (Family Medicine)  Anatoly Jay MD as PCP - Claims Attributed  Lucho Peters MD as Consulting Physician (Family Medicine)  Anatoly Jay MD as Referring Physician (General Surgery)  Nacho Hanna MD as Consulting Physician (Radiation Oncology)  Erik Pelletier MD as Consulting Physician (Gastroenterology)  Gage Collier MD as Consulting Physician (Pulmonary Disease)    No ref. provider found     Chief Complaint   Patient presents with   • Shortness of Breath        HISTORY OF PRESENT ILLNESS:  Patient known to me presents with gastrocutaneous fistula.    She is status post EGD with esophageal dilation and incidental PEG tube removal at that time.  PEG tube has been present for a long time, years, and she wanted it removed as she was no longer using it.  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    She was admitted with paroxysmal atrial fibrillation has been seen by cardiology.    She has a history of hypertension, Hodgkin's lymphoma status post x-ray therapy to chest neck and splenectomy 1980s.  Also history ovarian cancer status post total abdominal hysterectomy and radiation therapy and 74.  Squamous lung cancer with right lower lobe resection 2000.  She also has COPD, benign esophageal strictures with good response to esophageal dilations and also type II achalasia.    PAST MEDICAL HISTORY  Past Medical History:   Diagnosis Date   • Breast cancer (CMS/HCC)    • Cancer (CMS/HCC)     lung   • COPD (chronic obstructive pulmonary disease) (CMS/HCC)    • Disease of thyroid gland    • Dysrhythmia     tachycardia   • Frequent PVCs     bigeminal pvc   • Hodgkin's disease (CMS/HCC)    •  Hypertension    • Lung cancer (CMS/HCC)    • Ovarian cancer (CMS/HCC)         PAST SURGICAL HISTORY  Past Surgical History:   Procedure Laterality Date   • BREAST LUMPECTOMY     • CATARACT EXTRACTION     • CATARACT EXTRACTION W/ INTRAOCULAR LENS IMPLANTW/ TRABECULECTOMY     • COLONOSCOPY      limited due to scar tissue more thatn 10 years ago   • ENDOSCOPY     • GASTROSTOMY FEEDING TUBE INSERTION  2018    [facility]   • HIP ARTHROSCOPY Right 2011    total hip replacement    • HYSTERECTOMY     • LUNG SURGERY  2000   • RETINAL DETACHMENT REPAIR  2015   • SPLENECTOMY  1980   • THYROIDECTOMY  2012    partial   • UPPER GASTROINTESTINAL ENDOSCOPY  2018 x 3    esophageal strictures by [facility]        MEDICATIONS:    Current Facility-Administered Medications:   •  [START ON 3/20/2020] ! Vanc trough due 11:30 AM 3/20 - HOLD 12 noon vanc dose until trough reviewed by Pharmacist, , Does not apply, Once, Marcia Laguerre MD  •  acetaminophen (TYLENOL) tablet 650 mg, 650 mg, Oral, Q6H PRN, David Byrne DO  •  budesonide (PULMICORT) nebulizer solution 0.5 mg, 0.5 mg, Nebulization, Daily - RT, Maria Victoria Fajardo MD, 0.5 mg at 03/19/20 0755  •  diazePAM (VALIUM) tablet 5 mg, 5 mg, Oral, Q12H PRN, Maria Victoria Fajardo MD  •  dilTIAZem (CARDIZEM) 125 mg in 125 mL 0.7% sodium chloride (1 mg/mL) infusion, 5-15 mg/hr, Intravenous, Titrated, Maria Victoria Fajardo MD, Stopped at 03/19/20 0918  •  doxycycline (VIBRAMYCIN) 100 mg/100 mL 0.9% NS MBP, 100 mg, Intravenous, Q12H, Maria Victoria Fajardo MD  •  enoxaparin (LOVENOX) syringe 40 mg, 40 mg, Subcutaneous, Q24H, Maria Victoria Fajardo MD, 40 mg at 03/19/20 0659  •  fluticasone (FLONASE) 50 MCG/ACT nasal spray 2 spray, 2 spray, Nasal, BID, Maria Victoria Fajardo MD, 2 spray at 03/19/20 0834  •  HYDROcodone-acetaminophen (NORCO) 5-325 MG per tablet 1 tablet, 1 tablet, Oral, Q6H PRN, David Byrne DO, 1 tablet at 03/19/20 0944  •  ipratropium-albuterol (DUO-NEB) nebulizer solution  3 mL, 3 mL, Nebulization, Q4H PRN, Maria Victoria Fajardo MD  •  Magnesium Sulfate 2 gram Bolus, followed by 8 gram infusion (total Mg dose 10 grams)- Mg less than or equal to 1mg/dL, 2 g, Intravenous, PRN **OR** Magnesium Sulfate 2 gram / 50mL Infusion (GIVE X 3 BAGS TO EQUAL 6GM TOTAL DOSE) - Mg 1.1 - 1.5 mg/dl, 2 g, Intravenous, PRN, Last Rate: 25 mL/hr at 03/19/20 0323, 2 g at 03/19/20 0323 **OR** Magnesium Sulfate 4 gram infusion- Mg 1.6-1.9 mg/dL, 4 g, Intravenous, PRN, Shereen Renner, DO  •  methylPREDNISolone sodium succinate (SOLU-Medrol) injection 40 mg, 40 mg, Intravenous, Q12H, David Byrne DO  •  metoprolol tartrate (LOPRESSOR) tablet 50 mg, 50 mg, Oral, Q12H, Janet Zavala PA  •  pantoprazole (PROTONIX) EC tablet 40 mg, 40 mg, Oral, Q AM, Maria Victoria Fajardo MD, 40 mg at 03/19/20 0659  •  Pharmacy to dose vancomycin, , Does not apply, Continuous PRN, Maria Victoria Fajardo MD  •  piperacillin-tazobactam (ZOSYN) 3.375 g in iso-osmotic dextrose 50 ml (premix), 3.375 g, Intravenous, Q8H, Maria Victoria Fajardo MD, 3.375 g at 03/19/20 0659  •  sodium chloride 0.9 % flush 10 mL, 10 mL, Intravenous, PRN, Maria Victoria Fajardo MD  •  sodium chloride 0.9 % flush 10 mL, 10 mL, Intravenous, Q12H, Maria Victoria Fajardo MD  •  sodium chloride 0.9 % flush 10 mL, 10 mL, Intravenous, PRN, Maria Victoria Fajardo MD  •  sodium chloride 0.9 % infusion, 75 mL/hr, Intravenous, Continuous, Maria Victoria Fajardo MD, Last Rate: 75 mL/hr at 03/18/20 2340, 75 mL/hr at 03/18/20 2340  •  sodium chloride 0.9 % infusion, 125 mL/hr, Intravenous, Continuous, David Byrne, DO, Last Rate: 125 mL/hr at 03/19/20 0836, 125 mL/hr at 03/19/20 0836  •  traZODone (DESYREL) tablet 100 mg, 100 mg, Oral, Nightly PRN, Maria Victoria Fajardo MD  •  vancomycin 750 mg/250 mL 0.9% NS IVPB ([facility]), 15 mg/kg, Intravenous, Q18H, Marcia Laguerre MD    ALLERGIES  No Known Allergies    FAMILY HISTORY:  Family History   Problem Relation Age of Onset   • Cancer Mother    • Lung  "cancer Mother    • Heart disease Father    • Heart disease Brother    • Breast cancer Maternal Grandmother    • Colon cancer Neg Hx    • Colon polyps Neg Hx        SOCIAL HISTORY  Social History     Socioeconomic History   • Marital status:      Spouse name: Not on file   • Number of children: Not on file   • Years of education: Not on file   • Highest education level: Not on file   Tobacco Use   • Smoking status: Former Smoker     Packs/day: 3.00     Years: 40.00     Pack years: 120.00     Types: Cigarettes     Last attempt to quit: 5/10/2010     Years since quittin.8   • Smokeless tobacco: Never Used   Substance and Sexual Activity   • Alcohol use: Yes     Alcohol/week: 2.0 standard drinks     Types: 2 Shots of liquor per week     Comment: 2 drinks per day   • Drug use: Yes     Types: Marijuana     Comment: brownie   • Sexual activity: Defer     Socioeconomic History:  She is a .  No children.  Non-smoker.  Drinks alcoholic beverages rarely.  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       REVIEW OF SYSTEMS  Review of Systems   Endocrine musculoskeletal and skin review of systems are unchanged from my previous note.  Cardiovascular pulmonary neurological generalized GI urinary tract genital    PHYSICAL EXAM   BP 94/74   Pulse 79   Temp 97.5 °F (36.4 °C) (Oral)   Resp 20   Ht 165 cm (64.96\")   Wt 57.2 kg (126 lb)   SpO2 96%   Breastfeeding No   BMI 20.99 kg/m²   General: Alert and oriented x 3. In no apparent or acute distress.  and No stigmata of chronic liver disease  HEENT: Anicteric slcera. Normal oropharynx  Neck: Supple. Without lymphadenopathy  CV: Regular rate and rhythm, S1, S2  Lungs: Clear to ausculation. Without rales, robchi and wheezing  Abdomen: Erythema around the PEG tube site consistent with chemical dermatitis.  Abdomen is soft with normal bowel sounds no palpable masses.  Extremeties: without clubbing, cyanosis or edema  Neurologic:  Alert and oriented x 3 without focal motor " or sensory deficits  Rectal exam: deferred        Results Review:  I reviewed the patient's new clinical results.      ASSESSMENT  1.-  Gastrocutaneous fistula/failure of PEG tube site closure.  I gave patient the option of either placing a new PEG tube, simply continue to observe and see if the PEG tube site will close over time or endoscopically clipping this internally after argon plasma coagulation of the PEG tube site.  She wishes to proceed with attempted clip closure via endoscopy.  2.-  Type II achalasia.  Conservative management.  Poor candidate for Heller myotomy or POEM type intervention (per oral endoscopic myotomy)  3.-  Average risk for colon cancer.  4.-  History of lymphoma status post XRT and splenectomy  5.-  History of ovarian cancer status post hysterectomy and radiation therapy  6.-  History of squamous cell lung cancer status post right lower lobe resection    PLAN  1.-  EGD with argon plasma coagulation of the internal PEG site and subsequent clip placement for defect closure.  Patient understands risks, benefits, options and ample opportunity for questions been provided she is agreeable to proceeding  2.-  Anticoagulation therapy can be initiated as per cardiology today's interventions would not preclude initiation of anticoagulation.      I discussed the patients findings and my recommendations with patient    Felice Mcintosh MD  3/19/2020   10:59    Much of this note is an electronic transcription of spoken language to printed text. Electronic transcription of spoken language may permit erroneous, nonsensical word phrases to be inadvertently transcribed.  Although I have reviewed the note for these errors, some may still be present.

## 2020-03-19 NOTE — NURSING NOTE
WOC nurse consult for PEG tube site.  Per RN Iliana, Dr Mcintosh is address the patients peg tube site and has given her orders.  No WOC nurse need at this time reconsult if needed.  Thank you

## 2020-03-19 NOTE — OP NOTE
See EGD report    Old PEG tube site was cauterized with argon plasma coagulator and clip closure of defect was performed with Ovesco clip.  Incidental fundic gland polyp was noted and stricturing of the distal esophagus was also noted.    Recommendation  Observation  Repeat EGD in 2 to 3 months  Anticoagulate as needed

## 2020-03-19 NOTE — PROGRESS NOTES
Assuming care of Ms. Pickett after early AM admission. The current assessment and plan is as follows:    Sepsis from presumed PNA  Acute exacerbation of COPD  - added nasal swab for MRSA today, negative, stop vanc  - not wheezing substantially today, dec frequency of IV steroids  - follow micro data, viral panel negative  - cont nebs  - CT really not convincing for PNA, however does have leukocytosis and elevated procal, will cont zosyn and doxycycline currently, plan short course (5 days) given more likely related to tachyarrhythmias    Supraventricular tachyarrhythmias (SVT, Afib)  Hypomagnesemia  - SVT on arrival, now rate controlled Afib this AM  - rate contolled on dilt drip and started on oral lopressor, titrate off ggt as tolerated  - replace mag, cont telemetry  - cardiology to see today  - dyspnea can be partially due to her HR    Pericardial effusion  - large effusion on imaging, stat echo overnight with moderate effusion and no tamponade; IVF noted >50% collapsible, will add additional IVF's today as she is likely volume deplete    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 Mcintosh about a week ago, has been draining gastric contents now with circumferential derm changes locally (zosyn/doxy will cover plent for SSTI regardless)  - consult GI today for recommendations vs repair    Multiple primary malignancies (breast, lung, ovarian, Hodgkin's)

## 2020-03-19 NOTE — PROGRESS NOTES
[facility] Medicine Services  PROGRESS NOTE    Patient Name: Ita Pickett  : 1950  MRN: [id number]    Date of Admission: 3/18/2020  Primary Care Physician: Lucho Peters MD    Subjective   Subjective     CC:  Short of breath    HPI:  Still some SOB but better with rate control. No chest pain. Has been having drainage from her PEG since it was removed. Has not had fevers, sputum production    Review of Systems  Gen- No fevers, chills  CV- No chest pain, palpitations  Resp- Yes cough, dyspnea  GI- No N/V/D, abd pain    Objective   Objective     Vital Signs:   Temp:  [96.3 °F (35.7 °C)-98.7 °F (37.1 °C)] 98.2 °F (36.8 °C)  Heart Rate:  [] 76  Resp:  [15-24] 16  BP: ()/(62-96) 116/79        Physical Exam:  Constitutional: No acute distress, awake, alert, sitting up in bed  HENT: NCAT, mucous membranes moist  Respiratory: Clear to auscultation bilaterally, tachypneic, cannot speak full sentences  Cardiovascular: IRIR, regular rage, no murmurs, rubs, or gallops, palpable radial pulses bilaterally  Gastrointestinal: Positive bowel sounds, soft, nontender, nondistended  Musculoskeletal: No bilateral ankle edema  Psychiatric: Appropriate affect, cooperative    Results Reviewed:  Results from last 7 days   Lab Units 20  0836 20  2106 20  1632   WBC 10*3/mm3 14.56*  --  16.50*   HEMOGLOBIN g/dL 9.7*  --  10.3*   HEMATOCRIT % 31.7*  --  33.0*   PLATELETS 10*3/mm3 538*  --  579*   PROCALCITONIN ng/mL  --  0.37*  --      Results from last 7 days   Lab Units 20  0836 20  1632   SODIUM mmol/L 139 137   POTASSIUM mmol/L 3.5 4.5   CHLORIDE mmol/L 100 91*   CO2 mmol/L 21.0* 30.0*   BUN mg/dL 18 21   CREATININE mg/dL 0.59 0.87   GLUCOSE mg/dL 174* 144*   CALCIUM mg/dL 8.3* 9.3   ALT (SGPT) U/L 9 9   AST (SGOT) U/L 15 22   TROPONIN T ng/mL  --  <0.010   PROBNP pg/mL  --  2,089.0*     Estimated Creatinine Clearance: 59.1 mL/min (by C-G formula  based on SCr of 0.59 mg/dL).    Microbiology Results Abnormal     Procedure Component Value - Date/Time    MRSA Screen, PCR - Swab, Nares [[id number]]  (Normal) Collected:  03/19/20 1136    Lab Status:  Final result Specimen:  Swab from Nares Updated:  03/19/20 1311     MRSA PCR Negative    Narrative:       MRSA Negative    Wound Culture - Wound, Stomach [[id number]] Collected:  03/19/20 0323    Lab Status:  Preliminary result Specimen:  Wound from Stomach Updated:  03/19/20 0602     Gram Stain No WBCs or organisms seen    Respiratory Panel, PCR - Swab, Nasopharynx [[id number]]  (Normal) Collected:  03/18/20 2129    Lab Status:  Final result Specimen:  Swab from Nasopharynx Updated:  03/19/20 0100     ADENOVIRUS, PCR Not Detected     Coronavirus 229E Not Detected     Coronavirus HKU1 Not Detected     Coronavirus NL63 Not Detected     Coronavirus OC43 Not Detected     Human Metapneumovirus Not Detected     Human Rhinovirus/Enterovirus Not Detected     Influenza B PCR Not Detected     Parainfluenza Virus 1 Not Detected     Parainfluenza Virus 2 Not Detected     Parainfluenza Virus 3 Not Detected     Parainfluenza Virus 4 Not Detected     Bordetella pertussis pcr Not Detected     Influenza A H1 2009 PCR Not Detected     Chlamydophila pneumoniae PCR Not Detected     Mycoplasma pneumo by PCR Not Detected     Influenza A PCR Not Detected     Influenza A H3 Not Detected     Influenza A H1 Not Detected     RSV, PCR Not Detected     Bordetella parapertussis PCR Not Detected    Narrative:       The coronavirus on the RVP is NOT COVID-19 and is NOT indicative of infection with COVID-19.           Imaging Results (Last 24 Hours)     Procedure Component Value Units Date/Time    CT Chest Without Contrast [[id number]] Collected:  03/18/20 2112     Updated:  03/18/20 2114    Narrative:       CT Chest WO    INDICATION:   69-year-old female with shortness of breath one week. COPD and lung cancer.    TECHNIQUE:   CT of the thorax without  IV contrast. Coronal and sagittal reconstructions were obtained.  Radiation dose reduction techniques included automated exposure control or exposure modulation based on body size. Count of known CT and cardiac nuc med studies  performed in previous 12 months: 1.     COMPARISON:   2/26/2020    FINDINGS:  Lungs demonstrates sequela of COPD and there are postoperative changes related to lower lobectomy on the right. Trace amount of pleural fluid bilaterally right greater than left, new compared to the prior study with some probable bibasilar atelectasis. A  6 mm noncalcified nodule in the subpleural left lower lobe is unchanged. A subcentimeter noncalcified nodule in the right upper lobe is also stable. There is linear fibrotic change in the right upper lobe peripherally. No new dense consolidation  identified. Fibrotic changes in the lung apices right more prominent than left are stable and may reflect sequela of radiation therapy given imaging features. Central airways are patent. No pneumothorax.    There is a multinodular right thyroid lobe that is unchanged. The heart is enlarged and there is atherosclerotic change of the aorta. A right pretracheal lymph node measures 7 mm short axis, slightly larger than on the prior study when it measured 4 mm.  A precarinal lymph node now measures 7 mm previously only about 3 mm. There is a prominent subcarinal lymph node stable to larger compared to the prior study as well. There is reflux in the esophagus. There is a new at least moderate to moderately large  pericardial effusion measuring up to 3.1 cm.    Included upper abdomen demonstrates a splenule in the left upper quadrant and postoperative changes of probable splenectomy. Visualized left kidney is atrophic. No suspicious bone lesion.      Impression:         1. Background emphysematous changes with new trace pleural effusions and probable bibasilar atelectasis rather than faint bibasilar pneumonia.  2. New moderate to  moderately large pericardial effusion measuring up to 3.1 cm maximum thickness.  3. Probable sequela of radiation therapy in the lung apices and right lower lobectomy. Indeterminate noncalcified nodules in the right upper lobe and left lower lobe are unchanged.  4. Stable to slight interval enlargement of mediastinal lymph nodes, nonspecific and should be correlated clinically given the history of malignancy.  5. Gastroesophageal reflux.    Signer Name: Mario Alberto Heller MD   Signed: 3/18/2020 9:12 PM   Workstation Name: [facility]    Radiology [facility]    XR Chest 1 View [[id number]] Collected:  03/18/20 2007     Updated:  03/18/20 2010    Narrative:       CR Chest 1 Vw    INDICATION:   69-year-old female with shortness of breath 2 weeks. Worsening symptoms of past 4 days. Recent pneumonia COPD. Partial pneumonectomy on the right.     COMPARISON:    11/6/2018 and CT chest 2/6/2020    FINDINGS:  Single portable AP view(s) of the chest.  Metallic clips project over the left hemidiaphragm. Cardiac silhouette borderline enlarged and stable. The lungs demonstrate sequela of COPD with multifocal scarring and fibrotic change. Persistent area of less  prominent fibrosis in the midlung zone on the right along with curvilinear calcifications in the right lung apex. Chronic right-sided volume loss and probable pleural reaction accounting for blunting of the right CP angle. There is new left basilar  atelectasis or less likely faint infiltrate. No pneumothorax.      Impression:         1. Left basilar atelectasis. Faint infiltrate. Chronic COPD and postoperative changes on the right. No pneumothorax.    Signer Name: Mario Alberto Heller MD   Signed: 3/18/2020 8:07 PM   Workstation Name: [facility]    Radiology [facility]          Results for orders placed during the hospital encounter of 03/18/20   STAT Adult Transthoracic Echo Complete W/ Cont if Necessary Per Protocol    Narrative · Left ventricular  systolic function is normal. Estimated EF = 65%.  · Left ventricular wall thickness is consistent with mild concentric   hypertrophy.  · There is calcification of the aortic valve.  · There is at least moderate aortic valve regurgitation is present.   Quantification is complicated by the presence of atrial fibrillation.  · Mild mitral valve regurgitation is present  · Moderate to severe tricuspid valve regurgitation is present.  · Estimated right ventricular systolic pressure from tricuspid   regurgitation is moderately elevated (45-55 mmHg).  · There is a moderate circumferential pericardial effusion with a maximal   diameter of 2.3 cm that is predominantly located adjacent to the right   ventricle. There was no evidence of cardiac tamponade. The right ventricle   did not demonstrate diastolic collapse. The respiratory variation was 17%   across the mitral valve and 29% across the tricuspid valve.          I have reviewed the medications:  Scheduled Meds:  budesonide 0.5 mg Nebulization Daily - RT   doxycycline 100 mg Intravenous Q12H   enoxaparin 40 mg Subcutaneous Q24H   fluticasone 2 spray Nasal BID   methylPREDNISolone sodium succinate 40 mg Intravenous Q12H   metoprolol tartrate 50 mg Oral Q12H   pantoprazole 40 mg Oral Q AM   piperacillin-tazobactam 3.375 g Intravenous Q8H   sodium chloride 10 mL Intravenous Q12H     Continuous Infusions:  dilTIAZem 5-15 mg/hr Last Rate: Stopped (03/19/20 0918)   lactated ringers 20 mL/hr Last Rate: 20 mL/hr (03/19/20 1348)   sodium chloride 75 mL/hr Last Rate: 75 mL/hr (03/18/20 2340)     PRN Meds:.•  acetaminophen  •  diazePAM  •  HYDROcodone-acetaminophen  •  ipratropium-albuterol  •  magnesium sulfate **OR** magnesium sulfate **OR** magnesium sulfate  •  potassium chloride  •  potassium chloride  •  sodium chloride  •  sodium chloride  •  traZODone    Assessment/Plan   Assessment & Plan     Active Hospital Problems    Diagnosis  POA   • **Pain around PEG tube site  [T85.848A]  Unknown   • Dyspnea [R06.00]  Yes   • SVT (supraventricular tachycardia) (CMS/[facility]) [I47.1]  Unknown   • Pericardial effusion [I31.3]  Unknown   • Sepsis, unspecified organism (CMS/[facility]) [A41.9]  Unknown   • Lactic acidosis [E87.2]  Unknown   • Hodgkin lymphoma (CMS/[facility]) [C81.90]  Yes   • Breast cancer (CMS/[facility]) [C50.919]  Yes   • History of lung cancer [Z85.118]  Not Applicable   • COPD (chronic obstructive pulmonary disease) (CMS/[facility]) [J44.9]  Yes      Resolved Hospital Problems   No resolved problems to display.        Brief Hospital Course to date:  Ita Pickett is a 69 y.o. female with COPD, chronic PEG removed approx 1 week ago with drainage since that time, and respiratory issues waxing and waning since January who presented with worse SOB found to have tachyarrhythmia at 200bpm and questionable LLL infiltrate on imaging with elevated procal and WBC    The following problems are new to me today    Assessment/Plan    Sepsis from presumed PNA  Acute exacerbation of COPD  - added nasal swab for MRSA today, negative, stop vanc  - not wheezing substantially today, dec frequency of IV steroids  - follow micro data, viral panel negative  - cont nebs  - CT really not convincing for PNA, however does have leukocytosis and elevated procal, will cont zosyn and doxycycline currently, plan short course (5 days) given more likely related to tachyarrhythmias     Supraventricular tachyarrhythmias (SVT, Afib)  Hypomagnesemia  - SVT on arrival, now rate controlled Afib this AM  - rate contolled on dilt drip and started on oral lopressor, titrate off ggt as tolerated  - replace mag, cont telemetry  - cardiology to see today  - dyspnea can be partially due to her HR     Pericardial effusion  - large effusion on imaging, stat echo overnight with moderate effusion and no tamponade; IVF noted >50% collapsible, will add additional IVF's today as she is likely volume deplete     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 Mcintosh about a week ago, has been draining gastric contents now with circumferential derm changes locally (zosyn/doxy will cover plent for SSTI regardless)  - consult GI today for recommendations vs repair     Multiple primary malignancies (breast, lung, ovarian, Hodgkin's)     DVT Prophylaxis:  lovenox    Disposition: I expect the patient to be discharged TBD pending clinical course.    CODE STATUS:   Code Status and Medical Interventions:   Ordered at: 03/18/20 2140     Limited Support to NOT Include:    Vasopressors    Intubation    Cardioversion/Defibrillation     Code Status:    No CPR     Medical Interventions (Level of Support Prior to Arrest):    Limited     Comments:    d/w patient, she has a living will reflective of this         Electronically signed by David Byrne DO, 03/19/20, 16:43.

## 2020-03-19 NOTE — SIGNIFICANT NOTE
03/19/20 1027   SLP Deferred Reason   SLP Deferred Reason Unable to evaluate, medical status change  (Patient NPO for procedure. RN will notify with changes today. Will follow up tomorrow)

## 2020-03-19 NOTE — PROGRESS NOTES
Discharge Planning Assessment  [facility]     Patient Name: Ita Pickett  MRN: [id number]  Today's Date: 3/19/2020    Admit Date: 3/18/2020    Discharge Needs Assessment     Row Name 03/19/20 1201       Living Environment    Lives With  alone [facility]    Unique Family Situation  Has friends that stay with her at times    Current Living Arrangements  home/apartment/condo    Primary Care Provided by  self    Provides Primary Care For  no one    Family Caregiver if Needed  friend(s);sibling(s)    Family Caregiver Names  Friend- Gallito Jansen and sister- Delores Welsh    Quality of Family Relationships  helpful;involved;supportive    Able to Return to Prior Arrangements  yes       Resource/Environmental Concerns    Resource/Environmental Concerns  none       Transition Planning    Patient/Family Anticipates Transition to  home;home with help/services    Patient/Family Anticipated Services at Transition  home health care    Transportation Anticipated  family or friend will provide       Discharge Needs Assessment    Readmission Within the Last 30 Days  no previous admission in last 30 days    Concerns to be Addressed  discharge planning    Equipment Currently Used at Home  oxygen home oxygen for HS use through [employer]    Anticipated Changes Related to Illness  none    Equipment Needed After Discharge  none    Outpatient/Agency/Support Group Needs  homecare agency    Discharge Facility/Level of Care Needs  home with home health    Provided Post Acute Provider List?  Refused    Refused Provider List Comment  Pt has used [facility] in the past and wants to use them again    Patient's Choice of Community Agency(s)  [facility]        Discharge Plan     Row Name 03/19/20 1203       Plan    Plan  home with home health services    Patient/Family in Agreement with Plan  yes    Plan Comments  CM spoke with pt at bedside. Pt resides alone in [facility] and has a friend Gallito that stays with her at  times and her sister Delores also assists as needed. Pt reports she is independent with adls and has home oxygen for nocturnal use through [facility]. Pt reports she is not current with home health services but has used [place] [facility] in the past. Pt reports she is agreeablt to home health services again and feels this could be beneficial to her at home if needed at time of discharge. CM offered list of agencies and pt declines as she would like to use [facility] again. CM will follow and arrange home health at discharge if indicated.     Final Discharge Disposition Code  06 - home with home health care        Destination      Coordination has not been started for this encounter.      Durable Medical Equipment      Coordination has not been started for this encounter.      Dialysis/Infusion      Coordination has not been started for this encounter.      Home Medical Care      Service Provider Request Status Selected Services Address Phone Number Fax Number    [facility] Pending - No Request Sent N/A [address] [facility] [address] [phone]      Therapy      Coordination has not been started for this encounter.      Community Resources      Coordination has not been started for this encounter.          Demographic Summary     Row Name 03/19/20 1149       General Information    Referral Source  admission list    Reason for Consult  discharge planning    Preferred Language  English    General Information Comments  PCP- Dr. Gerson Peters       Contact Information    Permission Granted to Share Info With      Contact Information Comments  [phone]        Functional Status     Row Name 03/19/20 1159       Functional Status    Usual Activity Tolerance  moderate    Current Activity Tolerance  fair       Functional Status, IADL    Medications  independent    Meal Preparation  independent    Housekeeping  independent    Laundry  independent    Shopping   independent       Employment/    Employment/ Comments  Pt confirms she has Medicare and [employer] [place] supplement. Pt denies concerns or disruption in coverage. Pt has prescription drug coverage and denies issues obtaining or affording current medications.         Psychosocial    No documentation.       Abuse/Neglect    No documentation.       Legal    No documentation.       Substance Abuse    No documentation.       Patient Forms    No documentation.           Nereida Winslow RN

## 2020-03-19 NOTE — PLAN OF CARE
Problem: Patient Care Overview  Goal: Plan of Care Review  Flowsheets  Taken 3/19/2020 0250  Progress: no change  Outcome Summary: Pt admitted to floor from ED. Pt tachycardic. Peg tube site is red and irritated, leaking yellow/green drainage. Mag of 1.4, being replaced. Room air. Fall precautions maintained. Will continue to monitor.  Taken 3/19/2020 0051  Plan of Care Reviewed With: patient

## 2020-03-19 NOTE — ANESTHESIA PREPROCEDURE EVALUATION
Anesthesia Evaluation     NPO Solid Status: > 8 hours  NPO Liquid Status: > 6 hours           Airway   Mallampati: II  TM distance: >3 FB  No difficulty expected  Dental      Pulmonary    (+) a smoker (quit in 2010prior right lower lobectomy for lung cancer 2010) Former, lung cancer, decreased breath sounds,   (-) asthma  Cardiovascular     (+) hypertension, valvular problems/murmurs AI, MR and TI, dysrhythmias (SVT, A FIB) Atrial Fib,   (-) pacemaker, angina, cardiac stents      Neuro/Psych  (-) seizures, TIA, CVA  GI/Hepatic/Renal/Endo    (-) liver disease, no renal disease, diabetes    Musculoskeletal     Abdominal    Substance History      OB/GYN          Other        ROS/Med Hx Other: Lung Cancer, 4 primary breast cancers  Home O2  H.O SVT, a Fib  RLL lobectomy 2010  Feeding tube  HTN  Tob 2010  MOD AR, mild MR,  MO_SEVERE TR  RVSP 45-55  New pericardial effusion 2.3 cm without diastolic collapse        Phys Exam Other: Decreased breath sounds on the right especially  Patient SOB              Anesthesia Plan    ASA 4     MAC     intravenous induction     Anesthetic plan, all risks, benefits, and alternatives have been provided, discussed and informed consent has been obtained with: patient.    Plan discussed with CRNA.

## 2020-03-19 NOTE — ANESTHESIA POSTPROCEDURE EVALUATION
Patient: Ita Pickett    Procedure Summary     Date:  03/19/20 Room / Location:   [facility]    Anesthesia Start:  1435 Anesthesia Stop:      Procedure:  ESOPHAGOGASTRODUODENOSCOPY WITH ARGON PLASMA COAGULATOR (N/A ) Diagnosis:       Pain around percutaneous endoscopic gastrostomy (PEG) tube site, initial encounter      (Pain around percutaneous endoscopic gastrostomy (PEG) tube site, initial encounter [T85.849K])    Surgeon:  Felice Mcintosh MD Provider:  Cristobal Miller MD    Anesthesia Type:  MAC ASA Status:  4          Anesthesia Type: MAC    Vitals  Vitals Value Taken Time   BP     Temp     Pulse     Resp     SpO2 93 % 3/19/2020  3:18 PM   Vitals shown include unvalidated device data.        Post Anesthesia Care and Evaluation    Patient location during evaluation: PACU  Patient participation: complete - patient participated  Level of consciousness: awake  Pain score: 0  Pain management: adequate  Airway patency: patent  Anesthetic complications: No anesthetic complications  PONV Status: none  Cardiovascular status: acceptable and stable  Respiratory status: nasal cannula, unassisted, acceptable and spontaneous ventilation  Hydration status: acceptable

## 2020-03-20 ENCOUNTER — APPOINTMENT (OUTPATIENT)
Dept: GENERAL RADIOLOGY | Facility: HOSPITAL | Age: 70
End: 2020-03-20

## 2020-03-20 LAB
ANION GAP SERPL CALCULATED.3IONS-SCNC: 10 MMOL/L (ref 5–15)
BASOPHILS # BLD AUTO: 0.03 10*3/MM3 (ref 0–0.2)
BASOPHILS NFR BLD AUTO: 0.1 % (ref 0–1.5)
BUN BLD-MCNC: 24 MG/DL (ref 8–23)
BUN/CREAT SERPL: 38.1 (ref 7–25)
CALCIUM SPEC-SCNC: 8.4 MG/DL (ref 8.6–10.5)
CHLORIDE SERPL-SCNC: 107 MMOL/L (ref 98–107)
CO2 SERPL-SCNC: 25 MMOL/L (ref 22–29)
CREAT BLD-MCNC: 0.63 MG/DL (ref 0.57–1)
DEPRECATED RDW RBC AUTO: 60.1 FL (ref 37–54)
EOSINOPHIL # BLD AUTO: 0 10*3/MM3 (ref 0–0.4)
EOSINOPHIL NFR BLD AUTO: 0 % (ref 0.3–6.2)
ERYTHROCYTE [DISTWIDTH] IN BLOOD BY AUTOMATED COUNT: 17.5 % (ref 12.3–15.4)
GFR SERPL CREATININE-BSD FRML MDRD: 93 ML/MIN/1.73
GLUCOSE BLD-MCNC: 133 MG/DL (ref 65–99)
HCT VFR BLD AUTO: 29.8 % (ref 34–46.6)
HGB BLD-MCNC: 8.9 G/DL (ref 12–15.9)
IMM GRANULOCYTES # BLD AUTO: 0.23 10*3/MM3 (ref 0–0.05)
IMM GRANULOCYTES NFR BLD AUTO: 0.9 % (ref 0–0.5)
LYMPHOCYTES # BLD AUTO: 0.67 10*3/MM3 (ref 0.7–3.1)
LYMPHOCYTES NFR BLD AUTO: 2.7 % (ref 19.6–45.3)
MAGNESIUM SERPL-MCNC: 2.2 MG/DL (ref 1.6–2.4)
MCH RBC QN AUTO: 27.9 PG (ref 26.6–33)
MCHC RBC AUTO-ENTMCNC: 29.9 G/DL (ref 31.5–35.7)
MCV RBC AUTO: 93.4 FL (ref 79–97)
MONOCYTES # BLD AUTO: 1.22 10*3/MM3 (ref 0.1–0.9)
MONOCYTES NFR BLD AUTO: 5 % (ref 5–12)
NEUTROPHILS # BLD AUTO: 22.36 10*3/MM3 (ref 1.7–7)
NEUTROPHILS NFR BLD AUTO: 91.3 % (ref 42.7–76)
NRBC BLD AUTO-RTO: 0.4 /100 WBC (ref 0–0.2)
PLATELET # BLD AUTO: 573 10*3/MM3 (ref 140–450)
PMV BLD AUTO: 10 FL (ref 6–12)
POTASSIUM BLD-SCNC: 3.9 MMOL/L (ref 3.5–5.2)
RBC # BLD AUTO: 3.19 10*6/MM3 (ref 3.77–5.28)
SODIUM BLD-SCNC: 142 MMOL/L (ref 136–145)
WBC NRBC COR # BLD: 24.51 10*3/MM3 (ref 3.4–10.8)

## 2020-03-20 PROCEDURE — 92610 EVALUATE SWALLOWING FUNCTION: CPT

## 2020-03-20 PROCEDURE — 25010000002 METHYLPREDNISOLONE PER 40 MG: Performed by: INTERNAL MEDICINE

## 2020-03-20 PROCEDURE — 83735 ASSAY OF MAGNESIUM: CPT | Performed by: INTERNAL MEDICINE

## 2020-03-20 PROCEDURE — 93010 ELECTROCARDIOGRAM REPORT: CPT | Performed by: INTERNAL MEDICINE

## 2020-03-20 PROCEDURE — 25010000002 PIPERACILLIN SOD-TAZOBACTAM PER 1 G: Performed by: INTERNAL MEDICINE

## 2020-03-20 PROCEDURE — 92611 MOTION FLUOROSCOPY/SWALLOW: CPT

## 2020-03-20 PROCEDURE — 80048 BASIC METABOLIC PNL TOTAL CA: CPT | Performed by: INTERNAL MEDICINE

## 2020-03-20 PROCEDURE — 93005 ELECTROCARDIOGRAM TRACING: CPT | Performed by: PHYSICIAN ASSISTANT

## 2020-03-20 PROCEDURE — 74230 X-RAY XM SWLNG FUNCJ C+: CPT

## 2020-03-20 PROCEDURE — 93005 ELECTROCARDIOGRAM TRACING: CPT | Performed by: INTERNAL MEDICINE

## 2020-03-20 PROCEDURE — 94799 UNLISTED PULMONARY SVC/PX: CPT

## 2020-03-20 PROCEDURE — 99231 SBSQ HOSP IP/OBS SF/LOW 25: CPT | Performed by: INTERNAL MEDICINE

## 2020-03-20 PROCEDURE — 85025 COMPLETE CBC W/AUTO DIFF WBC: CPT | Performed by: INTERNAL MEDICINE

## 2020-03-20 PROCEDURE — 99232 SBSQ HOSP IP/OBS MODERATE 35: CPT | Performed by: INTERNAL MEDICINE

## 2020-03-20 PROCEDURE — 92526 ORAL FUNCTION THERAPY: CPT

## 2020-03-20 PROCEDURE — 25010000002 ENOXAPARIN PER 10 MG: Performed by: INTERNAL MEDICINE

## 2020-03-20 RX ORDER — PREDNISONE 20 MG/1
40 TABLET ORAL
Status: DISCONTINUED | OUTPATIENT
Start: 2020-03-21 | End: 2020-03-21 | Stop reason: HOSPADM

## 2020-03-20 RX ORDER — NYSTATIN 100000 [USP'U]/G
POWDER TOPICAL EVERY 12 HOURS SCHEDULED
Status: DISCONTINUED | OUTPATIENT
Start: 2020-03-20 | End: 2020-03-21 | Stop reason: HOSPADM

## 2020-03-20 RX ORDER — LIDOCAINE 50 MG/G
1 PATCH TOPICAL
Status: DISCONTINUED | OUTPATIENT
Start: 2020-03-20 | End: 2020-03-21 | Stop reason: HOSPADM

## 2020-03-20 RX ADMIN — ACETAMINOPHEN 650 MG: 325 TABLET, FILM COATED ORAL at 04:42

## 2020-03-20 RX ADMIN — DOXYCYCLINE 100 MG: 100 INJECTION, POWDER, LYOPHILIZED, FOR SOLUTION INTRAVENOUS at 08:23

## 2020-03-20 RX ADMIN — BUDESONIDE 0.5 MG: 0.5 INHALANT RESPIRATORY (INHALATION) at 08:03

## 2020-03-20 RX ADMIN — BARIUM SULFATE 20 ML: 400 PASTE ORAL at 13:15

## 2020-03-20 RX ADMIN — FLUTICASONE PROPIONATE 2 SPRAY: 50 SPRAY, METERED NASAL at 08:17

## 2020-03-20 RX ADMIN — BARIUM SULFATE 55 ML: 0.81 POWDER, FOR SUSPENSION ORAL at 13:15

## 2020-03-20 RX ADMIN — ENOXAPARIN SODIUM 40 MG: 40 INJECTION SUBCUTANEOUS at 05:26

## 2020-03-20 RX ADMIN — TAZOBACTAM SODIUM AND PIPERACILLIN SODIUM 3.38 G: 375; 3 INJECTION, SOLUTION INTRAVENOUS at 05:26

## 2020-03-20 RX ADMIN — HYDROCODONE BITARTRATE AND ACETAMINOPHEN 1 TABLET: 5; 325 TABLET ORAL at 12:11

## 2020-03-20 RX ADMIN — SODIUM CHLORIDE, PRESERVATIVE FREE 10 ML: 5 INJECTION INTRAVENOUS at 21:22

## 2020-03-20 RX ADMIN — TAZOBACTAM SODIUM AND PIPERACILLIN SODIUM 3.38 G: 375; 3 INJECTION, SOLUTION INTRAVENOUS at 15:07

## 2020-03-20 RX ADMIN — IPRATROPIUM BROMIDE AND ALBUTEROL SULFATE 3 ML: 2.5; .5 SOLUTION RESPIRATORY (INHALATION) at 04:27

## 2020-03-20 RX ADMIN — METHYLPREDNISOLONE SODIUM SUCCINATE 40 MG: 40 INJECTION, POWDER, FOR SOLUTION INTRAMUSCULAR; INTRAVENOUS at 05:26

## 2020-03-20 RX ADMIN — HYDROCODONE BITARTRATE AND ACETAMINOPHEN 1 TABLET: 5; 325 TABLET ORAL at 21:31

## 2020-03-20 RX ADMIN — METHYLPREDNISOLONE SODIUM SUCCINATE 40 MG: 40 INJECTION, POWDER, FOR SOLUTION INTRAMUSCULAR; INTRAVENOUS at 18:43

## 2020-03-20 RX ADMIN — METOPROLOL TARTRATE 50 MG: 25 TABLET, FILM COATED ORAL at 21:21

## 2020-03-20 RX ADMIN — DOXYCYCLINE 100 MG: 100 INJECTION, POWDER, LYOPHILIZED, FOR SOLUTION INTRAVENOUS at 21:21

## 2020-03-20 RX ADMIN — TAZOBACTAM SODIUM AND PIPERACILLIN SODIUM 3.38 G: 375; 3 INJECTION, SOLUTION INTRAVENOUS at 23:09

## 2020-03-20 RX ADMIN — METOPROLOL TARTRATE 50 MG: 25 TABLET, FILM COATED ORAL at 08:17

## 2020-03-20 RX ADMIN — PANTOPRAZOLE SODIUM 40 MG: 40 TABLET, DELAYED RELEASE ORAL at 05:26

## 2020-03-20 RX ADMIN — NYSTATIN: 100000 POWDER TOPICAL at 23:09

## 2020-03-20 RX ADMIN — BARIUM SULFATE 50 ML: 400 SUSPENSION ORAL at 13:15

## 2020-03-20 NOTE — PLAN OF CARE
Problem: Patient Care Overview  Goal: Plan of Care Review  3/20/2020 1726 by Bertin Bay, Nursing Student  Outcome: Ongoing (interventions implemented as appropriate)  Flowsheets (Taken 3/20/2020 1717)  Progress: improving  Plan of Care Reviewed With: patient  Outcome Summary: Pt is eating and drinking well. MBS completed today and upgraded to nectar thick liquids. Up assist x1 and fall percautions in place. Will continue to monitor WBC per hospitalist. D/C home when ready.

## 2020-03-20 NOTE — PROGRESS NOTES
Continued Stay Note  [facility]     Patient Name: Ita Pickett  MRN: [id number]  Today's Date: 3/20/2020    Admit Date: 3/18/2020    Discharge Plan     Row Name 03/20/20 0923       Plan    Plan  home    Plan Comments  CM spoke with pt at bedside. Pt reports she is feeling well after procedure yesterday. Pt still plans to return home alone with assistance from her friends and sister as needed. Pt has now decided she does not want home health services at time of discharge. CM instructed pt if she gets home and decides she does need home health to contact her PCP and they will assist her in getting it arranged, pt verbalized understanding. Pt denies needs at this time and will have private transportation home, CM will continue to follow.     Final Discharge Disposition Code  01 - home or self-care        Discharge Codes    No documentation.             Nereida Winslow RN

## 2020-03-20 NOTE — DISCHARGE INSTR - DIET
MBS/VFSS   3/20/20  Reason for Referral  Patient was referred for a MBS to assess the efficiency of his/her swallow function, rule out aspiration and make recommendations regarding safe dietary consistencies, effective compensatory strategies, and safe eating environment.             Recommendations/Treatment  SLP Swallowing Diagnosis: mild, oral dysfunction, mild-moderate, pharyngeal dysfunction  Functional Impact: risk of aspiration/pneumonia  Rehab Potential/Prognosis, Swallowing: good, to achieve stated therapy goals  Swallow Criteria for Skilled Therapeutic Interventions Met: demonstrates skilled criteria  Therapy Frequency (Swallow): 5 days per week  Predicted Duration Therapy Intervention (Days): until discharge  SLP Diet Recommendation: mechanical soft with no mixed consistencies, nectar thick liquids, other (see comments)(when diet upgraded from CL per MD)  Recommended Diagnostics: VFSS (MBS)  Recommended Precautions and Strategies: upright posture during/after eating, small bites of food and sips of liquid  SLP Rec. for Method of Medication Administration: meds whole, meds crushed, with pudding or applesauce  Monitor for Signs of Aspiration: yes, notify SLP if any concerns  Anticipated Dischage Disposition: unknown, anticipate therapy at next level of care    Instrumental Set-up  Utensils Used: spoon, cup, straw  Consistencies Trialed: thin liquids, nectar/syrup-thick liquids, pudding thick(DNT solid 2' liquid restriction)    Oral Preparation/ Oral Phase  Oral Prep Phase: WFL  Oral Transit Phase: impaired  Oral Residue: WFL     Impaired Oral Transit Phase: premature spillage of liquids into pharynx  Premature Spillage of Liquids into Pharynx: thin liquids       Pharyngeal Phase  Initiation of Pharyngeal Swallow: bolus in pyriform sinuses  Pharyngeal Phase: impaired pharyngeal phase of swallowing  Penetration During the Swallow: thin liquids, secondary to delayed swallow initiation or mistiming, secondary  to reduced vestibular closure  Response to Penetration: deep, other (see comments)(unable to clear)  Attempted Compensatory Maneuvers: bolus size, bolus presentation style, chin tuck, throat clear after swallow  Response to Attempted Compensatory Maneuvers: did not prevent penetration  VFSS Summary: SLP MBS evaluation completed. Pt presents w/ mild oral and mild-moderate pharyngeal dysphagia. Reduced lingual control resulting in pre-spill of liquids into the pharynx. Deep penetration to the level of the vocal folds during the swallow w/ thin liquid. Unable to clear penetration w/ cued cough/throat clear & unable to prevent w/ complensations. Eventual aspiration inevitable. No penetration/aspiration w/ nectar-thick liquid or pudding. DNT solid 2' currently on liquid restriction, but do not suspected pharyngeal difficulty w/ solids. Per Radiology PA, significant reflux noted. Safest recommendation @ this time is dysphagia level IV diet & nectar-thick liquid. Meds whole or crushed w/ pudding/puree. Standard aspiration precautions.    Cervical Esophageal Phase  Esophageal Phase: other (see comments)(significant reflux)             Nector thick liquids.

## 2020-03-20 NOTE — PROGRESS NOTES
Doing well.  No abd pain.   No leaking from PEG site.  OK for DC from GI  FU Dr. Mcintosh in 8 weeks- I will arrange  I will sign off  Can restart anticoagulation if needed.

## 2020-03-20 NOTE — PROGRESS NOTES
"[facility] at [facility] Progress Note     LOS: 2 days   Patient Care Team:  Lucho Peters MD as PCP - General (Family Medicine)  Anatoly Jay MD as PCP - Claims Attributed  Lucho Peters MD as Consulting Physician (Family Medicine)  Anatoly Jay MD as Referring Physician (General Surgery)  Nacho Hanna MD as Consulting Physician (Radiation Oncology)  Erik Pelletier MD as Consulting Physician (Gastroenterology)  Gage Collier MD as Consulting Physician (Pulmonary Disease)  PCP:  Lucho Peters MD    Chief Complaint:  Fu afib    SUBJECTIVE: no further afib      OBJECTIVE:    Vital Sign Min/Max for last 24 hours  Temp  Min: 96.3 °F (35.7 °C)  Max: 98.3 °F (36.8 °C)   BP  Min: 106/74  Max: 150/89   Pulse  Min: 62  Max: 102   Resp  Min: 15  Max: 20   SpO2  Min: 91 %  Max: 100 %   No data recorded   Weight  Min: 57.2 kg (126 lb)  Max: 57.2 kg (126 lb)     Flowsheet Rows      First Filed Value   Admission Height  165.1 cm (65\") Documented at 03/18/2020 1620   Admission Weight  57.2 kg (126 lb) Documented at 03/18/2020 1620          Telemetry: sr      Intake/Output Summary (Last 24 hours) at 3/20/2020 1258  Last data filed at 3/20/2020 0900  Gross per 24 hour   Intake 2028 ml   Output --   Net 2028 ml     Intake & Output (last 3 days)       03/17 0701 - 03/18 0700 03/18 0701 - 03/19 0700 03/19 0701 - 03/20 0700 03/20 0701 - 03/21 0700    P.O.    500    I.V. (mL/kg)   1528 (26.7)     IV Piggyback  1000      Total Intake(mL/kg)  1000 (17.5) 1528 (26.7) 500 (8.7)    Net  +1000 +1528 +500            Urine Unmeasured Occurrence  1 x 2 x 1 x           Physical Exam:  Physical Exam   Alert   On nc O2  s1s2  Chest diminished at bases  abd soft  Ext no ed  LABS/DIAGNOSTIC DATA:  Results from last 7 days   Lab Units 03/20/20  0651 03/19/20  0836 03/18/20  1632   WBC 10*3/mm3 24.51* 14.56* 16.50*   HEMOGLOBIN g/dL 8.9* 9.7* 10.3*   HEMATOCRIT % 29.8* " 31.7* 33.0*   PLATELETS 10*3/mm3 573* 538* 579*     Lab Results   Lab Value Date/Time    TROPONINT <0.010 03/18/2020 1632         Results from last 7 days   Lab Units 03/20/20  0651 03/19/20  1645 03/19/20  0836 03/18/20  1632   SODIUM mmol/L 142  --  139 137   POTASSIUM mmol/L 3.9 4.3 3.5 4.5   CHLORIDE mmol/L 107  --  100 91*   CO2 mmol/L 25.0  --  21.0* 30.0*   BUN mg/dL 24*  --  18 21   CREATININE mg/dL 0.63  --  0.59 0.87   CALCIUM mg/dL 8.4*  --  8.3* 9.3   BILIRUBIN mg/dL  --   --  0.3 0.7   ALK PHOS U/L  --   --  103 125*   ALT (SGPT) U/L  --   --  9 9   AST (SGOT) U/L  --   --  15 22   GLUCOSE mg/dL 133*  --  174* 144*             Results from last 7 days   Lab Units 03/19/20  0836   TSH uIU/mL 0.036*           Medication Review:     budesonide 0.5 mg Nebulization Daily - RT   doxycycline 100 mg Intravenous Q12H   enoxaparin 40 mg Subcutaneous Q24H   fluticasone 2 spray Nasal BID   lidocaine 1 patch Transdermal Q24H   methylPREDNISolone sodium succinate 40 mg Intravenous Q12H   metoprolol tartrate 50 mg Oral Q12H   pantoprazole 40 mg Oral Q AM   piperacillin-tazobactam 3.375 g Intravenous Q8H   sodium chloride 10 mL Intravenous Q12H        dilTIAZem 5-15 mg/hr Last Rate: Stopped (03/19/20 0918)   lactated ringers 20 mL/hr Last Rate: 20 mL/hr (03/19/20 1348)          Pain around PEG tube site    COPD (chronic obstructive pulmonary disease) (CMS/HCC)    History of lung cancer    Breast cancer (CMS/HCC)    Hodgkin lymphoma (CMS/HCC)    Dyspnea    SVT (supraventricular tachycardia) (CMS/HCC)    Pericardial effusion    Sepsis, unspecified organism (CMS/HCC)    Lactic acidosis      ASSESSMENT/PLAN:  Paf- cont bid metop  No anticoag at current    Home ok by me          Bassam Leroy MD   03/20/20  12:58

## 2020-03-20 NOTE — PLAN OF CARE
Problem: Fall Risk (Adult)  Goal: Identify Related Risk Factors and Signs and Symptoms  Outcome: Outcome(s) achieved  Flowsheets (Taken 3/20/2020 0308)  Related Risk Factors (Fall Risk): gait/mobility problems; age-related changes; fatigue/slow reaction  Signs and Symptoms (Fall Risk): presence of risk factors  Goal: Absence of Fall  Outcome: Outcome(s) achieved  Flowsheets (Taken 3/20/2020 0308)  Absence of Fall: making progress toward outcome

## 2020-03-20 NOTE — PLAN OF CARE
Problem: Patient Care Overview  Goal: Plan of Care Review  3/20/2020 1502 by Rafita Pang MS CCC-SLP  Outcome: Ongoing (interventions implemented as appropriate)  Flowsheets (Taken 3/20/2020 1502)  Plan of Care Reviewed With: patient  Note:   SLP MBS evaluation and treatment completed. Will address dysphagia. Please see note for further details and recommendations.

## 2020-03-20 NOTE — PROGRESS NOTES
[facility] Medicine Services  PROGRESS NOTE    Patient Name: Ita Pickett  : 1950  MRN: [id number]    Date of Admission: 3/18/2020  Primary Care Physician: Lucho Peters MD    Subjective   Subjective     CC:  Short of breath    HPI:  Doing OK today.  Still some occasional cough.  No fevers.  Pain in the right side of her neck she thinks from the hospital bed.    Review of Systems  Gen- No fevers, chills  CV- No chest pain, palpitations  Resp- Yes cough, dyspnea  GI- No N/V/D, abd pain    Objective   Objective     Vital Signs:   Temp:  [97.5 °F (36.4 °C)-98.3 °F (36.8 °C)] 98.1 °F (36.7 °C)  Heart Rate:  [62-81] 72  Resp:  [16-20] 20  BP: (106-150)/(62-89) (P) 142/78        Physical Exam:  Constitutional: No acute distress, awake, alert, sitting up in bed  HENT: NCAT, mucous membranes moist  Respiratory: Occasional wheezing bilaterally  Cardiovascular: RRR, no murmurs, sinus rhythm on telemetry  Gastrointestinal: Positive bowel sounds, soft, nontender, nondistended, old PEG site bandaged, minimal drainage, mild erythema surrounding site  Musculoskeletal: No bilateral ankle edema  Psychiatric: Appropriate affect, cooperative    Results Reviewed:  Results from last 7 days   Lab Units 20  0651 20  0836 20  2106 20  1632   WBC 10*3/mm3 24.51* 14.56*  --  16.50*   HEMOGLOBIN g/dL 8.9* 9.7*  --  10.3*   HEMATOCRIT % 29.8* 31.7*  --  33.0*   PLATELETS 10*3/mm3 573* 538*  --  579*   PROCALCITONIN ng/mL  --   --  0.37*  --      Results from last 7 days   Lab Units 20  0651 20  1645 20  0836 20  1632   SODIUM mmol/L 142  --  139 137   POTASSIUM mmol/L 3.9 4.3 3.5 4.5   CHLORIDE mmol/L 107  --  100 91*   CO2 mmol/L 25.0  --  21.0* 30.0*   BUN mg/dL 24*  --  18 21   CREATININE mg/dL 0.63  --  0.59 0.87   GLUCOSE mg/dL 133*  --  174* 144*   CALCIUM mg/dL 8.4*  --  8.3* 9.3   ALT (SGPT) U/L  --   --  9 9   AST (SGOT) U/L  --   --  15  22   TROPONIN T ng/mL  --   --   --  <0.010   PROBNP pg/mL  --   --   --  2,089.0*     Estimated Creatinine Clearance: 59.1 mL/min (by C-G formula based on SCr of 0.63 mg/dL).    Microbiology Results Abnormal     Procedure Component Value - Date/Time    Wound Culture - Wound, Stomach [[id number]]  (Abnormal) Collected:  03/19/20 0323    Lab Status:  Preliminary result Specimen:  Wound from Stomach Updated:  03/20/20 1050     Wound Culture Light growth (2+) Candida albicans      Light growth (2+) Normal Skin Cami     Gram Stain No WBCs or organisms seen    Blood Culture - Blood, Arm, Left [[id number]] Collected:  03/18/20 2106    Lab Status:  Preliminary result Specimen:  Blood from Arm, Left Updated:  03/19/20 2130     Blood Culture No growth at 24 hours    Blood Culture - Blood, Hand, Right [[id number]] Collected:  03/18/20 2106    Lab Status:  Preliminary result Specimen:  Blood from Hand, Right Updated:  03/19/20 2130     Blood Culture No growth at 24 hours    MRSA Screen, PCR - Swab, Nares [[id number]]  (Normal) Collected:  03/19/20 1136    Lab Status:  Final result Specimen:  Swab from Nares Updated:  03/19/20 1311     MRSA PCR Negative    Narrative:       MRSA Negative    Respiratory Panel, PCR - Swab, Nasopharynx [[id number]]  (Normal) Collected:  03/18/20 2129    Lab Status:  Final result Specimen:  Swab from Nasopharynx Updated:  03/19/20 0100     ADENOVIRUS, PCR Not Detected     Coronavirus 229E Not Detected     Coronavirus HKU1 Not Detected     Coronavirus NL63 Not Detected     Coronavirus OC43 Not Detected     Human Metapneumovirus Not Detected     Human Rhinovirus/Enterovirus Not Detected     Influenza B PCR Not Detected     Parainfluenza Virus 1 Not Detected     Parainfluenza Virus 2 Not Detected     Parainfluenza Virus 3 Not Detected     Parainfluenza Virus 4 Not Detected     Bordetella pertussis pcr Not Detected     Influenza A H1 2009 PCR Not Detected     Chlamydophila pneumoniae PCR Not Detected      Mycoplasma pneumo by PCR Not Detected     Influenza A PCR Not Detected     Influenza A H3 Not Detected     Influenza A H1 Not Detected     RSV, PCR Not Detected     Bordetella parapertussis PCR Not Detected    Narrative:       The coronavirus on the RVP is NOT COVID-19 and is NOT indicative of infection with COVID-19.           Imaging Results (Last 24 Hours)     Procedure Component Value Units Date/Time    FL Video Swallow With Speech Single Contrast [[id number]] Collected:  03/20/20 1318     Updated:  03/20/20 1319    Narrative:       EXAMINATION: FL VIDEO SWALLOW W SPEECH SINGLE-CONTRAST-     INDICATION: r/o aspiration; R06.00-Dyspnea, unspecified; R91.8-Other  nonspecific abnormal finding of lung field; I47.1-Supraventricular  tachycardia; D72.829-Elevated white blood cell count, unspecified;  T85.848A-Pain due to other internal prosthetic devices, implants and  grafts, initial encounter; T85.848S-Pain due to other internal  prosthetic devices, implants and grafts, sequela     TECHNIQUE: 54 seconds of fluoroscopic time was used for this exam. 1  associated image was saved. The patient was evaluated in the seated  lateral position while taking a variety of consistencies of barium by  mouth under the direction of speech pathology.     COMPARISON: NONE     FINDINGS: There was aspiration with sips of thin barium. There was no  penetration and no aspiration with nectar, pudding, or solid consistency  barium.          Impression:       Fluoroscopy provided for a modified barium swallow. Please  see speech therapy report for full details and recommendations.                 Results for orders placed during the hospital encounter of 03/18/20   STAT Adult Transthoracic Echo Complete W/ Cont if Necessary Per Protocol    Narrative · Left ventricular systolic function is normal. Estimated EF = 65%.  · Left ventricular wall thickness is consistent with mild concentric   hypertrophy.  · There is calcification of the aortic  valve.  · There is at least moderate aortic valve regurgitation is present.   Quantification is complicated by the presence of atrial fibrillation.  · Mild mitral valve regurgitation is present  · Moderate to severe tricuspid valve regurgitation is present.  · Estimated right ventricular systolic pressure from tricuspid   regurgitation is moderately elevated (45-55 mmHg).  · There is a moderate circumferential pericardial effusion with a maximal   diameter of 2.3 cm that is predominantly located adjacent to the right   ventricle. There was no evidence of cardiac tamponade. The right ventricle   did not demonstrate diastolic collapse. The respiratory variation was 17%   across the mitral valve and 29% across the tricuspid valve.          I have reviewed the medications:  Scheduled Meds:    budesonide 0.5 mg Nebulization Daily - RT   doxycycline 100 mg Intravenous Q12H   enoxaparin 40 mg Subcutaneous Q24H   fluticasone 2 spray Nasal BID   lidocaine 1 patch Transdermal Q24H   methylPREDNISolone sodium succinate 40 mg Intravenous Q12H   metoprolol tartrate 50 mg Oral Q12H   pantoprazole 40 mg Oral Q AM   piperacillin-tazobactam 3.375 g Intravenous Q8H   sodium chloride 10 mL Intravenous Q12H     Continuous Infusions:    dilTIAZem 5-15 mg/hr Last Rate: Stopped (03/19/20 0918)   lactated ringers 20 mL/hr Last Rate: 20 mL/hr (03/19/20 1348)     PRN Meds:.•  acetaminophen  •  diazePAM  •  HYDROcodone-acetaminophen  •  ipratropium-albuterol  •  magnesium sulfate **OR** magnesium sulfate **OR** magnesium sulfate  •  potassium chloride  •  potassium chloride  •  sodium chloride  •  sodium chloride  •  traZODone    Assessment/Plan   Assessment & Plan     Active Hospital Problems    Diagnosis  POA   • **Pain around PEG tube site [T85.848A]  Unknown   • Dyspnea [R06.00]  Yes   • SVT (supraventricular tachycardia) (CMS/HCC) [I47.1]  Unknown   • Pericardial effusion [I31.3]  Unknown   • Sepsis, unspecified organism (CMS/HCC)  [A41.9]  Unknown   • Lactic acidosis [E87.2]  Unknown   • Hodgkin lymphoma (CMS/HCC) [C81.90]  Yes   • Breast cancer (CMS/HCC) [C50.919]  Yes   • History of lung cancer [Z85.118]  Not Applicable   • COPD (chronic obstructive pulmonary disease) (CMS/HCC) [J44.9]  Yes      Resolved Hospital Problems   No resolved problems to display.        Brief Hospital Course to date:  Ita Pickett is a 69 y.o. female with COPD, chronic PEG removed approx 1 week ago with drainage since that time, and respiratory issues waxing and waning since January who presented with worse SOB found to have tachyarrhythmia at 200bpm and questionable LLL infiltrate on imaging with elevated procal and WBC.    Assessment/Plan    Sepsis from presumed PNA  Acute exacerbation of COPD  -Transition to PO steroids tomorrow  -Continue Zosyn and doxycycline  -Worsening leukocytosis may be due to steroids, recheck in am     Supraventricular tachyarrhythmias (SVT, Afib)  Hypomagnesemia  -SVT on arrival followed by Afib   -Evaluated by cardiology, started on PO metoprolol which will need to be continued BID.  Discussed with Dr. Leroy who talked to Dr. Mcintosh yesterday, feel risk of anticoagulation outweighs benefit at this time but may be considered at follow up.     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 Mcintosh.  S/P EGD 3/19/2020 with APC and clip closure of defect.  -F/U EGD in 2-3 months     Multiple primary malignancies (breast, lung, ovarian, Hodgkin's)     DVT Prophylaxis:  lovenox    Disposition: I expect the patient to be discharged 1-2 days depending on clinical improvement.    CODE STATUS:   Code Status and Medical Interventions:   Ordered at: 03/18/20 0260     Limited Support to NOT Include:    Vasopressors     Intubation    Cardioversion/Defibrillation     Code Status:    No CPR     Medical Interventions (Level of Support Prior to Arrest):    Limited     Comments:    d/w patient, she has a living will reflective of this         Electronically signed by Yasmeen Beth MD, 03/20/20, 15:23.

## 2020-03-20 NOTE — MBS/VFSS/FEES
Acute Care - Speech Language Pathology   Swallow Initial Evaluation [facility]   Modified Barium Swallow Study (MBS)     Patient Name: Ita Pickett  : 1950  MRN: [id number]  Today's Date: 3/20/2020               Admit Date: 3/18/2020    Visit Dx:     ICD-10-CM ICD-9-CM   1. Dyspnea, unspecified type R06.00 786.09   2. Lung infiltrate R91.8 793.19   3. SVT (supraventricular tachycardia) (CMS/HCC) I47.1 427.89   4. Leukocytosis, unspecified type D72.829 288.60   5. Pain around percutaneous endoscopic gastrostomy (PEG) tube site, initial encounter T85.848A 536.49     338.18   6. Pain around percutaneous endoscopic gastrostomy (PEG) tube site, sequela T85.848S 909.3     Patient Active Problem List   Diagnosis   • COPD (chronic obstructive pulmonary disease) (CMS/HCC)   • Achalasia   • History of lung cancer   • Breast cancer (CMS/HCC)   • History of esophageal stricture   • Esophageal dysphagia   • History of radiation therapy   • Pain around PEG tube site   • Hodgkin lymphoma (CMS/HCC)   • Dyspnea   • SVT (supraventricular tachycardia) (CMS/HCC)   • Pericardial effusion   • Sepsis, unspecified organism (CMS/HCC)   • Lactic acidosis     Past Medical History:   Diagnosis Date   • Breast cancer (CMS/HCC)    • Cancer (CMS/HCC)     lung   • COPD (chronic obstructive pulmonary disease) (CMS/HCC)    • Disease of thyroid gland    • Dysrhythmia     tachycardia   • Frequent PVCs     bigeminal pvc   • Hodgkin's disease (CMS/HCC)    • Hypertension    • Lung cancer (CMS/HCC)    • Ovarian cancer (CMS/HCC)      Past Surgical History:   Procedure Laterality Date   • BREAST LUMPECTOMY     • CATARACT EXTRACTION     • CATARACT EXTRACTION W/ INTRAOCULAR LENS IMPLANTW/ TRABECULECTOMY     • COLONOSCOPY      limited due to scar tissue more thatn 10 years ago   • ENDOSCOPY     • ENDOSCOPY N/A 3/19/2020    Procedure: ESOPHAGOGASTRODUODENOSCOPY WITH ARGON PLASMA COAGULATOR;  Surgeon: Felice Mcintosh MD;  Location:  " [facility];  Service: Gastroenterology;  Laterality: N/A;   • GASTROSTOMY FEEDING TUBE INSERTION  2018    [facility]   • HIP ARTHROSCOPY Right 2011    total hip replacement    • HYSTERECTOMY     • LUNG SURGERY  2000   • RETINAL DETACHMENT REPAIR  2015   • SPLENECTOMY  1980   • THYROIDECTOMY  2012    partial   • UPPER GASTROINTESTINAL ENDOSCOPY  2018 x 3    esophageal strictures by [facility]        SWALLOW EVALUATION (last 72 hours)      SLP Adult Swallow Evaluation     Row Name 03/20/20 1250 03/20/20 1050                Rehab Evaluation    Document Type  evaluation  -MP  evaluation  -MP       Subjective Information  no complaints  -MP  no complaints  -MP       Patient Observations  alert;cooperative  -MP  alert;cooperative  -MP       Patient/Family Observations  No family present  -MP  No family present  -MP       Patient Effort  good  -MP  good  -MP          General Information    Patient Profile Reviewed  yes  -MP  yes  -MP       Pertinent History Of Current Problem  See AM eval  -MP  Adm 3/18 w/ pain around PEG site. Hx COPD, four primary malignancies - breast, ovarian, Hodgkin's, lung. Recent PEG removal - had not used in 15 months. Recently tx for flu & bronchitis @ [facility] in January - improved but then developed PNA. Hx esophageal strictures, dilations, & type II achalasia. EGD yesterday. Per MD note, \"CT not convincing for PNA, but does have leukocytosis.\"  -MP       Current Method of Nutrition  --  clear liquids  -MP       Precautions/Limitations, Vision  --  WFL;for purposes of eval  -MP       Precautions/Limitations, Hearing  --  WFL;other (see comments)  -MP       Prior Level of Function-Communication  --  WFL  -MP       Prior Level of Function-Swallowing  --  esophageal concerns  -MP       Plans/Goals Discussed with  --  patient;agreed upon  -MP       Barriers to Rehab  --  none identified  -MP       Patient's Goals for Discharge  --  patient did not state  -MP          Pain " Assessment    Additional Documentation  Pain Scale: FACES Pre/Post-Treatment (Group)  -MP  Pain Scale: FACES Pre/Post-Treatment (Group)  -MP          Pain Scale: FACES Pre/Post-Treatment    Pain: FACES Scale, Pretreatment  0-->no hurt  -MP  0-->no hurt  -MP       Pain: FACES Scale, Post-Treatment  0-->no hurt  -MP  0-->no hurt  -MP          Oral Motor and Function    Dentition Assessment  --  natural, present and adequate  -MP       Secretion Management  --  WNL/WFL  -MP       Mucosal Quality  --  moist, healthy  -MP          Oral Musculature and Cranial Nerve Assessment    Oral Motor General Assessment  --  WFL  -MP          General Eating/Swallowing Observations    Respiratory Support Currently in Use  --  nasal cannula  -MP       Eating/Swallowing Skills  --  self-fed;fed by SLP  -MP       Positioning During Eating  --  upright in bed  -MP       Utensils Used  --  spoon;cup;straw  -MP       Consistencies Trialed  --  thin liquids;pureed  -MP          Clinical Swallow Eval    Pharyngeal Phase  --  suspected pharyngeal impairment  -MP       Esophageal Phase  --  suspected esophageal impairment  -MP       Clinical Swallow Evaluation Summary  --  Clinical swallow evaluation completed. Pt given trials of ice chipx1, thin via tsp/cup/straw, & puree. DNT solid 2' current CL restriction. Belching & throat clearing following trials of thin liquid. Pt reports this happens frequently. Possibly related to known hx of esophageal issues. However, given pt's report of PNA & some difficulty swallowing, in agreement to complete MBS to further assess swallow function. Discussed w/ RN.  -MP          Pharyngeal Phase Concerns    Pharyngeal Phase Concerns  --  throat clear  -MP       Throat Clear  --  thin  -MP          Esophageal Phase Concerns    Esophageal Phase Concerns  --  belching  -MP       Belching  --  thin  -MP          MBS/VFSS    Utensils Used  spoon;cup;straw  -MP  --       Consistencies Trialed  thin  liquids;nectar/syrup-thick liquids;pudding thick DNT solid 2' liquid restriction  -MP  --          MBS/VFSS Interpretation    Oral Prep Phase  WFL  -MP  --       Oral Transit Phase  impaired  -MP  --       Oral Residue  WFL  -MP  --       VFSS Summary  SLP MBS evaluation completed. Pt presents w/ mild oral and mild-moderate pharyngeal dysphagia. Reduced lingual control resulting in pre-spill of liquids into the pharynx. Deep penetration to the level of the vocal folds during the swallow w/ thin liquid. Unable to clear penetration w/ cued cough/throat clear & unable to prevent w/ complensations. Eventual aspiration inevitable. No penetration/aspiration w/ nectar-thick liquid or pudding. DNT solid 2' currently on liquid restriction, but do not suspected pharyngeal difficulty w/ solids. Per Radiology PA, significant reflux noted. Safest recommendation @ this time is dysphagia level IV diet & nectar-thick liquid. Meds whole or crushed w/ pudding/puree. Standard aspiration precautions.  -MP  --          Oral Transit Phase    Impaired Oral Transit Phase  premature spillage of liquids into pharynx  -MP  --       Premature Spillage of Liquids into Pharynx  thin liquids  -MP  --          Initiation of Pharyngeal Swallow    Initiation of Pharyngeal Swallow  bolus in pyriform sinuses  -MP  --       Pharyngeal Phase  impaired pharyngeal phase of swallowing  -MP  --       Penetration During the Swallow  thin liquids;secondary to delayed swallow initiation or mistiming;secondary to reduced vestibular closure  -MP  --       Response to Penetration  deep;other (see comments) unable to clear  -MP  --       Rosenbek's Scale  thin:;5--->level 5  -MP  --       Attempted Compensatory Maneuvers  bolus size;bolus presentation style;chin tuck;throat clear after swallow  -MP  --       Response to Attempted Compensatory Maneuvers  did not prevent penetration  -MP  --          Esophageal Phase    Esophageal Phase  other (see comments)  significant reflux  -MP  --          Clinical Impression    SLP Swallowing Diagnosis  mild;oral dysfunction;mild-moderate;pharyngeal dysfunction  -MP  suspected pharyngeal dysfunction  -MP       Functional Impact  risk of aspiration/pneumonia  -MP  risk of aspiration/pneumonia  -MP       Rehab Potential/Prognosis, Swallowing  good, to achieve stated therapy goals  -MP  good, to achieve stated therapy goals  -MP       Swallow Criteria for Skilled Therapeutic Interventions Met  demonstrates skilled criteria  -MP  demonstrates skilled criteria  -MP          Recommendations    Therapy Frequency (Swallow)  5 days per week  -MP  --       Predicted Duration Therapy Intervention (Days)  until discharge  -MP  --       SLP Diet Recommendation  mechanical soft with no mixed consistencies;nectar thick liquids;other (see comments) when diet upgraded from CL per MD  -MP  other (see comments) continue MD ordered diet until MBS  -MP       Recommended Diagnostics  --  VFSS (MBS)  -MP       Recommended Precautions and Strategies  upright posture during/after eating;small bites of food and sips of liquid  -MP  upright posture during/after eating;small bites of food and sips of liquid  -MP       SLP Rec. for Method of Medication Administration  meds whole;meds crushed;with pudding or applesauce  -MP  meds whole;meds crushed;with pudding or applesauce  -MP       Monitor for Signs of Aspiration  yes;notify SLP if any concerns  -MP  yes;notify SLP if any concerns  -MP       Anticipated Dischage Disposition  unknown;anticipate therapy at next level of care  -MP  unknown  -MP         User Key  (r) = Recorded By, (t) = Taken By, (c) = Cosigned By    Initials Name Effective Dates    Rafita Johnson MS CCC-SLP 06/19/19 -           EDUCATION  The patient has been educated in the following areas:   Dysphagia (Swallowing Impairment) Modified Diet Instruction.    SLP Recommendation and Plan  SLP Swallowing Diagnosis: mild, oral dysfunction,  mild-moderate, pharyngeal dysfunction  SLP Diet Recommendation: mechanical soft with no mixed consistencies, nectar thick liquids, other (see comments)(when diet upgraded from CL per MD)  Recommended Precautions and Strategies: upright posture during/after eating, small bites of food and sips of liquid  SLP Rec. for Method of Medication Administration: meds whole, meds crushed, with pudding or applesauce     Monitor for Signs of Aspiration: yes, notify SLP if any concerns  Recommended Diagnostics: VFSS (MBS)  Swallow Criteria for Skilled Therapeutic Interventions Met: demonstrates skilled criteria  Anticipated Dischage Disposition: unknown, anticipate therapy at next level of care  Rehab Potential/Prognosis, Swallowing: good, to achieve stated therapy goals  Therapy Frequency (Swallow): 5 days per week  Predicted Duration Therapy Intervention (Days): until discharge       Plan of Care Reviewed With: patient    SLP GOALS     Row Name 03/20/20 1345 03/20/20 1250          Oral Nutrition/Hydration Goal 1 (SLP)    Oral Nutrition/Hydration Goal 1, SLP  LTG: Pt will return to regular diet, thin liquids w/ no overt s/sxs aspiration/distress w/ 100% acc and no cues  -MP  LTG: Pt will return to regular diet, thin liquids w/ no overt s/sxs aspiration/distress w/ 100% acc and no cues  -MP     Time Frame (Oral Nutrition/Hydration Goal 1, SLP)  by discharge  -MP  by discharge  -MP     Progress/Outcomes (Oral Nutrition/Hydration Goal 1, SLP)  goal ongoing  -MP  --        Oral Nutrition/Hydration Goal 2 (SLP)    Oral Nutrition/Hydration Goal 2, SLP  Pt will tolerate soft solids & nectar-thick liquid w/ no overt s/sxs aspiration/distress w/ 100% acc and no cues  -MP  Pt will tolerate soft solids & nectar-thick liquid w/ no overt s/sxs aspiration/distress w/ 100% acc and no cues  -MP     Time Frame (Oral Nutrition/Hydration Goal 2, SLP)  short term goal (STG);by discharge  -MP  short term goal (STG);by discharge  -MP     Barriers  (Oral Nutrition/Hydration Goal 2, SLP)  Provided extensive education, handouts, & starter pack of thickener  -MP  --     Progress/Outcomes (Oral Nutrition/Hydration Goal 2, SLP)  continuing progress toward goal  -MP  --        Oral Nutrition/Hydration Goal (SLP)    Oral Nutrition/Hydration Goal, SLP  Pt will tolerate therapeutic trials of thin H2O w/ no overt s/sxs aspiration/distress w/ 70% acc and no cues in order to determine readiness for repeat instrumental eval  -MP  Pt will tolerate therapeutic trials of thin H2O w/ no overt s/sxs aspiration/distress w/ 70% acc and no cues in order to determine readiness for repeat instrumental eval  -MP     Time Frame (Oral Nutrition/Hydration Goal, SLP)  short term goal (STG);by discharge  -MP  short term goal (STG);by discharge  -MP     Progress/Outcomes (Oral Nutrition/Hydration Goal, SLP)  goal ongoing  -MP  --        Lingual Strengthening Goal 1 (SLP)    Activity (Lingual Strengthening Goal 1, SLP)  increase tongue back strength  -MP  increase tongue back strength  -MP     Increase Tongue Back Strength  swallow trials;lingual resistance exercises  -MP  swallow trials;lingual resistance exercises  -MP     [place]/Accuracy (Lingual Strengthening Goal 1, SLP)  with minimal cues (75-90% accuracy)  -MP  with minimal cues (75-90% accuracy)  -MP     Time Frame (Lingual Strengthening Goal 1, SLP)  short term goal (STG);by discharge  -MP  short term goal (STG);by discharge  -MP     Progress/Outcomes (Lingual Strengthening Goal 1, SLP)  goal ongoing  -MP  --        Pharyngeal Strengthening Exercise Goal 1 (SLP)    Activity (Pharyngeal Strengthening Goal 1, SLP)  increase timing;increase closure at entrance to airway/closure of airway at glottis  -MP  increase timing;increase closure at entrance to airway/closure of airway at glottis  -MP     Increase Timing  prepping - 3 second prep or suck swallow or 3-step swallow  -MP  prepping - 3 second prep or suck swallow or 3-step  swallow  -MP     Increase Closure at Entrance to Airway/Closure of Airway at Glottis  super-supraglottic swallow  -MP  super-supraglottic swallow  -MP     [place]/Accuracy (Pharyngeal Strengthening Goal 1, SLP)  with minimal cues (75-90% accuracy)  -MP  with minimal cues (75-90% accuracy)  -MP     Time Frame (Pharyngeal Strengthening Goal 1, SLP)  short term goal (STG);by discharge  -MP  short term goal (STG);by discharge  -MP     Barriers (Pharyngeal Strengthening Goal 1, SLP)  Provided handout of dysphagia exercises for independent practice  -MP  --     Progress/Outcomes (Pharyngeal Strengthening Goal 1, SLP)  continuing progress toward goal  -MP  --        Swallow Management Recall Goal 1 (SLP)    Activity (Swallow Management Recall Goal 1, SLP)  independent recall of;safe diet/liquid level;safe diet level/texture  -MP  independent recall of;safe diet/liquid level;safe diet level/texture  -MP     [place]/Accuracy (Swallow Management Recall Goal 1, SLP)  independently (over 90% accuracy)  -MP  independently (over 90% accuracy)  -MP     Time Frame (Swallow Management Recall Goal 1, SLP)  short term goal (STG);by discharge  -MP  short term goal (STG);by discharge  -MP     Barriers (Swallow Management Recall Goal 1, SLP)  Reviewed rationale for thickening liquids  -MP  --     Progress/Outcomes (Swallow Management Recall Goal 1, SLP)  continuing progress toward goal  -MP  --        Swallow Compensatory Strategies Goal 1 (SLP)    Activity (Swallow Compensatory Strategies/Techniques Goal 1, SLP)  compensatory strategies;other (see comments);during p.o. trials;during meal intake reflux precautions  -MP  compensatory strategies;other (see comments);during p.o. trials;during meal intake reflux precautions  -MP     [place]/Accuracy (Swallow Compensatory Strategies/Techniques Goal 1, SLP)  with minimal cues (75-90% accuracy)  -MP  with minimal cues (75-90% accuracy)  -MP     Time Frame (Swallow Compensatory  Strategies/Techniques Goal 1, SLP)  short term goal (STG);by discharge  -MP  short term goal (STG);by discharge  -MP     Barriers (Swallow Compensatory Strategies/Techniques Goal 1, SLP)  Reviewed reflux precautions  -MP  --     Progress/Outcomes (Swallow Compensatory Strategies/Techniques Goal 1, SLP)  continuing progress toward goal  -MP  --       User Key  (r) = Recorded By, (t) = Taken By, (c) = Cosigned By    Initials Name Provider Type    Rafita Johnson MS CCC-SLP Speech and Language Pathologist             Time Calculation:   Time Calculation- SLP     Row Name 20 1503 20 1204          Time Calculation- SLP    SLP Start Time  1250  -MP  1050  -MP     SLP Received On  20  -MP  20  -MP       User Key  (r) = Recorded By, (t) = Taken By, (c) = Cosigned By    Initials Name Provider Type    Rafita Johnson MS CCC-SLP Speech and Language Pathologist          Therapy Charges for Today     Code Description Service Date Service Provider Modifiers Qty    76373723656 HC ST EVAL ORAL PHARYNG SWALLOW 3 3/20/2020 Rafita Pang MS CCC-SLP GN 1    58256501299 HC ST MOTION FLUORO EVAL SWALLOW 4 3/20/2020 Rafita Pang MS CCC-SLP GN 1    78720090218 HC ST TREATMENT SWALLOW 3 3/20/2020 Rafita Pang MS CCC-SLP GN 1               Rafita Pang MS CCC-SLP  3/20/2020 and Acute Care - Speech Language Pathology   Swallow Treatment Note [facility]     Patient Name: Ita Pickett  : 1950  MRN: [id number]  Today's Date: 3/20/2020               Admit Date: 3/18/2020    Visit Dx:      ICD-10-CM ICD-9-CM   1. Dyspnea, unspecified type R06.00 786.09   2. Lung infiltrate R91.8 793.19   3. SVT (supraventricular tachycardia) (CMS/HCC) I47.1 427.89   4. Leukocytosis, unspecified type D72.829 288.60   5. Pain around percutaneous endoscopic gastrostomy (PEG) tube site, initial encounter T85.848A 536.49     338.18   6. Pain around percutaneous endoscopic gastrostomy (PEG) tube site,  sequela T85.848S 909.3     Patient Active Problem List   Diagnosis   • COPD (chronic obstructive pulmonary disease) (CMS/HCC)   • Achalasia   • History of lung cancer   • Breast cancer (CMS/HCC)   • History of esophageal stricture   • Esophageal dysphagia   • History of radiation therapy   • Pain around PEG tube site   • Hodgkin lymphoma (CMS/HCC)   • Dyspnea   • SVT (supraventricular tachycardia) (CMS/HCC)   • Pericardial effusion   • Sepsis, unspecified organism (CMS/HCC)   • Lactic acidosis       Therapy Treatment  Rehabilitation Treatment Summary     Row Name 03/20/20 1345             Treatment Time/Intention    Discipline  speech language pathologist  -MP      Document Type  therapy note (daily note)  -MP      Subjective Information  no complaints  -MP      Mode of Treatment  individual therapy;speech-language pathology  -MP      Patient/Family Observations  No family present  -MP      Care Plan Review  care plan/treatment goals reviewed;patient/other agree to care plan  -MP      Therapy Frequency (Swallow)  5 days per week  -MP      Patient Effort  good  -MP      Recorded by [MP] Rafita Pang MS CCC-SLP 03/20/20 1458      Row Name 03/20/20 1345             Pain Assessment    Additional Documentation  Pain Scale: FACES Pre/Post-Treatment (Group)  -MP      Recorded by [MP] Rafita Pang MS CCC-SLP 03/20/20 1458      Row Name 03/20/20 1345             Pain Scale: FACES Pre/Post-Treatment    Pain: FACES Scale, Pretreatment  0-->no hurt  -MP      Pain: FACES Scale, Post-Treatment  0-->no hurt  -MP      Recorded by [MP] Rafita Pang MS CCC-SLP 03/20/20 1458      Row Name                Wound 03/18/20 2245 Left lower;medial abdomen Other (comment)    Wound - Properties Group Date first assessed: 03/18/20 [DW] Time first assessed: 2245 [DW] Present on Hospital Admission: Y [DW] Side: Left [DW] Orientation: lower;medial [DW] Location: abdomen [DW] Primary Wound Type: Other [DW], peg tube site  Recorded  by:  [DW] Lakeisha Alejandro RN 03/19/20 0259    Row Name 03/20/20 1345             Outcome Summary/Treatment Plan (SLP)    Daily Summary of Progress (SLP)  progress toward functional goals as expected  -MP      Plan for Continued Treatment (SLP)  Pt seen for follow-up/education after MBS this AM. Provided extensive education/handouts re: thickening liquids, modifying diet @ home, and dysphagia exercises. Additionally provided starter pack of thickener.  -MP      Anticipated Dischage Disposition  unknown;anticipate therapy at next level of care  -MP      Recorded by [MP] Rafita Pang, MS CCC-SLP 03/20/20 9599        User Key  (r) = Recorded By, (t) = Taken By, (c) = Cosigned By    Initials Name Effective Dates Discipline    DW Lakeisha Alejandro RN 06/18/19 -  Nurse    MP Rafita Pang, MS CCC-SLP 06/19/19 -  SLP          Outcome Summary  Outcome Summary/Treatment Plan (SLP)  Daily Summary of Progress (SLP): progress toward functional goals as expected (03/20/20 1345 : Rafita Pang, MS CCC-SLP)  Plan for Continued Treatment (SLP): Pt seen for follow-up/education after MBS this AM. Provided extensive education/handouts re: thickening liquids, modifying diet @ home, and dysphagia exercises. Additionally provided starter pack of thickener. (03/20/20 1345 : Rafita Pang, MS CCC-SLP)  Anticipated Dischage Disposition: unknown, anticipate therapy at next level of care (03/20/20 1345 : Rafita Pang, MS CCC-SLP)      SLP GOALS     Row Name 03/20/20 1345 03/20/20 1250          Oral Nutrition/Hydration Goal 1 (SLP)    Oral Nutrition/Hydration Goal 1, SLP  LTG: Pt will return to regular diet, thin liquids w/ no overt s/sxs aspiration/distress w/ 100% acc and no cues  -MP  LTG: Pt will return to regular diet, thin liquids w/ no overt s/sxs aspiration/distress w/ 100% acc and no cues  -MP     Time Frame (Oral Nutrition/Hydration Goal 1, SLP)  by discharge  -MP  by discharge  -MP     Progress/Outcomes (Oral  Nutrition/Hydration Goal 1, SLP)  goal ongoing  -MP  --        Oral Nutrition/Hydration Goal 2 (SLP)    Oral Nutrition/Hydration Goal 2, SLP  Pt will tolerate soft solids & nectar-thick liquid w/ no overt s/sxs aspiration/distress w/ 100% acc and no cues  -MP  Pt will tolerate soft solids & nectar-thick liquid w/ no overt s/sxs aspiration/distress w/ 100% acc and no cues  -MP     Time Frame (Oral Nutrition/Hydration Goal 2, SLP)  short term goal (STG);by discharge  -MP  short term goal (STG);by discharge  -MP     Barriers (Oral Nutrition/Hydration Goal 2, SLP)  Provided extensive education, handouts, & starter pack of thickener  -MP  --     Progress/Outcomes (Oral Nutrition/Hydration Goal 2, SLP)  continuing progress toward goal  -MP  --        Oral Nutrition/Hydration Goal (SLP)    Oral Nutrition/Hydration Goal, SLP  Pt will tolerate therapeutic trials of thin H2O w/ no overt s/sxs aspiration/distress w/ 70% acc and no cues in order to determine readiness for repeat instrumental eval  -MP  Pt will tolerate therapeutic trials of thin H2O w/ no overt s/sxs aspiration/distress w/ 70% acc and no cues in order to determine readiness for repeat instrumental eval  -MP     Time Frame (Oral Nutrition/Hydration Goal, SLP)  short term goal (STG);by discharge  -MP  short term goal (STG);by discharge  -MP     Progress/Outcomes (Oral Nutrition/Hydration Goal, SLP)  goal ongoing  -MP  --        Lingual Strengthening Goal 1 (SLP)    Activity (Lingual Strengthening Goal 1, SLP)  increase tongue back strength  -MP  increase tongue back strength  -MP     Increase Tongue Back Strength  swallow trials;lingual resistance exercises  -MP  swallow trials;lingual resistance exercises  -MP     [place]/Accuracy (Lingual Strengthening Goal 1, SLP)  with minimal cues (75-90% accuracy)  -MP  with minimal cues (75-90% accuracy)  -MP     Time Frame (Lingual Strengthening Goal 1, SLP)  short term goal (STG);by discharge  -MP  short term goal  (STG);by discharge  -MP     Progress/Outcomes (Lingual Strengthening Goal 1, SLP)  goal ongoing  -MP  --        Pharyngeal Strengthening Exercise Goal 1 (SLP)    Activity (Pharyngeal Strengthening Goal 1, SLP)  increase timing;increase closure at entrance to airway/closure of airway at glottis  -MP  increase timing;increase closure at entrance to airway/closure of airway at glottis  -MP     Increase Timing  prepping - 3 second prep or suck swallow or 3-step swallow  -MP  prepping - 3 second prep or suck swallow or 3-step swallow  -MP     Increase Closure at Entrance to Airway/Closure of Airway at Glottis  super-supraglottic swallow  -MP  super-supraglottic swallow  -MP     [place]/Accuracy (Pharyngeal Strengthening Goal 1, SLP)  with minimal cues (75-90% accuracy)  -MP  with minimal cues (75-90% accuracy)  -MP     Time Frame (Pharyngeal Strengthening Goal 1, SLP)  short term goal (STG);by discharge  -MP  short term goal (STG);by discharge  -MP     Barriers (Pharyngeal Strengthening Goal 1, SLP)  Provided handout of dysphagia exercises for independent practice  -MP  --     Progress/Outcomes (Pharyngeal Strengthening Goal 1, SLP)  continuing progress toward goal  -MP  --        Swallow Management Recall Goal 1 (SLP)    Activity (Swallow Management Recall Goal 1, SLP)  independent recall of;safe diet/liquid level;safe diet level/texture  -MP  independent recall of;safe diet/liquid level;safe diet level/texture  -MP     [place]/Accuracy (Swallow Management Recall Goal 1, SLP)  independently (over 90% accuracy)  -MP  independently (over 90% accuracy)  -MP     Time Frame (Swallow Management Recall Goal 1, SLP)  short term goal (STG);by discharge  -MP  short term goal (STG);by discharge  -MP     Barriers (Swallow Management Recall Goal 1, SLP)  Reviewed rationale for thickening liquids  -MP  --     Progress/Outcomes (Swallow Management Recall Goal 1, SLP)  continuing progress toward goal  -MP  --        Swallow  Compensatory Strategies Goal 1 (SLP)    Activity (Swallow Compensatory Strategies/Techniques Goal 1, SLP)  compensatory strategies;other (see comments);during p.o. trials;during meal intake reflux precautions  -MP  compensatory strategies;other (see comments);during p.o. trials;during meal intake reflux precautions  -MP     [place]/Accuracy (Swallow Compensatory Strategies/Techniques Goal 1, SLP)  with minimal cues (75-90% accuracy)  -MP  with minimal cues (75-90% accuracy)  -MP     Time Frame (Swallow Compensatory Strategies/Techniques Goal 1, SLP)  short term goal (STG);by discharge  -MP  short term goal (STG);by discharge  -MP     Barriers (Swallow Compensatory Strategies/Techniques Goal 1, SLP)  Reviewed reflux precautions  -MP  --     Progress/Outcomes (Swallow Compensatory Strategies/Techniques Goal 1, SLP)  continuing progress toward goal  -MP  --       User Key  (r) = Recorded By, (t) = Taken By, (c) = Cosigned By    Initials Name Provider Type    Rafita Johnson MS CCC-SLP Speech and Language Pathologist          EDUCATION  The patient has been educated in the following areas:   Dysphagia (Swallowing Impairment) Modified Diet Instruction.    SLP Recommendation and Plan  Daily Summary of Progress (SLP): progress toward functional goals as expected     Plan for Continued Treatment (SLP): Pt seen for follow-up/education after MBS this AM. Provided extensive education/handouts re: thickening liquids, modifying diet @ home, and dysphagia exercises. Additionally provided starter pack of thickener.  Anticipated Dischage Disposition: unknown, anticipate therapy at next level of care                    Time Calculation:   Time Calculation- SLP     Row Name 03/20/20 1503 03/20/20 1204          Time Calculation- SLP    SLP Start Time  1250  -MP  1050  -MP     SLP Received On  03/20/20  -MP  03/20/20  -MP       User Key  (r) = Recorded By, (t) = Taken By, (c) = Cosigned By    Initials Name Provider Type    PANDA  Rafita Pang, MS CCC-SLP Speech and Language Pathologist          Therapy Charges for Today     Code Description Service Date Service Provider Modifiers Qty    62054660593 HC ST EVAL ORAL PHARYNG SWALLOW 3 3/20/2020 Rafita Pang MS CCC-SLP GN 1    50242414589 HC ST MOTION FLUORO EVAL SWALLOW 4 3/20/2020 Rafita Pang MS CCC-SLP GN 1    21262709102 HC ST TREATMENT SWALLOW 3 3/20/2020 Rafita Pang MS CCC-SLP GN 1                 Rafita Pang MS CCC-BECK  3/20/2020

## 2020-03-20 NOTE — THERAPY EVALUATION
Acute Care - Speech Language Pathology   Swallow Initial Evaluation [facility]   Clinical Swallow Evaluation     Patient Name: Ita Pickett  : 1950  MRN: [id number]  Today's Date: 3/20/2020               Admit Date: 3/18/2020    Visit Dx:     ICD-10-CM ICD-9-CM   1. Dyspnea, unspecified type R06.00 786.09   2. Lung infiltrate R91.8 793.19   3. SVT (supraventricular tachycardia) (CMS/HCC) I47.1 427.89   4. Leukocytosis, unspecified type D72.829 288.60   5. Pain around percutaneous endoscopic gastrostomy (PEG) tube site, initial encounter T85.848A 536.49     338.18   6. Pain around percutaneous endoscopic gastrostomy (PEG) tube site, sequela T85.848S 909.3     Patient Active Problem List   Diagnosis   • COPD (chronic obstructive pulmonary disease) (CMS/HCC)   • Achalasia   • History of lung cancer   • Breast cancer (CMS/HCC)   • History of esophageal stricture   • Esophageal dysphagia   • History of radiation therapy   • Pain around PEG tube site   • Hodgkin lymphoma (CMS/HCC)   • Dyspnea   • SVT (supraventricular tachycardia) (CMS/HCC)   • Pericardial effusion   • Sepsis, unspecified organism (CMS/HCC)   • Lactic acidosis     Past Medical History:   Diagnosis Date   • Breast cancer (CMS/HCC)    • Cancer (CMS/HCC)     lung   • COPD (chronic obstructive pulmonary disease) (CMS/HCC)    • Disease of thyroid gland    • Dysrhythmia     tachycardia   • Frequent PVCs     bigeminal pvc   • Hodgkin's disease (CMS/HCC)    • Hypertension    • Lung cancer (CMS/HCC)    • Ovarian cancer (CMS/HCC)      Past Surgical History:   Procedure Laterality Date   • BREAST LUMPECTOMY     • CATARACT EXTRACTION     • CATARACT EXTRACTION W/ INTRAOCULAR LENS IMPLANTW/ TRABECULECTOMY     • COLONOSCOPY      limited due to scar tissue more thatn 10 years ago   • ENDOSCOPY     • ENDOSCOPY N/A 3/19/2020    Procedure: ESOPHAGOGASTRODUODENOSCOPY WITH ARGON PLASMA COAGULATOR;  Surgeon: Felice Mcintosh MD;  Location: [facility]  "ENDOSCOPY;  Service: Gastroenterology;  Laterality: N/A;   • GASTROSTOMY FEEDING TUBE INSERTION  2018    [facility]   • HIP ARTHROSCOPY Right 2011    total hip replacement    • HYSTERECTOMY     • LUNG SURGERY  2000   • RETINAL DETACHMENT REPAIR  2015   • SPLENECTOMY  1980   • THYROIDECTOMY  2012    partial   • UPPER GASTROINTESTINAL ENDOSCOPY  2018 x 3    esophageal strictures by [facility]        SWALLOW EVALUATION (last 72 hours)      SLP Adult Swallow Evaluation     Row Name 03/20/20 1050                   Rehab Evaluation    Document Type  evaluation  -MP        Subjective Information  no complaints  -MP        Patient Observations  alert;cooperative  -MP        Patient/Family Observations  No family present  -MP        Patient Effort  good  -MP           General Information    Patient Profile Reviewed  yes  -MP        Pertinent History Of Current Problem  Adm 3/18 w/ pain around PEG site. Hx COPD, four primary malignancies - breast, ovarian, Hodgkin's, lung. Recent PEG removal - had not used in 15 months. Recently tx for flu & bronchitis @ [facility] in January - improved but then developed PNA. Hx esophageal strictures, dilations, & type II achalasia. EGD yesterday. Per MD note, \"CT not convincing for PNA, but does have leukocytosis.\"  -MP        Current Method of Nutrition  clear liquids  -MP        Precautions/Limitations, Vision  WFL;for purposes of eval  -MP        Precautions/Limitations, Hearing  WFL;other (see comments)  -MP        Prior Level of Function-Communication  WFL  -MP        Prior Level of Function-Swallowing  esophageal concerns  -MP        Plans/Goals Discussed with  patient;agreed upon  -MP        Barriers to Rehab  none identified  -MP        Patient's Goals for Discharge  patient did not state  -MP           Pain Assessment    Additional Documentation  Pain Scale: FACES Pre/Post-Treatment (Group)  -MP           Pain Scale: FACES Pre/Post-Treatment    Pain: FACES Scale, " Pretreatment  0-->no hurt  -MP        Pain: FACES Scale, Post-Treatment  0-->no hurt  -MP           Oral Motor and Function    Dentition Assessment  natural, present and adequate  -MP        Secretion Management  WNL/WFL  -MP        Mucosal Quality  moist, healthy  -MP           Oral Musculature and Cranial Nerve Assessment    Oral Motor General Assessment  WFL  -MP           General Eating/Swallowing Observations    Respiratory Support Currently in Use  nasal cannula  -MP        Eating/Swallowing Skills  self-fed;fed by SLP  -MP        Positioning During Eating  upright in bed  -MP        Utensils Used  spoon;cup;straw  -MP        Consistencies Trialed  thin liquids;pureed  -MP           Clinical Swallow Eval    Pharyngeal Phase  suspected pharyngeal impairment  -MP        Esophageal Phase  suspected esophageal impairment  -MP        Clinical Swallow Evaluation Summary  Clinical swallow evaluation completed. Pt given trials of ice chipx1, thin via tsp/cup/straw, & puree. DNT solid 2' current CL restriction. Belching & throat clearing following trials of thin liquid. Pt reports this happens frequently. Possibly related to known hx of esophageal issues. However, given pt's report of PNA & some difficulty swallowing, in agreement to complete MBS to further assess swallow function. Discussed w/ RN.  -MP           Pharyngeal Phase Concerns    Pharyngeal Phase Concerns  throat clear  -MP        Throat Clear  thin  -MP           Esophageal Phase Concerns    Esophageal Phase Concerns  belching  -MP        Belching  thin  -MP           Clinical Impression    SLP Swallowing Diagnosis  suspected pharyngeal dysfunction  -MP        Functional Impact  risk of aspiration/pneumonia  -MP        Rehab Potential/Prognosis, Swallowing  good, to achieve stated therapy goals  -MP        Swallow Criteria for Skilled Therapeutic Interventions Met  demonstrates skilled criteria  -MP           Recommendations    SLP Diet Recommendation   other (see comments) continue MD ordered diet until MBS  -MP        Recommended Diagnostics  VFSS (MBS)  -MP        Recommended Precautions and Strategies  upright posture during/after eating;small bites of food and sips of liquid  -MP        SLP Rec. for Method of Medication Administration  meds whole;meds crushed;with pudding or applesauce  -MP        Monitor for Signs of Aspiration  yes;notify SLP if any concerns  -MP        Anticipated Dischage Disposition  unknown  -          User Key  (r) = Recorded By, (t) = Taken By, (c) = Cosigned By    Initials Name Effective Dates    Rafita Johnson MS CCC-SLP 06/19/19 -           EDUCATION  The patient has been educated in the following areas:   Dysphagia (Swallowing Impairment).    SLP Recommendation and Plan  SLP Swallowing Diagnosis: suspected pharyngeal dysfunction  SLP Diet Recommendation: other (see comments)(continue MD ordered diet until [facility])  Recommended Precautions and Strategies: upright posture during/after eating, small bites of food and sips of liquid  SLP Rec. for Method of Medication Administration: meds whole, meds crushed, with pudding or applesauce     Monitor for Signs of Aspiration: yes, notify SLP if any concerns  Recommended Diagnostics: VFSS (MBS)  Swallow Criteria for Skilled Therapeutic Interventions Met: demonstrates skilled criteria  Anticipated Dischage Disposition: unknown  Rehab Potential/Prognosis, Swallowing: good, to achieve stated therapy goals                         Time Calculation:   Time Calculation- SLP     Row Name 03/20/20 1204             Time Calculation- SLP    SLP Start Time  1050  -MP      SLP Received On  03/20/20  -        User Key  (r) = Recorded By, (t) = Taken By, (c) = Cosigned By    Initials Name Provider Type    Rafita Johnson MS CCC-SLP Speech and Language Pathologist          Therapy Charges for Today     Code Description Service Date Service Provider Modifiers Qty    [id number] hospitals ORAL  PHARYNG SWALLOW 3 3/20/2020 Rafita Pang, MS CCC-SLP GN 1               Rafita Pang, MS [place]-SLP  3/20/2020

## 2020-03-21 VITALS
SYSTOLIC BLOOD PRESSURE: 131 MMHG | RESPIRATION RATE: 18 BRPM | TEMPERATURE: 97.4 F | HEIGHT: 65 IN | BODY MASS INDEX: 20.99 KG/M2 | HEART RATE: 81 BPM | DIASTOLIC BLOOD PRESSURE: 72 MMHG | WEIGHT: 126 LBS | OXYGEN SATURATION: 91 %

## 2020-03-21 PROBLEM — E87.20 LACTIC ACIDOSIS: Status: RESOLVED | Noted: 2020-03-18 | Resolved: 2020-03-21

## 2020-03-21 PROBLEM — A41.9 SEPSIS, UNSPECIFIED ORGANISM: Status: RESOLVED | Noted: 2020-03-18 | Resolved: 2020-03-21

## 2020-03-21 LAB
ANION GAP SERPL CALCULATED.3IONS-SCNC: 8 MMOL/L (ref 5–15)
BACTERIA SPEC AEROBE CULT: ABNORMAL
BACTERIA SPEC AEROBE CULT: ABNORMAL
BUN BLD-MCNC: 24 MG/DL (ref 8–23)
BUN/CREAT SERPL: 36.9 (ref 7–25)
CALCIUM SPEC-SCNC: 9.2 MG/DL (ref 8.6–10.5)
CHLORIDE SERPL-SCNC: 106 MMOL/L (ref 98–107)
CO2 SERPL-SCNC: 28 MMOL/L (ref 22–29)
CREAT BLD-MCNC: 0.65 MG/DL (ref 0.57–1)
DEPRECATED RDW RBC AUTO: 62.2 FL (ref 37–54)
ERYTHROCYTE [DISTWIDTH] IN BLOOD BY AUTOMATED COUNT: 18.1 % (ref 12.3–15.4)
GFR SERPL CREATININE-BSD FRML MDRD: 90 ML/MIN/1.73
GLUCOSE BLD-MCNC: 120 MG/DL (ref 65–99)
GRAM STN SPEC: ABNORMAL
HCT VFR BLD AUTO: 32.2 % (ref 34–46.6)
HGB BLD-MCNC: 9.6 G/DL (ref 12–15.9)
MAGNESIUM SERPL-MCNC: 1.9 MG/DL (ref 1.6–2.4)
MCH RBC QN AUTO: 28.2 PG (ref 26.6–33)
MCHC RBC AUTO-ENTMCNC: 29.8 G/DL (ref 31.5–35.7)
MCV RBC AUTO: 94.4 FL (ref 79–97)
PLATELET # BLD AUTO: 607 10*3/MM3 (ref 140–450)
PMV BLD AUTO: 10.4 FL (ref 6–12)
POTASSIUM BLD-SCNC: 4.6 MMOL/L (ref 3.5–5.2)
RBC # BLD AUTO: 3.41 10*6/MM3 (ref 3.77–5.28)
SODIUM BLD-SCNC: 142 MMOL/L (ref 136–145)
WBC NRBC COR # BLD: 19.03 10*3/MM3 (ref 3.4–10.8)

## 2020-03-21 PROCEDURE — 85027 COMPLETE CBC AUTOMATED: CPT | Performed by: INTERNAL MEDICINE

## 2020-03-21 PROCEDURE — 99239 HOSP IP/OBS DSCHRG MGMT >30: CPT | Performed by: INTERNAL MEDICINE

## 2020-03-21 PROCEDURE — 63710000001 PREDNISONE PER 1 MG: Performed by: INTERNAL MEDICINE

## 2020-03-21 PROCEDURE — 25010000002 ENOXAPARIN PER 10 MG: Performed by: INTERNAL MEDICINE

## 2020-03-21 PROCEDURE — 25010000002 PIPERACILLIN SOD-TAZOBACTAM PER 1 G: Performed by: INTERNAL MEDICINE

## 2020-03-21 PROCEDURE — 83735 ASSAY OF MAGNESIUM: CPT | Performed by: INTERNAL MEDICINE

## 2020-03-21 PROCEDURE — 94799 UNLISTED PULMONARY SVC/PX: CPT

## 2020-03-21 PROCEDURE — 80048 BASIC METABOLIC PNL TOTAL CA: CPT | Performed by: INTERNAL MEDICINE

## 2020-03-21 RX ORDER — METOPROLOL TARTRATE 50 MG/1
50 TABLET, FILM COATED ORAL 2 TIMES DAILY
Start: 2020-03-21

## 2020-03-21 RX ORDER — DOXYCYCLINE 100 MG/1
100 CAPSULE ORAL 2 TIMES DAILY
Qty: 4 CAPSULE | Refills: 0 | Status: SHIPPED | OUTPATIENT
Start: 2020-03-21 | End: 2020-03-23

## 2020-03-21 RX ORDER — NYSTATIN 100000 [USP'U]/G
POWDER TOPICAL EVERY 12 HOURS SCHEDULED
Qty: 60 G | Refills: 0 | Status: SHIPPED | OUTPATIENT
Start: 2020-03-21 | End: 2020-03-28

## 2020-03-21 RX ORDER — ACETAMINOPHEN 325 MG/1
650 TABLET ORAL EVERY 6 HOURS PRN
Start: 2020-03-21 | End: 2020-05-22

## 2020-03-21 RX ORDER — AMOXICILLIN AND CLAVULANATE POTASSIUM 875; 125 MG/1; MG/1
1 TABLET, FILM COATED ORAL 2 TIMES DAILY
Qty: 4 TABLET | Refills: 0 | Status: SHIPPED | OUTPATIENT
Start: 2020-03-21 | End: 2020-03-23

## 2020-03-21 RX ORDER — PREDNISONE 20 MG/1
TABLET ORAL
Qty: 9 TABLET | Refills: 0 | Status: SHIPPED | OUTPATIENT
Start: 2020-03-21 | End: 2020-05-22

## 2020-03-21 RX ADMIN — PREDNISONE 40 MG: 20 TABLET ORAL at 09:09

## 2020-03-21 RX ADMIN — ENOXAPARIN SODIUM 40 MG: 40 INJECTION SUBCUTANEOUS at 05:19

## 2020-03-21 RX ADMIN — BUDESONIDE 0.5 MG: 0.5 INHALANT RESPIRATORY (INHALATION) at 07:35

## 2020-03-21 RX ADMIN — TAZOBACTAM SODIUM AND PIPERACILLIN SODIUM 3.38 G: 375; 3 INJECTION, SOLUTION INTRAVENOUS at 05:18

## 2020-03-21 RX ADMIN — LIDOCAINE 1 PATCH: 50 PATCH CUTANEOUS at 14:33

## 2020-03-21 RX ADMIN — METOPROLOL TARTRATE 50 MG: 25 TABLET, FILM COATED ORAL at 09:10

## 2020-03-21 RX ADMIN — DOXYCYCLINE 100 MG: 100 INJECTION, POWDER, LYOPHILIZED, FOR SOLUTION INTRAVENOUS at 09:10

## 2020-03-21 RX ADMIN — PANTOPRAZOLE SODIUM 40 MG: 40 TABLET, DELAYED RELEASE ORAL at 05:18

## 2020-03-21 RX ADMIN — NYSTATIN: 100000 POWDER TOPICAL at 09:09

## 2020-03-21 RX ADMIN — FLUTICASONE PROPIONATE 2 SPRAY: 50 SPRAY, METERED NASAL at 09:09

## 2020-03-21 RX ADMIN — SODIUM CHLORIDE, PRESERVATIVE FREE 10 ML: 5 INJECTION INTRAVENOUS at 09:11

## 2020-03-21 NOTE — DISCHARGE SUMMARY
[facility] Medicine Services  DISCHARGE SUMMARY    Patient Name: Ita Pickett  : 1950  MRN: [id number]    Date of Admission: 3/18/2020  5:00 PM  Date of Discharge:  3/21/2020  Primary Care Physician: Lucho Peters MD    Consults     Date and Time Order Name Status Description    3/19/2020 0920 Inpatient Gastroenterology Consult Completed     3/18/2020 2245 Inpatient Cardiology Consult Completed           Hospital Course     Presenting Problem:   Dyspnea, unspecified type [R06.00]    Active Hospital Problems    Diagnosis  POA   • **Pain around PEG tube site [T85.848A]  Yes   • Dyspnea [R06.00]  Yes   • SVT (supraventricular tachycardia) (CMS/HCC) [I47.1]  Yes   • Pericardial effusion [I31.3]  Yes   • Hodgkin lymphoma (CMS/HCC) [C81.90]  Yes   • Breast cancer (CMS/HCC) [C50.919]  Yes   • History of lung cancer [Z85.118]  Not Applicable   • COPD (chronic obstructive pulmonary disease) (CMS/HCC) [J44.9]  Yes      Resolved Hospital Problems    Diagnosis Date Resolved POA   • Sepsis, unspecified organism (CMS/HCC) [A41.9] 2020 Yes   • Lactic acidosis [E87.2] 2020 Yes          Hospital Course:  Ita Pickett is a 69 y.o. female with COPD, multiple primary malignancies (breast, lung, ovarian, Hodgkin's), she had a chronic PEG tube removed about 1 week ago along with esophageal stricture dilated by Dr. Mcintosh and then had drainage from the site since then.  She was admitted with dyspnea and found to have possible LLL pneumonia with COPD exacerbation.  She improved with IV zosyn, doxycycline and steroids.  She will complete steroid taper and short antibiotic course at discharge.  Due to desaturation with ambulation, she will have home oxygen arranged at discharge.  She did have SVT and A-fib during admission and was evaluated by cardiology.  She will continue metoprolol BID at discharge and follow up with Dr. Leroy outpatient to discuss possibility of  anticoagulation in the future.  Dr. Mcintosh performed EGD with APC and clip closure of defect from prior PEG tube.  She will follow up with him for EGD in 2-3 months.    Discharge Follow Up Recommendations for outpatient labs/diagnostics:  Follow up with PCP 1-2 weeks  Follow up with Dr. Leroy ~6 weeks  Follow up with Dr. Mcintosh 2-3 months    Day of Discharge     HPI:   Feeling better today.  Wants to go home.    Review of Systems  Gen- No fevers, chills  CV- No chest pain, palpitations  Resp- Improved cough, dyspnea  GI- No N/V/D, abd pain    Vital Signs:   Temp:  [97.4 °F (36.3 °C)-98 °F (36.7 °C)] 97.4 °F (36.3 °C)  Heart Rate:  [70-99] 81  Resp:  [16-18] 18  BP: (121-142)/(67-83) 131/72     Physical Exam:  Constitutional: No acute distress, awake, alert, sitting up in bed  HENT: NCAT, mucous membranes moist  Respiratory: Improved wheezing from yesterday, respiratory effort normal   Cardiovascular: RRR, no murmurs, rubs, or gallops  Gastrointestinal: Positive bowel sounds, soft, nontender, nondistended  Musculoskeletal: No bilateral ankle edema  Psychiatric: Appropriate affect, cooperative  Neurologic: Cranial Nerves grossly intact to confrontation, speech clear  Skin: No rashes    Pertinent  and/or Most Recent Results     Results from last 7 days   Lab Units 03/21/20  0905 03/21/20  0651 03/20/20  0651 03/19/20  1645 03/19/20  0836 03/18/20  1632   WBC 10*3/mm3 19.03*  --  24.51*  --  14.56* 16.50*   HEMOGLOBIN g/dL 9.6*  --  8.9*  --  9.7* 10.3*   HEMATOCRIT % 32.2*  --  29.8*  --  31.7* 33.0*   PLATELETS 10*3/mm3 607*  --  573*  --  538* 579*   SODIUM mmol/L  --  142 142  --  139 137   POTASSIUM mmol/L  --  4.6 3.9 4.3 3.5 4.5   CHLORIDE mmol/L  --  106 107  --  100 91*   CO2 mmol/L  --  28.0 25.0  --  21.0* 30.0*   BUN mg/dL  --  24* 24*  --  18 21   CREATININE mg/dL  --  0.65 0.63  --  0.59 0.87   GLUCOSE mg/dL  --  120* 133*  --  174* 144*   CALCIUM mg/dL  --  9.2 8.4*  --  8.3* 9.3     Results  from last 7 days   Lab Units 03/19/20  0836 03/18/20  1632   BILIRUBIN mg/dL 0.3 0.7   ALK PHOS U/L 103 125*   ALT (SGPT) U/L 9 9   AST (SGOT) U/L 15 22           Invalid input(s): TG, LDLCALC, LDLREALC  Results from last 7 days   Lab Units 03/19/20  0836 03/19/20  0100 03/18/20 2106 03/18/20  1632   TSH uIU/mL 0.036*  --   --   --    PROBNP pg/mL  --   --   --  2,089.0*   TROPONIN T ng/mL  --   --   --  <0.010   PROCALCITONIN ng/mL  --   --  0.37*  --    LACTATE mmol/L 1.2 2.3* 4.6*  --        Brief Urine Lab Results     None          Microbiology Results Abnormal     Procedure Component Value - Date/Time    Wound Culture - Wound, Stomach [[id number]]  (Abnormal) Collected:  03/19/20 0323    Lab Status:  Final result Specimen:  Wound from Stomach Updated:  03/21/20 0900     Wound Culture Light growth (2+) Candida albicans      Light growth (2+) Normal Skin Cami     Gram Stain No WBCs or organisms seen    Blood Culture - Blood, Arm, Left [[id number]] Collected:  03/18/20 2106    Lab Status:  Preliminary result Specimen:  Blood from Arm, Left Updated:  03/20/20 2130     Blood Culture No growth at 2 days    Blood Culture - Blood, Hand, Right [[id number]] Collected:  03/18/20 2106    Lab Status:  Preliminary result Specimen:  Blood from Hand, Right Updated:  03/20/20 2130     Blood Culture No growth at 2 days    MRSA Screen, PCR - Swab, Nares [[id number]]  (Normal) Collected:  03/19/20 1136    Lab Status:  Final result Specimen:  Swab from Nares Updated:  03/19/20 1311     MRSA PCR Negative    Narrative:       MRSA Negative    Respiratory Panel, PCR - Swab, Nasopharynx [[id number]]  (Normal) Collected:  03/18/20 2129    Lab Status:  Final result Specimen:  Swab from Nasopharynx Updated:  03/19/20 0100     ADENOVIRUS, PCR Not Detected     Coronavirus 229E Not Detected     Coronavirus HKU1 Not Detected     Coronavirus NL63 Not Detected     Coronavirus OC43 Not Detected     Human Metapneumovirus Not Detected     Human  Rhinovirus/Enterovirus Not Detected     Influenza B PCR Not Detected     Parainfluenza Virus 1 Not Detected     Parainfluenza Virus 2 Not Detected     Parainfluenza Virus 3 Not Detected     Parainfluenza Virus 4 Not Detected     Bordetella pertussis pcr Not Detected     Influenza A H1 2009 PCR Not Detected     Chlamydophila pneumoniae PCR Not Detected     Mycoplasma pneumo by PCR Not Detected     Influenza A PCR Not Detected     Influenza A H3 Not Detected     Influenza A H1 Not Detected     RSV, PCR Not Detected     Bordetella parapertussis PCR Not Detected    Narrative:       The coronavirus on the RVP is NOT COVID-19 and is NOT indicative of infection with COVID-19.           Imaging Results (All)     Procedure Component Value Units Date/Time    FL Video Swallow With Speech Single Contrast [[id number]] Collected:  03/20/20 1318     Updated:  03/20/20 1542    Narrative:       EXAMINATION: FL VIDEO SWALLOW W SPEECH SINGLE-CONTRAST-     INDICATION: r/o aspiration; R06.00-Dyspnea, unspecified; R91.8-Other  nonspecific abnormal finding of lung field; I47.1-Supraventricular  tachycardia; D72.829-Elevated white blood cell count, unspecified;  T85.848A-Pain due to other internal prosthetic devices, implants and  grafts, initial encounter; T85.848S-Pain due to other internal  prosthetic devices, implants and grafts, sequela     TECHNIQUE: 54 seconds of fluoroscopic time was used for this exam. 1  associated image was saved. The patient was evaluated in the seated  lateral position while taking a variety of consistencies of barium by  mouth under the direction of speech pathology.     COMPARISON: NONE     FINDINGS: There was aspiration with sips of thin barium. There was no  penetration and no aspiration with nectar, pudding, or solid consistency  barium.          Impression:       Fluoroscopy provided for a modified barium swallow. Please  see speech therapy report for full details and recommendations.         This report  was finalized on 3/20/2020 3:39 PM by Dr. Carroll Ramirez.       CT Chest Without Contrast [[id number]] Collected:  03/18/20 2112     Updated:  03/18/20 2114    Narrative:       CT Chest WO    INDICATION:   69-year-old female with shortness of breath one week. COPD and lung cancer.    TECHNIQUE:   CT of the thorax without IV contrast. Coronal and sagittal reconstructions were obtained.  Radiation dose reduction techniques included automated exposure control or exposure modulation based on body size. Count of known CT and cardiac nuc med studies  performed in previous 12 months: 1.     COMPARISON:   2/26/2020    FINDINGS:  Lungs demonstrates sequela of COPD and there are postoperative changes related to lower lobectomy on the right. Trace amount of pleural fluid bilaterally right greater than left, new compared to the prior study with some probable bibasilar atelectasis. A  6 mm noncalcified nodule in the subpleural left lower lobe is unchanged. A subcentimeter noncalcified nodule in the right upper lobe is also stable. There is linear fibrotic change in the right upper lobe peripherally. No new dense consolidation  identified. Fibrotic changes in the lung apices right more prominent than left are stable and may reflect sequela of radiation therapy given imaging features. Central airways are patent. No pneumothorax.    There is a multinodular right thyroid lobe that is unchanged. The heart is enlarged and there is atherosclerotic change of the aorta. A right pretracheal lymph node measures 7 mm short axis, slightly larger than on the prior study when it measured 4 mm.  A precarinal lymph node now measures 7 mm previously only about 3 mm. There is a prominent subcarinal lymph node stable to larger compared to the prior study as well. There is reflux in the esophagus. There is a new at least moderate to moderately large  pericardial effusion measuring up to 3.1 cm.    Included upper abdomen demonstrates a splenule in the  left upper quadrant and postoperative changes of probable splenectomy. Visualized left kidney is atrophic. No suspicious bone lesion.      Impression:         1. Background emphysematous changes with new trace pleural effusions and probable bibasilar atelectasis rather than faint bibasilar pneumonia.  2. New moderate to moderately large pericardial effusion measuring up to 3.1 cm maximum thickness.  3. Probable sequela of radiation therapy in the lung apices and right lower lobectomy. Indeterminate noncalcified nodules in the right upper lobe and left lower lobe are unchanged.  4. Stable to slight interval enlargement of mediastinal lymph nodes, nonspecific and should be correlated clinically given the history of malignancy.  5. Gastroesophageal reflux.    Signer Name: Mario Alberto Heller MD   Signed: 3/18/2020 9:12 PM   Workstation [facility]: [facility]    [facility]    XR Chest 1 View [[id number]] Collected:  03/18/20 2007     Updated:  03/18/20 2010    Narrative:       CR Chest 1 Vw    INDICATION:   69-year-old female with shortness of breath 2 weeks. Worsening symptoms of past 4 days. Recent pneumonia COPD. Partial pneumonectomy on the right.     COMPARISON:    11/6/2018 and CT chest 2/6/2020    FINDINGS:  Single portable AP view(s) of the chest.  Metallic clips project over the left hemidiaphragm. Cardiac silhouette borderline enlarged and stable. The lungs demonstrate sequela of COPD with multifocal scarring and fibrotic change. Persistent area of less  prominent fibrosis in the midlung zone on the right along with curvilinear calcifications in the right lung apex. Chronic right-sided volume loss and probable pleural reaction accounting for blunting of the right CP angle. There is new left basilar  atelectasis or less likely faint infiltrate. No pneumothorax.      Impression:         1. Left basilar atelectasis. Faint infiltrate. Chronic COPD and postoperative changes on the right. No  pneumothorax.    Signer Name: Mario Alberto Heller MD   Signed: 3/18/2020 8:07 PM   Workstation Name: [facility] VegasGemini Mobile TechnologiesC[facility]-    [facility]                    Results for orders placed during the hospital encounter of 03/18/20   STAT Adult Transthoracic Echo Complete W/ Cont if Necessary Per Protocol    Narrative · Left ventricular systolic function is normal. Estimated EF = 65%.  · Left ventricular wall thickness is consistent with mild concentric   hypertrophy.  · There is calcification of the aortic valve.  · There is at least moderate aortic valve regurgitation is present.   Quantification is complicated by the presence of atrial fibrillation.  · Mild mitral valve regurgitation is present  · Moderate to severe tricuspid valve regurgitation is present.  · Estimated right ventricular systolic pressure from tricuspid   regurgitation is moderately elevated (45-55 mmHg).  · There is a moderate circumferential pericardial effusion with a maximal   diameter of 2.3 cm that is predominantly located adjacent to the right   ventricle. There was no evidence of cardiac tamponade. The right ventricle   did not demonstrate diastolic collapse. The respiratory variation was 17%   across the mitral valve and 29% across the tricuspid valve.          Plan for Follow-up of Pending Labs/Results: Will notify patient if further results require change in management   Order Current Status    Blood Culture - Blood, Arm, Left Preliminary result    Blood Culture - Blood, Hand, Right Preliminary result        Discharge Details        Discharge Medications      New Medications      Instructions Start Date   acetaminophen 325 MG tablet  Commonly known as:  TYLENOL   650 mg, Oral, Every 6 Hours PRN      amoxicillin-clavulanate 875-125 MG per tablet  Commonly known as:  Augmentin   1 tablet, Oral, 2 Times Daily      doxycycline 100 MG capsule  Commonly known as:  MONODOX   100 mg, Oral, 2 Times Daily      nystatin 816618 UNIT/GM  powder  Commonly known as:  MYCOSTATIN   Topical, Every 12 Hours Scheduled      predniSONE 20 MG tablet  Commonly known as:  DELTASONE   Take 2 tablets daily x 2 days followed by 1 tablet daily x 3 days, then 0.5 tablet daily x 4 days.         Changes to Medications      Instructions Start Date   metoprolol tartrate 50 MG tablet  Commonly known as:  LOPRESSOR  What changed:  when to take this   50 mg, Oral, 2 Times Daily         Continue These Medications      Instructions Start Date   budesonide 0.25 MG/2ML nebulizer solution  Commonly known as:  PULMICORT   Nebulization, Daily - RT      diazePAM 5 MG tablet  Commonly known as:  VALIUM   if needed.      fluticasone 50 MCG/ACT nasal spray  Commonly known as:  Flonase   2 sprays, Nasal, 2 Times Daily      levocetirizine 5 MG tablet  Commonly known as:  Xyzal   5 mg, Oral, Every Evening      montelukast 10 MG tablet  Commonly known as:  SINGULAIR   TAKE ONE TABLET BY MOUTH EVERY NIGHT      omeprazole 20 MG capsule  Commonly known as:  priLOSEC   20 mg, Oral, Daily      traZODone 100 MG tablet  Commonly known as:  DESYREL   100 mg, Oral, As Needed      TRELEGY ELLIPTA IN   Inhalation, Daily      vitamin C 500 MG tablet  Commonly known as:  ASCORBIC ACID   500 mg, Oral, 2 Times Daily         Stop These Medications    ibuprofen 100 MG/5ML suspension  Commonly known as:  ADVIL,MOTRIN            No Known Allergies      Discharge Disposition:  Home or Self Care    Diet:  Hospital:  Diet Order   Procedures   • Diet Clear Liquid; Nectar / Syrup Thick          CODE STATUS:    Code Status and Medical Interventions:   Ordered at: 03/18/20 2140     Limited Support to NOT Include:    Vasopressors    Intubation    Cardioversion/Defibrillation     Code Status:    No CPR     Medical Interventions (Level of Support Prior to Arrest):    Limited     Comments:    d/w patient, she has a living will reflective of this       Future Appointments   Date Time Provider Department Center    4/22/2020 11:30 AM [facility] None   4/22/2020 12:00 PM Gage Collier MD [facility] None   6/11/2020  9:00 AM Felice Mcintosh MD [facility] None       Additional Instructions for the Follow-ups that You Need to Schedule     Discharge Follow-up with PCP   As directed       Currently Documented PCP:    Lucho Peters MD    PCP Phone Number:    [phone]     Follow Up Details:  1-2 weeks.         Discharge Follow-up with Specified Provider: Dr. Leroy, Cardiology; 6 Weeks   As directed      To:  Dr. Leroy, Cardiology    Follow Up:  6 Weeks               Time Spent on Discharge:  33 minutes    Electronically signed by Yasmeen Beth MD, 03/21/20, 12:24 PM.

## 2020-03-21 NOTE — PROGRESS NOTES
Case Management Discharge Note      Final Note: New O2 orders called/faxed to Francisco at [facility].  He will deliver a portable tank to pt's room prior to DC today.    Provided Post Acute Provider List?: Refused  Refused Provider List Comment: Pt has used [facility] in the past and wants to use them again    Destination      No service has been selected for the patient.      Durable Medical Equipment - Selection Complete      Service Provider Request Status Selected Services Address Phone Number Fax Number    [facility] Selected Durable Medical Equipment [address] [address] [phone]      Dialysis/Infusion      No service has been selected for the patient.      Home Medical Care      No service has been selected for the patient.      Therapy      No service has been selected for the patient.      Community Resources      No service has been selected for the patient.             Final Discharge Disposition Code: 01 - home or self-care

## 2020-03-21 NOTE — PLAN OF CARE
Problem: Patient Care Overview  Goal: Plan of Care Review  Outcome: Ongoing (interventions implemented as appropriate)  Flowsheets  Taken 3/21/2020 0218 by Aleah Jackson, RN  Progress: improving  Taken 3/21/2020 1357 by Gilda Patel RN  Plan of Care Reviewed With: patient;family  Outcome Summary: Pt expressing desire to get out of hospital. Small O2 ordered to go home w patient for travel and when walking.

## 2020-03-21 NOTE — DISCHARGE PLACEMENT REQUEST
"Markus Bolaños (69 y.o. Female)     Date of Birth Social Security Number Address Home Phone MRN    1950  [address] [facility] [address] [phone] [id number]    Taoist Marital Status          Alevism        Admission Date Admission Type Admitting Provider Attending Provider Department, Room/Bed    3/18/20 Emergency Yasmeen Beth MD Brown, Hannah, MD [facility] 2F, S207/1    Discharge Date Discharge Disposition Discharge Destination                       Attending Provider:  Yasmeen Beth MD    Allergies:  No Known Allergies    Isolation:  None   Infection:  None   Code Status:  No CPR    Ht:  165 cm (64.96\")   Wt:  57.2 kg (126 lb)    Admission Cmt:  None   Principal Problem:  Pain around PEG tube site [T85.848A]                 Active Insurance as of 3/18/2020     Primary Coverage     Payor Plan Insurance Group Employer/Plan Group    MEDICARE MEDICARE A & B      Payor Plan Address Payor Plan Phone Number Payor Plan Fax Number Effective Dates     [id number] [phone]  12/1/2014 - None Entered    [facility] [id number]       Subscriber Name Subscriber Birth Date Member ID       MARKUS BOLAÑOS 1950 [id number]           Secondary Coverage     Payor Plan Insurance Group Employer/Plan Group    [employer] [place] [employer] [place]      Payor Plan Address Payor Plan Phone Number Payor Plan Fax Number Effective Dates    [address] [employer] [place] [address] [phone]  12/1/2014 - None Entered    [facility] [id number]       Subscriber Name Subscriber Birth Date Member ID       MARKUS BOLAÑOS 1950 [id number]                 Emergency Contacts      (Rel.) Home Phone Work Phone Mobile Phone    AnithaIrwinDelores (Sister) [phone] -- [phone]    Sylvain Jansen (Friend) [phone] -- --                  [address]  [address] [facility] [address]  Dept. Phone:  [phone]  Dept. Fax:   Date Ordered: Mar 21, 2020 " "        Patient:  Ita Pickett MRN:  [id number]   [address] [facility] [place] [address] :  1950  SSN:    Phone: [phone] Sex:  F     Weight: 57.2 kg (126 lb)         Ht Readings from Last 1 Encounters:   20 165 cm (64.96\")         Oxygen Therapy         (Order ID: [id number])    Diagnosis:  Chronic obstructive pulmonary disease, unspecified COPD type (CMS/HCC) (J44.9 [ICD-10-CM] 496 [ICD-9-CM])   Quantity:  1     Delivery Modality: Nasal Cannula  Liters Per Minute: 2  Duration: With Exertion  Equipment:  Oxygen Concentrator &  &  Portable Gaseous Oxygen System & Portable Oxygen Contents Gaseous  Length of Need (99 Months = Lifetime): 12 Months        Authorizing Provider's Phone: [phone]   Verbal Order Mode: Verbal with readback   Authorizing Provider: Yasmeen Beth MD  Authorizing Provider's NPI: [id number]     Order Entered By: Suzanne Doll RN 3/21/2020 12:04 PM     Electronically signed by: Yasmeen Beth MD 3/21/2020 12:07 PM                  20 1124  --  --  --  --  room air;other (see comments)   --  91   Device (Oxygen Therapy): Lying in bed. at 20 1124   20 1123  --  --  --  --  room air;other (see comments)   --  81Abnormal    Device (Oxygen Therapy): Walking 50 ft at 20 1123   20 1100  --  76  --  --  room air;other (see comments)   --  89Abnormal    Device (Oxygen Therapy): Lying down in bed. at 20 1100            History & Physical      Maria Victoria Fajardo MD at 20 2042              [facility] Medicine Services  HISTORY AND PHYSICAL    Patient Name: Ita Pickett  : 1950  MRN: [id number]  Primary Care Physician: Lucho Peters MD  Date of admission: 3/18/2020      Subjective   Subjective     Chief Complaint:  Shortness of breath x several weeks  Sepsis  Lactic acidosis    HPI:  Itaburton Pikcett is a 69 y.o. female with history of COPD, history of four " primary malignancies- breat, ovarian, hodgkins, lung who presents today with shortness of breath x several weeks. Patient reported to be diagnosed with the flu and bronchitis and was treated at [facility] in [place] in Jan. Improved but then subsequently developed PNA and treated with abx at [facility] in Feb. Patient reportedly had a CT chest 2/6 that only showed chronic disease and did not show a pneumonia.  She was evaluated on the fourth of this month, March 2020, by her pulmonologist, Dr. Collier, who felt the patient was suffering from COPD exacerbation with confounding asthma. Medications were adjusted at this time, however patient has continued to feel more short of breath since this change. Patient reports seeing her PCP earlier this week and given her continued symptoms, PCP instructed patient to seek further care in ED. Denies fevers, but patient does endorse night sweats. Reports that her boyfriend has been sick with a cold, but denies any other sick contacts. Denies any chest pain, HA, any other respiratory complaints.     In the ED, patient noted to be tachycardic to as high as 200 with concern of SVT, cardiology, Dr Leroy, was notified by ED who recommended initiating patient on diltazem gtt, which improved her rate to the 120s. ED workup also notable for leukocytosis to 16k . CXR noted to have left basilar atelectasis with ? Faint infiltrate, though CT chest was done without clear evidence of infiltrate and favored atelectasis with small pleural effusions, also noted moderate pericardial effusion.     Also of note, patient reports having recent PEG tube removal with Dr Mcintosh last week, and reports some leakage from this site since removal. Patient denies pain or purulence from this site.       Review of Systems   Gen- No fevers, chills, +night sweats  CV- No chest pain, palpitations  Resp- as above  GI- No N/V/D, abd pain      All other systems reviewed and are negative.     Personal  History     Past Medical History:   Diagnosis Date   • Breast cancer (CMS/HCC)    • Cancer (CMS/HCC)     lung   • COPD (chronic obstructive pulmonary disease) (CMS/HCC)    • Disease of thyroid gland    • Dysrhythmia     tachycardia   • Frequent PVCs     bigeminal pvc   • Hodgkin's disease (CMS/HCC)    • Hypertension    • Lung cancer (CMS/HCC)    • Ovarian cancer (CMS/HCC)        Past Surgical History:   Procedure Laterality Date   • BREAST LUMPECTOMY     • CATARACT EXTRACTION     • CATARACT EXTRACTION W/ INTRAOCULAR LENS IMPLANTW/ TRABECULECTOMY     • COLONOSCOPY      limited due to scar tissue more thatn 10 years ago   • ENDOSCOPY     • GASTROSTOMY FEEDING TUBE INSERTION  2018    [facility]   • HIP ARTHROSCOPY Right 2011    total hip replacement    • HYSTERECTOMY     • LUNG SURGERY  2000   • RETINAL DETACHMENT REPAIR  2015   • SPLENECTOMY  1980   • THYROIDECTOMY  2012    partial   • UPPER GASTROINTESTINAL ENDOSCOPY  2018 x 3    esophageal strictures by [facility]       Family History: family history includes Breast cancer in her maternal grandmother; Cancer in her mother; Heart disease in her brother and father; Lung cancer in her mother. Otherwise pertinent FHx was reviewed and unremarkable.     Social History:  reports that she quit smoking about 9 years ago. Her smoking use included cigarettes. She has a 120.00 pack-year smoking history. She has never used smokeless tobacco. She reports that she drinks about 2.0 standard drinks of alcohol per week. She reports that she has current or past drug history. Drug: Marijuana.  Social History     Social History Narrative   • Not on file       Medications:  Available home medication information reviewed.    (Not in a hospital admission)    No Known Allergies    Objective   Objective     Vital Signs:   Temp:  [98.1 °F (36.7 °C)-98.7 °F (37.1 °C)] 98.7 °F (37.1 °C)  Heart Rate:  [] 141  Resp:  [24] 24  BP: (125-144)/(65-83) 139/80        Physical Exam    GEN- appears chronically ill, resting in bed, on RA   HEENT- atraumatic, normocephlic, eomi  NECK- supple, trachea midline, no masses  RESP: slightly diminished, good effort   CV: tachycardic in mid 120s, no murmurs heard   GI: PEG tube site noted with erythema surrounding  MSK: no edema noted, spontaneous movement of all extremities  NEURO: alert, oriented, no focal deficits  SKIN: no rashes  PSYCH: appropriate mood and affect     Results Reviewed:  I have personally reviewed current lab and radiology data.    Results from last 7 days   Lab Units 03/18/20  1632   WBC 10*3/mm3 16.50*   HEMOGLOBIN g/dL 10.3*   HEMATOCRIT % 33.0*   PLATELETS 10*3/mm3 579*     Results from last 7 days   Lab Units 03/18/20  2106 03/18/20  1632   SODIUM mmol/L  --  137   POTASSIUM mmol/L  --  4.5   CHLORIDE mmol/L  --  91*   CO2 mmol/L  --  30.0*   BUN mg/dL  --  21   CREATININE mg/dL  --  0.87   GLUCOSE mg/dL  --  144*   CALCIUM mg/dL  --  9.3   ALT (SGPT) U/L  --  9   AST (SGOT) U/L  --  22   TROPONIN T ng/mL  --  <0.010   PROBNP pg/mL  --  2,089.0*   LACTATE mmol/L 4.6*  --    PROCALCITONIN ng/mL 0.37*  --      Estimated Creatinine Clearance: 54.3 mL/min (by C-G formula based on SCr of 0.87 mg/dL).  Brief Urine Lab Results     None        Imaging Results (Last 24 Hours)     Procedure Component Value Units Date/Time    CT Chest Without Contrast [[id number]] Collected:  03/18/20 2112     Updated:  03/18/20 2114    Narrative:       CT Chest WO    INDICATION:   69-year-old female with shortness of breath one week. COPD and lung cancer.    TECHNIQUE:   CT of the thorax without IV contrast. Coronal and sagittal reconstructions were obtained.  Radiation dose reduction techniques included automated exposure control or exposure modulation based on body size. Count of known CT and cardiac nuc med studies  performed in previous 12 months: 1.     COMPARISON:   2/26/2020    FINDINGS:  Lungs demonstrates sequela of COPD and there are postoperative  changes related to lower lobectomy on the right. Trace amount of pleural fluid bilaterally right greater than left, new compared to the prior study with some probable bibasilar atelectasis. A  6 mm noncalcified nodule in the subpleural left lower lobe is unchanged. A subcentimeter noncalcified nodule in the right upper lobe is also stable. There is linear fibrotic change in the right upper lobe peripherally. No new dense consolidation  identified. Fibrotic changes in the lung apices right more prominent than left are stable and may reflect sequela of radiation therapy given imaging features. Central airways are patent. No pneumothorax.    There is a multinodular right thyroid lobe that is unchanged. The heart is enlarged and there is atherosclerotic change of the aorta. A right pretracheal lymph node measures 7 mm short axis, slightly larger than on the prior study when it measured 4 mm.  A precarinal lymph node now measures 7 mm previously only about 3 mm. There is a prominent subcarinal lymph node stable to larger compared to the prior study as well. There is reflux in the esophagus. There is a new at least moderate to moderately large  pericardial effusion measuring up to 3.1 cm.    Included upper abdomen demonstrates a splenule in the left upper quadrant and postoperative changes of probable splenectomy. Visualized left kidney is atrophic. No suspicious bone lesion.      Impression:         1. Background emphysematous changes with new trace pleural effusions and probable bibasilar atelectasis rather than faint bibasilar pneumonia.  2. New moderate to moderately large pericardial effusion measuring up to 3.1 cm maximum thickness.  3. Probable sequela of radiation therapy in the lung apices and right lower lobectomy. Indeterminate noncalcified nodules in the right upper lobe and left lower lobe are unchanged.  4. Stable to slight interval enlargement of mediastinal lymph nodes, nonspecific and should be correlated  clinically given the history of malignancy.  5. Gastroesophageal reflux.    Signer Name: Mario Alberto Heller MD   Signed: 3/18/2020 9:12 PM   Workstation Name: [facility]    [facility]    XR Chest 1 View [[id number]] Collected:  03/18/20 2007     Updated:  03/18/20 2010    Narrative:       CR Chest 1 Vw    INDICATION:   69-year-old female with shortness of breath 2 weeks. Worsening symptoms of past 4 days. Recent pneumonia COPD. Partial pneumonectomy on the right.     COMPARISON:    11/6/2018 and CT chest 2/6/2020    FINDINGS:  Single portable AP view(s) of the chest.  Metallic clips project over the left hemidiaphragm. Cardiac silhouette borderline enlarged and stable. The lungs demonstrate sequela of COPD with multifocal scarring and fibrotic change. Persistent area of less  prominent fibrosis in the midlung zone on the right along with curvilinear calcifications in the right lung apex. Chronic right-sided volume loss and probable pleural reaction accounting for blunting of the right CP angle. There is new left basilar  atelectasis or less likely faint infiltrate. No pneumothorax.      Impression:         1. Left basilar atelectasis. Faint infiltrate. Chronic COPD and postoperative changes on the right. No pneumothorax.    Signer Name: Mario Alberto Heller MD   Signed: 3/18/2020 8:07 PM   Workstation Name: [facility]    [facility]             Assessment/Plan   Assessment & Plan     Active Hospital Problems    Diagnosis POA   • Dyspnea [R06.00] Yes   • SVT (supraventricular tachycardia) (CMS/HCC) [I47.1] Unknown   • Pericardial effusion [I31.3] Unknown   • Sepsis, unspecified organism (CMS/HCC) [A41.9] Unknown   • Lactic acidosis [E87.2] Unknown   • Hodgkin lymphoma (CMS/HCC) [C81.90] Yes   • Breast cancer (CMS/HCC) [C50.919] Yes   • History of lung cancer [Z85.118] Not Applicable   • COPD (chronic obstructive pulmonary disease) (CMS/HCC) [J44.9] Yes     Overview:   History:  COPD.  PTA was on Stiolto inhalers  Assessment:   98% on room air  Plan:    - monitor sats  - Spiriva while inhouse  - cough and deep breath, Vibrapep  .         Ita Pickett is a 69 y.o. female with history of COPD, history of four primary malignancies- breast, ovarian, hodgkins, lung who presents today with shortness of breath x several weeks. Patient noted to have findings c/w COPD exacerbation, however further workup notable for SVT with significant tachycardia, leukocytosis, lactic acidosis as well as new pericardial effusion. Patient will be admitted to hospitalist service for further management.    Dyspnea   COPD with acute exacerbation  Leukocytosis  ---will place on broad spectrum abx with vanc/zosyn/doxy given significant elevation of lactate, no evidence of clear PNA on CT imaging  ---continue IV steroids   ---nebs, pulmonary toilet, respiratory viral panel pending  ---low risk for COVID given CT findings and clinical picture c/w COPD exacerbation and dyspnea favored to be exacerbated by cardiac issues below    Sepsis secondary to above   Lactic acidosis   ---multifactorial given significant SVT/tachycardia as well as COPD exac and possible contribution from cellulitis as below  ---trend, monitor closely     SVT  Pericardial effusion  ---cardiology to see formally in AM- d/w Dr Michelle gan and have placed order for STAT ECHO given inability to r/o tamponade physiology and new large pericardial effusion, pending at this time, though patient is Hemodynamically stable  ----gentle IVF at this time   ---continue dilt gtt, oral metoprolol      Recent PEG tube removal with possible surrounding cellulitis  ---noted erythema surrounding site  ---place on abx as above, follow cultures  ---consider involvement of GI-- recently removed by Dr Mcintosh    History of breast, lung, ovarian, Hogdkin's  ---continue close outpatient follow up, noted mediastinal EVAN on CT     DVT prophylaxis:  lovenox    CODE  STATUS:    Code Status and Medical Interventions:   Ordered at: 03/18/20 2140     Limited Support to NOT Include:    Vasopressors    Intubation    Cardioversion/Defibrillation     Code Status:    No CPR     Medical Interventions (Level of Support Prior to Arrest):    Limited     Comments:    d/w patient, she has a living will reflective of this       Admission Status:  I believe this patient meets INPATIENT status due to need for IV abx, close monitoring, subspeciality evaluation.  I feel patient’s risk for adverse outcomes and need for care warrant INPATIENT evaluation and I predict the patient’s care encounter to likely last beyond 2 midnights.      Electronically signed by Maria Victoria Fajrado MD, 03/18/20, 8:42 PM.      Electronically signed by Maria Victoria Fajardo MD at 03/18/20 9982

## 2020-03-21 NOTE — PLAN OF CARE
Problem: Patient Care Overview  Goal: Plan of Care Review  Outcome: Ongoing (interventions implemented as appropriate)  Flowsheets (Taken 3/21/2020 0218)  Progress: improving  Plan of Care Reviewed With: patient  Outcome Summary: VSS. Complaints of discomfort. PRN provided. Will continue to monitor.

## 2020-03-22 ENCOUNTER — READMISSION MANAGEMENT (OUTPATIENT)
Dept: CALL CENTER | Facility: HOSPITAL | Age: 70
End: 2020-03-22

## 2020-03-22 NOTE — OUTREACH NOTE
Prep Survey      Responses   [facility] patient discharged from?  [facility]   Is LACE score < 7 ?  No   Eligibility  Readm Mgmt   Discharge diagnosis  Pain around PEG tube site  LLL pneumonia, COPD   Does the patient have one of the following disease processes/diagnoses(primary or secondary)?  COPD/Pneumonia   Does the patient have Home health ordered?  Yes   What is the Home health agency?   refused HH   Is there a DME ordered?  Yes   What DME was ordered?  [employer] [facility]   Prep survey completed?  Yes          Misty Harden RN

## 2020-03-23 LAB
BACTERIA SPEC AEROBE CULT: NORMAL
BACTERIA SPEC AEROBE CULT: NORMAL

## 2020-03-25 ENCOUNTER — READMISSION MANAGEMENT (OUTPATIENT)
Dept: CALL CENTER | Facility: HOSPITAL | Age: 70
End: 2020-03-25

## 2020-03-25 NOTE — OUTREACH NOTE
COPD/PN Week 1 Survey      Responses   [facility] patient discharged from?  [place]   Does the patient have one of the following disease processes/diagnoses(primary or secondary)?  COPD/Pneumonia   Is there a successful TCM telephone encounter documented?  No   Was the primary reason for admission:  COPD exacerbation   Week 1 attempt successful?  Yes   Call start time  1631   Call end time  1650   Meds reviewed with patient/caregiver?  Yes   Is the patient having any side effects they believe may be caused by any medication additions or changes?  No   Does the patient have all medications ordered at discharge?  Yes   Is the patient taking all medications as directed (includes completed medication regime)?  Yes   Does the patient have a primary care provider?   Yes   Does the patient have an appointment with their PCP or pulmonologist within 7 days of discharge?  No   Comments regarding PCP  PATIENT DOES NOT HAVE A FOLLOW UP APPOINTMENT WITH HER PCP AT THIS TIME DUE TO COVID 19 QUARANTINE   Has the patient kept scheduled appointments due by today?  N/A   Has home health visited the patient within 72 hours of discharge?  N/A   What DME was ordered?  [employer] [facility]   Has all DME been delivered?  Yes   Did the patient receive a copy of their discharge instructions?  Yes   Nursing interventions  Reviewed instructions with patient   What is the patient's perception of their health status since discharge?  Improving   Nursing Interventions  Nurse provided patient education   Is the patient/caregiver able to teach back the hierarchy of who to call/visit for symptoms/problems? PCP, Specialist, Home health nurse, Urgent Care, ED, 911  Yes   Additional teach back comments  PATIENT HAS QUESTIONS ABOUT HER DIET AND WHAT THE SLP RECOMMENDED SHE EAT FOLLOWING HER SWALLOW STUDY AND SPEECH EVALUATION. THIS NURSE SENT EMAIL TO FREDERICK MAYER SLP LEESA LIM, FOR SOME ASSISTANCE IN EDUCATING PATIENT REGARDING APPROPRIATE FOODS  TO EAT.    Is the patient able to teach back COPD zones?  Yes   Nursing interventions  Education provided on various zones   Patient reports what zone on this call?  Green Zone   Green Zone  Reports doing well, Breathing without shortness of breath, Usual activity and exercise level, Usual amount of phlegm/mucus without difficulty coughing up, Sleeping well, Appetite is good   Green Zone interventions:  Take daily medications, Use oxygen as prescribed, Avoid indoor/outdoor triggers, Continue regular exercise/diet plan   Week 1 call completed?  Yes      EMAILRECEIVED FROM SLP WITH ATTACHED INFORMATIVE DOCUMENTS REGARDING APPROPRIATE FOODS FOR PATIENT TO EAT AND REGARDING THICKENING LIQUIDS. CALL TO PATIENT TO DISCUSS BEST WAY TO SHARE THESE DOCUMENTS AND PATIENT STATES SHE ALREADY RECEIVED THEM BY EMAIL FROM SLP.     Marcia Prince LPN

## 2020-04-01 ENCOUNTER — READMISSION MANAGEMENT (OUTPATIENT)
Dept: CALL CENTER | Facility: HOSPITAL | Age: 70
End: 2020-04-01

## 2020-04-01 NOTE — OUTREACH NOTE
COPD/PN Week 2 Survey      Responses   [facility] patient discharged from?  [place]   Does the patient have one of the following disease processes/diagnoses(primary or secondary)?  COPD/Pneumonia   Was the primary reason for admission:  COPD exacerbation   Week 2 attempt successful?  No   Unsuccessful attempts  Attempt 1          Marcia Prince LPN

## 2020-04-02 ENCOUNTER — TELEPHONE (OUTPATIENT)
Dept: GASTROENTEROLOGY | Facility: CLINIC | Age: 70
End: 2020-04-02

## 2020-04-02 RX ORDER — CIPROFLOXACIN 500 MG/1
500 TABLET, FILM COATED ORAL 2 TIMES DAILY
Qty: 14 TABLET | Refills: 0 | Status: SHIPPED | OUTPATIENT
Start: 2020-04-02 | End: 2020-04-09

## 2020-04-02 NOTE — TELEPHONE ENCOUNTER
" on 03/19/2020 performed a EGD with APC and clip closure of defect from prior PEG tube. Patient called today because she was having some clear drainage the last 3-4 days but followed up with her Primary Care physician and  Per the patient her PCP notified her to just keep the site clean and dry but this morning the patient says she noted what looked to be pus \" thick and a creamy color\" drain from the site today.  Patient confirmed she has not had a fever, nausea, vomtining, site isn't warm to the touch, and no abdominal pain. Per  because of the patients past history and drainage we are sending in a prescription for Ciprofloxacin 500 mg take 1 by mouth twice daily for 7 days.   "

## 2020-04-06 ENCOUNTER — READMISSION MANAGEMENT (OUTPATIENT)
Dept: CALL CENTER | Facility: HOSPITAL | Age: 70
End: 2020-04-06

## 2020-04-06 ENCOUNTER — TELEPHONE (OUTPATIENT)
Dept: GASTROENTEROLOGY | Facility: CLINIC | Age: 70
End: 2020-04-06

## 2020-04-06 ENCOUNTER — PREP FOR SURGERY (OUTPATIENT)
Dept: OTHER | Facility: HOSPITAL | Age: 70
End: 2020-04-06

## 2020-04-06 DIAGNOSIS — K31.6 GASTRIC FISTULA: Primary | ICD-10-CM

## 2020-04-06 NOTE — TELEPHONE ENCOUNTER
Called patient back because she left a voicemail.    Patient  is currently having increased drainage from the  Gastric Fistula compared to the last time we talked on 04/02/2020.Patient confirmed that the good news is there is nothing that looks like Pus at this time but when she drinks something with color or depending on what she eats the drainage comes out that color. Notified  and at this time we are scheduling the patient for a repeat Upper GI endoscopy for Gastric fistula for further evaluation and potential clip.      Patient was given the option for a repeat Endoscopy with  or a referral /consultation with a surgeon. At this time patient prefers to repeat the Upper GI Endoscopy with potential clip  prior to referral with a surgeon.     Patient notified scheduling should be contacting her later today to notify her of the date and time of procedure. Patient confirmed she understood and had no additional questions at this time.

## 2020-04-06 NOTE — OUTREACH NOTE
COPD/PN Week 2 Survey      Responses   [facility] patient discharged from?  [place]   COVID-19 Test Status  Not tested   Does the patient have one of the following disease processes/diagnoses(primary or secondary)?  COPD/Pneumonia   Was the primary reason for admission:  COPD exacerbation   Week 2 attempt successful?  No   Unsuccessful attempts  Attempt 2          Delores March LPN     Patient returned call.  She stated that her feeding tube is leaking again and it is getting red to the area.  She has gauze around it to help.  Advised to contact her doctor regarding this due to she states she does not have an appt til May.  She is going to contact them.

## 2020-04-07 ENCOUNTER — ANESTHESIA EVENT (OUTPATIENT)
Dept: GASTROENTEROLOGY | Facility: HOSPITAL | Age: 70
End: 2020-04-07

## 2020-04-07 ENCOUNTER — ANESTHESIA (OUTPATIENT)
Dept: GASTROENTEROLOGY | Facility: HOSPITAL | Age: 70
End: 2020-04-07

## 2020-04-07 ENCOUNTER — HOSPITAL ENCOUNTER (OUTPATIENT)
Facility: HOSPITAL | Age: 70
Setting detail: HOSPITAL OUTPATIENT SURGERY
Discharge: HOME | End: 2020-04-07
Attending: INTERNAL MEDICINE | Admitting: INTERNAL MEDICINE

## 2020-04-07 VITALS
WEIGHT: 119 LBS | SYSTOLIC BLOOD PRESSURE: 121 MMHG | TEMPERATURE: 98.5 F | HEART RATE: 93 BPM | HEIGHT: 65 IN | BODY MASS INDEX: 19.83 KG/M2 | RESPIRATION RATE: 16 BRPM | DIASTOLIC BLOOD PRESSURE: 71 MMHG | OXYGEN SATURATION: 97 %

## 2020-04-07 DIAGNOSIS — K31.6 GASTRIC FISTULA: ICD-10-CM

## 2020-04-07 PROCEDURE — 88305 TISSUE EXAM BY PATHOLOGIST: CPT | Performed by: INTERNAL MEDICINE

## 2020-04-07 PROCEDURE — 43255 EGD CONTROL BLEEDING ANY: CPT | Performed by: INTERNAL MEDICINE

## 2020-04-07 PROCEDURE — C1769 GUIDE WIRE: HCPCS | Performed by: INTERNAL MEDICINE

## 2020-04-07 PROCEDURE — 25010000002 PROPOFOL 10 MG/ML EMULSION: Performed by: NURSE ANESTHETIST, CERTIFIED REGISTERED

## 2020-04-07 PROCEDURE — 43251 EGD REMOVE LESION SNARE: CPT | Performed by: INTERNAL MEDICINE

## 2020-04-07 DEVICE — DEV CLIP ENDO RESOLUTION360 CONTRL ROT 235CM: Type: IMPLANTABLE DEVICE | Site: STOMACH | Status: FUNCTIONAL

## 2020-04-07 DEVICE — SYS CLIP GI OTSC NITNL 12/6T 220CM
Type: IMPLANTABLE DEVICE | Site: STOMACH | Status: NON-FUNCTIONAL
Removed: 2020-05-11

## 2020-04-07 RX ORDER — FENTANYL CITRATE 50 UG/ML
50 INJECTION, SOLUTION INTRAMUSCULAR; INTRAVENOUS
Status: DISCONTINUED | OUTPATIENT
Start: 2020-04-07 | End: 2020-04-07 | Stop reason: HOSPADM

## 2020-04-07 RX ORDER — PROPOFOL 10 MG/ML
VIAL (ML) INTRAVENOUS AS NEEDED
Status: DISCONTINUED | OUTPATIENT
Start: 2020-04-07 | End: 2020-04-07 | Stop reason: SURG

## 2020-04-07 RX ORDER — FAMOTIDINE 10 MG/ML
20 INJECTION, SOLUTION INTRAVENOUS ONCE
Status: COMPLETED | OUTPATIENT
Start: 2020-04-07 | End: 2020-04-07

## 2020-04-07 RX ORDER — SODIUM CHLORIDE, SODIUM LACTATE, POTASSIUM CHLORIDE, CALCIUM CHLORIDE 600; 310; 30; 20 MG/100ML; MG/100ML; MG/100ML; MG/100ML
20 INJECTION, SOLUTION INTRAVENOUS ONCE
Status: COMPLETED | OUTPATIENT
Start: 2020-04-07 | End: 2020-04-07

## 2020-04-07 RX ORDER — SODIUM CHLORIDE, SODIUM LACTATE, POTASSIUM CHLORIDE, CALCIUM CHLORIDE 600; 310; 30; 20 MG/100ML; MG/100ML; MG/100ML; MG/100ML
INJECTION, SOLUTION INTRAVENOUS CONTINUOUS PRN
Status: DISCONTINUED | OUTPATIENT
Start: 2020-04-07 | End: 2020-04-07 | Stop reason: SURG

## 2020-04-07 RX ADMIN — SODIUM CHLORIDE, POTASSIUM CHLORIDE, SODIUM LACTATE AND CALCIUM CHLORIDE 20 ML/HR: 600; 310; 30; 20 INJECTION, SOLUTION INTRAVENOUS at 11:21

## 2020-04-07 RX ADMIN — FAMOTIDINE 20 MG: 10 INJECTION INTRAVENOUS at 11:25

## 2020-04-07 RX ADMIN — SODIUM CHLORIDE, POTASSIUM CHLORIDE, SODIUM LACTATE AND CALCIUM CHLORIDE: 600; 310; 30; 20 INJECTION, SOLUTION INTRAVENOUS at 11:47

## 2020-04-07 RX ADMIN — PROPOFOL 160 MCG/KG/MIN: 10 INJECTION, EMULSION INTRAVENOUS at 11:53

## 2020-04-07 RX ADMIN — PROPOFOL 60 MG: 10 INJECTION, EMULSION INTRAVENOUS at 11:53

## 2020-04-07 NOTE — INTERVAL H&P NOTE
Patient reports ongoing leakage from prior PEG tube site and presents today for endoscopic reassessment  H&P reviewed. The patient was examined and there are no changes to the H&P.

## 2020-04-07 NOTE — OP NOTE
EGD report summary.  See EGD report for details    Previously placed Endo Clip was removed after external wire cannulation of the fistulous tract revealed the previously placed stent to be eccentric from the remaining fistula.  Ovesco clip was then placed after cauterizing this area..  Previously placed and was removed first.  Incidental fundic line polyp was removed with snare/cautery    Impression  Status post clip closure of fistula  Status post snare polypectomy of gastric fundic polyp    Plan  Reassess fistula at return office visit

## 2020-04-07 NOTE — ANESTHESIA PREPROCEDURE EVALUATION
Anesthesia Evaluation     Patient summary reviewed and Nursing notes reviewed   no history of anesthetic complications:  NPO Solid Status: > 8 hours  NPO Liquid Status: > 8 hours           Airway   Mallampati: II  TM distance: >3 FB  Neck ROM: full  No difficulty expected  Dental      Pulmonary - normal exam   (+) lung cancer, COPD, shortness of breath,   Cardiovascular - normal exam    (+) hypertension, dysrhythmias Atrial Fib, pericardial effusion,       Neuro/Psych  GI/Hepatic/Renal/Endo      Musculoskeletal     Abdominal    Substance History      OB/GYN          Other      history of cancer                    Anesthesia Plan    ASA 3     general     intravenous induction     Anesthetic plan, all risks, benefits, and alternatives have been provided, discussed and informed consent has been obtained with: patient.    Plan discussed with CRNA.

## 2020-04-07 NOTE — ANESTHESIA POSTPROCEDURE EVALUATION
Patient: Ita Pickett    Procedure Summary     Date:  04/07/20 Room / Location:   [facility]    Anesthesia Start:  1147 Anesthesia Stop:  1230    Procedure:  ESOPHAGOGASTRODUODENOSCOPY (N/A ) Diagnosis:       Gastric fistula      (Gastric fistula [K31.6])    Surgeon:  Feilce Mcintosh MD Provider:  Jaguar Villasenor MD    Anesthesia Type:  general ASA Status:  3          Anesthesia Type: general    Vitals  Vitals Value Taken Time   /71 4/7/2020 12:29 PM   Temp     Pulse 96 4/7/2020 12:32 PM   Resp     SpO2 94 % 4/7/2020 12:32 PM   Vitals shown include unvalidated device data.        Post Anesthesia Care and Evaluation    Patient location during evaluation: PACU  Patient participation: complete - patient participated  Level of consciousness: awake and anxious  Pain score: 0  Pain management: adequate  Airway patency: patent  Anesthetic complications: No anesthetic complications  PONV Status: none  Cardiovascular status: hemodynamically stable and acceptable  Respiratory status: nonlabored ventilation, acceptable and nasal cannula  Hydration status: acceptable

## 2020-04-09 ENCOUNTER — TELEPHONE (OUTPATIENT)
Dept: GASTROENTEROLOGY | Facility: CLINIC | Age: 70
End: 2020-04-09

## 2020-04-09 NOTE — TELEPHONE ENCOUNTER
Called to check on patient Post Upper GI Endoscopy with Ovesco clip. Patient is doing okay and has not noticed any further leakage from her PEG site. Confirmed I understood and had no additional questions.

## 2020-04-13 ENCOUNTER — READMISSION MANAGEMENT (OUTPATIENT)
Dept: CALL CENTER | Facility: HOSPITAL | Age: 70
End: 2020-04-13

## 2020-04-13 NOTE — OUTREACH NOTE
COPD/PN Week 3 Survey      Responses   [facility] patient discharged from?  [place]   COVID-19 Test Status  Not tested   Does the patient have one of the following disease processes/diagnoses(primary or secondary)?  COPD/Pneumonia   Was the primary reason for admission:  COPD exacerbation   Week 3 attempt successful?  Yes   Call start time  1244   Call end time  1247   Meds reviewed with patient/caregiver?  Yes   Is the patient having any side effects they believe may be caused by any medication additions or changes?  No   Does the patient have all medications ordered at discharge?  Yes   Is the patient taking all medications as directed (includes completed medication regime)?  Yes   Does the patient have a primary care provider?   Yes   Does the patient have an appointment with their PCP or pulmonologist within 7 days of discharge?  Greater than 7 days   Has the patient kept scheduled appointments due by today?  N/A   Has home health visited the patient within 72 hours of discharge?  N/A   What DME was ordered?  [employer]   Has all DME been delivered?  Yes   Did the patient receive a copy of their discharge instructions?  Yes   Nursing interventions  Reviewed instructions with patient   What is the patient's perception of their health status since discharge?  Improving   Nursing Interventions  Nurse provided patient education   Is the patient/caregiver able to teach back the hierarchy of who to call/visit for symptoms/problems? PCP, Specialist, Home health nurse, Urgent Care, ED, 911  Yes   Is the patient able to teach back COPD zones?  Yes   Nursing interventions  Education provided on various zones   Patient reports what zone on this call?  Green Zone   Green Zone  Reports doing well, Breathing without shortness of breath, Usual activity and exercise level, Usual amount of phlegm/mucus without difficulty coughing up, Sleeping well, Appetite is good   Green Zone interventions:  Take daily medications,  Avoid indoor/outdoor triggers, Continue regular exercise/diet plan, Use oxygen as prescribed   Week 3 call completed?  Yes          Marcia Prince LPN

## 2020-04-15 NOTE — TELEPHONE ENCOUNTER
Called to check on patient. Patient has been doing great but she noticed some drainage from the gastric fistula today about the size of a quarter but maybe a little bit bigger. Patient also noted that she ate eggs this morning so it was yellow in color.     Notified  of the information above who at this time recommends that we wait and continue to monitor the patient because it sounds like it is minimal discharge at this time and hopefully the discharge will stop/close up on  its own but if it continues or increases in drainage we will make further recommendations. Patient confirmed she understood and will keep us updated if anything changes

## 2020-04-17 NOTE — TELEPHONE ENCOUNTER
Called patient on 04/17/2020 at 9:22 AM to check on her. Patient noted increased drainage from the gastric fistula. She describe the drainage as clear at this time because she had water earlier.     Notified  of the above information. Per  the patient was given two options. First we could try a silver nitrate stick that would be us to cauterize the opening from the outside or we could go ahead and refer her to  (Surgeon). At this time patient would like to move forward with the Silver nitrate stick.   Confirmed I understood and would be in contact with additional information .

## 2020-04-20 ENCOUNTER — READMISSION MANAGEMENT (OUTPATIENT)
Dept: CALL CENTER | Facility: HOSPITAL | Age: 70
End: 2020-04-20

## 2020-04-20 NOTE — TELEPHONE ENCOUNTER
Called to check on patient. Patient noted she has had increased drainage from when we last check on her on 04/17/2020. Patient confirmed she is negative for a fever or abdominal pain. Patient noted the drainage is still the same color as anything she eat. Confirmed I would let  know and if he had any additional questions we would call her back but at this time keep follow up as scheduled for 04/20/2020.     Called and notified  of the above information. He has no additional questions for the patient at this time and would like her to keep the follow up appointment as scheduled to discuss all of the patients options.

## 2020-04-20 NOTE — OUTREACH NOTE
COPD/PN Week 4 Survey      Responses   [facility] patient discharged from?  [place]   COVID-19 Test Status  Not tested   Does the patient have one of the following disease processes/diagnoses(primary or secondary)?  COPD/Pneumonia   Was the primary reason for admission:  COPD exacerbation   Week 4 attempt successful?  Yes   Call start time  1013   Call end time  1016   Meds reviewed with patient/caregiver?  Yes   Is the patient having any side effects they believe may be caused by any medication additions or changes?  No   Is the patient taking all medications as directed (includes completed medication regime)?  Yes   Has the patient kept scheduled appointments due by today?  Yes   Is the patient still receiving Home Health Services?  N/A   Nursing interventions  Notified PCP/Provider   What is the patient's perception of their health status since discharge?  Improving   Nursing Interventions  Nurse provided patient education   Is the patient/caregiver able to teach back the hierarchy of who to call/visit for symptoms/problems? PCP, Specialist, Home health nurse, Urgent Care, ED, 911  Yes   Is the patient able to teach back COPD zones?  Yes   Nursing interventions  Education provided on various zones   Patient reports what zone on this call?  Green Zone   Green Zone  Reports doing well, Breathing without shortness of breath, Usual activity and exercise level, Usual amount of phlegm/mucus without difficulty coughing up, Sleeping well, Appetite is good   Green Zone interventions:  Take daily medications, Continue regular exercise/diet plan, Avoid indoor/outdoor triggers, Use oxygen as prescribed   Week 4 call completed?  Yes          Marcia Prince LPN

## 2020-04-21 ENCOUNTER — OFFICE VISIT (OUTPATIENT)
Dept: GASTROENTEROLOGY | Facility: CLINIC | Age: 70
End: 2020-04-21

## 2020-04-21 ENCOUNTER — PREP FOR SURGERY (OUTPATIENT)
Dept: OTHER | Facility: HOSPITAL | Age: 70
End: 2020-04-21

## 2020-04-21 DIAGNOSIS — D50.9 IRON DEFICIENCY ANEMIA, UNSPECIFIED IRON DEFICIENCY ANEMIA TYPE: Primary | ICD-10-CM

## 2020-04-21 DIAGNOSIS — K31.6 GASTROCUTANEOUS FISTULA: Primary | ICD-10-CM

## 2020-04-21 DIAGNOSIS — K31.6 ACQUIRED GASTRIC FISTULA: ICD-10-CM

## 2020-04-21 DIAGNOSIS — D75.839 THROMBOCYTOSIS: ICD-10-CM

## 2020-04-21 PROCEDURE — 99214 OFFICE O/P EST MOD 30 MIN: CPT | Performed by: INTERNAL MEDICINE

## 2020-04-21 NOTE — PROGRESS NOTES
GASTROENTEROLOGY TELEMEDICINE OFFICE NOTE  Ita Pickett  [id number]  1950    CARE TEAM  Patient Care Team:  Lucho Peters MD as PCP - General (Family Medicine)  Anatoly Jay MD as PCP - Claims Attributed  Lucho Peters MD as Consulting Physician (Family Medicine)  Anatoly Jay MD as Referring Physician (General Surgery)  Nacho Hanna MD as Consulting Physician (Radiation Oncology)  Erik Pelletier MD as Consulting Physician (Gastroenterology)  Gage Collier MD as Consulting Physician (Pulmonary Disease)     You have chosen to receive care through a telehealth visit.  Do you consent to use a video/audio connection for your medical care today? Yes        No ref. provider found     Chief Complaint   Patient presents with   • Follow-up     Gastric Fistula         HISTORY OF PRESENT ILLNESS:  Patient presents for follow-up of gastric fistula status post Ovesco clip placement on 2 separate occasions with preceding cautery for gastric fistula closure.  She continues to have initial response with resolution of her drainage and what appears to be closure of fistula only to present subsequently with recurrent drainage from her fistula.  She is calling today to discuss options.    Review of records reveal mild anemia hemoglobin 9.6/hematocrit 32.2 on labs of 321/20.  White count mildly elevated 19.03 and platelet count of 607,000    PAST MEDICAL HISTORY  Past Medical History:   Diagnosis Date   • Breast cancer (CMS/HCC)    • Cancer (CMS/HCC)     lung   • Colon polyp    • COPD (chronic obstructive pulmonary disease) (CMS/HCC)    • Disease of thyroid gland    • Dysrhythmia     tachycardia   • Frequent PVCs     bigeminal pvc   • GERD (gastroesophageal reflux disease)    • History of pneumonia    • Hodgkin's disease (CMS/HCC)    • Hypertension    • Lung cancer (CMS/HCC)    • Ovarian cancer (CMS/HCC)         PAST SURGICAL HISTORY  Past Surgical History:   Procedure  Laterality Date   • BREAST LUMPECTOMY     • CATARACT EXTRACTION     • CATARACT EXTRACTION W/ INTRAOCULAR LENS IMPLANTW/ TRABECULECTOMY     • COLONOSCOPY      limited due to scar tissue more thatn 10 years ago   • ENDOSCOPY     • ENDOSCOPY N/A 3/19/2020    Procedure: ESOPHAGOGASTRODUODENOSCOPY WITH ARGON PLASMA COAGULATOR;  Surgeon: Felice Mcintosh MD;  Location:  [facility];  Service: Gastroenterology;  Laterality: N/A;   • ENDOSCOPY N/A 4/7/2020    Procedure: ESOPHAGOGASTRODUODENOSCOPY;  Surgeon: Felice Mcintosh MD;  Location:  [facility];  Service: Gastroenterology;  Laterality: N/A;  APC was used to remove ovesco clip and another one was placed.    • GASTROSTOMY FEEDING TUBE INSERTION  2018    [facility]   • HIP ARTHROSCOPY Right 2011    total hip replacement    • HYSTERECTOMY     • LUNG SURGERY  2000   • RETINAL DETACHMENT REPAIR  2015   • SPLENECTOMY  1980   • THYROIDECTOMY  2012    partial   • UPPER GASTROINTESTINAL ENDOSCOPY  2018 x 3    esophageal strictures by [facility]        MEDICATIONS:    Current Outpatient Medications:   •  acetaminophen (TYLENOL) 325 MG tablet, Take 2 tablets by mouth Every 6 (Six) Hours As Needed for Mild Pain ., Disp: , Rfl:   •  budesonide (PULMICORT) 0.25 MG/2ML nebulizer solution, Take  by nebulization Daily., Disp: , Rfl:   •  diazePAM (VALIUM) 5 MG tablet, if needed., Disp: , Rfl:   •  fluticasone (FLONASE) 50 MCG/ACT nasal spray, 2 sprays into the nostril(s) as directed by provider 2 (Two) Times a Day., Disp: 1 bottle, Rfl: 11  •  Fluticasone-Umeclidin-Vilant (TRELEGY ELLIPTA IN), Inhale Daily., Disp: , Rfl:   •  levocetirizine (XYZAL) 5 MG tablet, Take 1 tablet by mouth Every Evening., Disp: 30 tablet, Rfl: 11  •  metoprolol tartrate (LOPRESSOR) 50 MG tablet, Take 1 tablet by mouth 2 (Two) Times a Day., Disp: , Rfl:   •  montelukast (SINGULAIR) 10 MG tablet, TAKE ONE TABLET BY MOUTH EVERY NIGHT, Disp: 90 tablet, Rfl: 10  •   omeprazole (priLOSEC) 20 MG capsule, Take 20 mg by mouth Daily., Disp: , Rfl:   •  predniSONE (DELTASONE) 20 MG tablet, Take 2 tablets daily x 2 days followed by 1 tablet daily x 3 days, then 0.5 tablet daily x 4 days., Disp: 9 tablet, Rfl: 0  •  traZODone (DESYREL) 100 MG tablet, Take 100 mg by mouth As Needed., Disp: , Rfl:   •  vitamin C (ASCORBIC ACID) 500 MG tablet, Take 500 mg by mouth 2 (Two) Times a Day., Disp: , Rfl:     ALLERGIES  No Known Allergies    FAMILY HISTORY:  Family History   Problem Relation Age of Onset   • Cancer Mother    • Lung cancer Mother    • Heart disease Father    • Heart disease Brother    • Breast cancer Maternal Grandmother    • Colon polyps Sister    • Colon cancer Neg Hx        SOCIAL HISTORY  Social History     Socioeconomic History   • Marital status:      Spouse name: Not on file   • Number of children: Not on file   • Years of education: Not on file   • Highest education level: Not on file   Tobacco Use   • Smoking status: Former Smoker     Packs/day: 3.00     Years: 40.00     Pack years: 120.00     Types: Cigarettes     Last attempt to quit: 5/10/2010     Years since quittin.9   • Smokeless tobacco: Never Used   Substance and Sexual Activity   • Alcohol use: Yes     Comment: Socially    • Drug use: Yes     Types: Marijuana     Comment: brownie   • Sexual activity: Defer     Socioeconomic History:  Unchanged from prior.  .  She does not have any children.  Non-smoker/nondrinker (drinks alcoholic beverages in social moderation.).  Realtor.       REVIEW OF SYSTEMS  Review of Systems   Constitutional: Positive for activity change, appetite change, diaphoresis, fatigue and unexpected weight loss. Negative for chills, fever and unexpected weight gain.   HENT: Positive for trouble swallowing. Negative for voice change.    Gastrointestinal: Positive for GERD. Negative for abdominal distention, abdominal pain, anal bleeding, blood in stool, constipation, diarrhea,  nausea, rectal pain, vomiting and indigestion.     I reviewed the above-noted review of systems    PHYSICAL EXAM   There were no vitals taken for this visit.  General: Alert and oriented in good spirits  HEENT: Normal speech.  Neck: Normal neck motion  Lungs: Normal respirations no evidence of labored breathing  Neurologic: Normal cognition.  Normal affect         Results Review:  I reviewed the patient's new clinical results.      ASSESSMENT  1.-  Refractory gastric fistula post PEG removal.  I discussed options.  Fibrin glue removal of previous vascular clips and banding are options that she would prefer prior to referral for surgery.  2.-  Gastric fundic polyp  3.-  Anemia.  Check iron studies and recheck CBC  4.-  Thrombocytosis    PLAN  1.-  EGD for Ovesco clip removal and subsequent fibrin glue installation into fistula with banding.  If this fails surgical referral is recommended.      I discussed the patients findings and my recommendations with patient    Felice Mcintosh MD  4/22/2020   15:21    Telemedicine visit of 15-minute duration  This was an audio and video enabled telemedicine encounter.    Much of this note is an electronic transcription of spoken language to printed text. Electronic transcription of spoken language may permit erroneous, nonsensical word phrases to be inadvertently transcribed.  Although I have reviewed the note for these errors, some may still be present.

## 2020-04-22 ENCOUNTER — TELEPHONE (OUTPATIENT)
Dept: GASTROENTEROLOGY | Facility: CLINIC | Age: 70
End: 2020-04-22

## 2020-04-22 PROBLEM — D50.9 IRON DEFICIENCY ANEMIA: Status: ACTIVE | Noted: 2020-04-22

## 2020-04-22 PROBLEM — D75.839 THROMBOCYTOSIS: Status: ACTIVE | Noted: 2020-04-22

## 2020-04-22 PROBLEM — K31.6 GASTROCUTANEOUS FISTULA: Status: ACTIVE | Noted: 2020-04-22

## 2020-04-22 NOTE — TELEPHONE ENCOUNTER
Called patient back . Patient wanted to know when and where she could have her labs drawn . Notified patient that the lab in our building is currently closed but I know the [facility] lab [facility]  is still open and closes at 4:00PM. Patient confirmed she understood and had no additional questions.    Slightly increased DME, worse. Luca Garcia

## 2020-04-22 NOTE — TELEPHONE ENCOUNTER
Spoke with Mrs. Pickett about her scheduled EGD for 4/27/20 @ 8:30am with an arrival time of 7:30am. She confirmed that this time and date works for her. I explained her prep over the phone. She had questions about a B12 injection and some labs so I transferred her over to Dr. Don Lopes MA

## 2020-04-24 ENCOUNTER — LAB (OUTPATIENT)
Dept: LAB | Facility: HOSPITAL | Age: 70
End: 2020-04-24

## 2020-04-24 DIAGNOSIS — D50.9 IRON DEFICIENCY ANEMIA, UNSPECIFIED IRON DEFICIENCY ANEMIA TYPE: ICD-10-CM

## 2020-04-24 LAB
DEPRECATED RDW RBC AUTO: 50.4 FL (ref 37–54)
ERYTHROCYTE [DISTWIDTH] IN BLOOD BY AUTOMATED COUNT: 16.3 % (ref 12.3–15.4)
FERRITIN SERPL-MCNC: 285 NG/ML (ref 13–150)
FOLATE SERPL-MCNC: 9.78 NG/ML (ref 4.78–24.2)
HCT VFR BLD AUTO: 29.7 % (ref 34–46.6)
HGB BLD-MCNC: 9.7 G/DL (ref 12–15.9)
IRON 24H UR-MRATE: 27 MCG/DL (ref 37–145)
IRON SATN MFR SERPL: 11 % (ref 20–50)
MCH RBC QN AUTO: 27.4 PG (ref 26.6–33)
MCHC RBC AUTO-ENTMCNC: 32.7 G/DL (ref 31.5–35.7)
MCV RBC AUTO: 83.9 FL (ref 79–97)
PLATELET # BLD AUTO: 708 10*3/MM3 (ref 140–450)
PMV BLD AUTO: 10.5 FL (ref 6–12)
RBC # BLD AUTO: 3.54 10*6/MM3 (ref 3.77–5.28)
TIBC SERPL-MCNC: 247 MCG/DL (ref 298–536)
TRANSFERRIN SERPL-MCNC: 166 MG/DL (ref 200–360)
VIT B12 BLD-MCNC: 282 PG/ML (ref 211–946)
WBC NRBC COR # BLD: 8.62 10*3/MM3 (ref 3.4–10.8)

## 2020-04-24 PROCEDURE — 84466 ASSAY OF TRANSFERRIN: CPT

## 2020-04-24 PROCEDURE — 83540 ASSAY OF IRON: CPT

## 2020-04-24 PROCEDURE — 82607 VITAMIN B-12: CPT

## 2020-04-24 PROCEDURE — 36415 COLL VENOUS BLD VENIPUNCTURE: CPT

## 2020-04-24 PROCEDURE — 82746 ASSAY OF FOLIC ACID SERUM: CPT

## 2020-04-24 PROCEDURE — 85027 COMPLETE CBC AUTOMATED: CPT

## 2020-04-24 PROCEDURE — 82728 ASSAY OF FERRITIN: CPT

## 2020-04-27 NOTE — PROGRESS NOTES
Folate and B12 levels within normal limits.  Mildly elevated ferritin is not felt to be clinically significant (not indicative of hemochromatosis).  Iron studies are suggestive of anemia of chronic disease with low iron but low transferrin and depressed TIBC..  Hemoglobin 9.7 is stable with a normal MCV of 83.9.  Platelet count elevated 708,000.  This along with elevated ferritin would suggest acute phase reactants

## 2020-05-04 ENCOUNTER — TELEPHONE (OUTPATIENT)
Dept: GASTROENTEROLOGY | Facility: CLINIC | Age: 70
End: 2020-05-04

## 2020-05-04 NOTE — TELEPHONE ENCOUNTER
Called patient and notified her of  response . I will verbally clarify tomorrow with  if the patient is anemic and what he means by monitoring the patient and give her a call back tomorrow. Patient confirmed she understood and had no additional questions at this time.

## 2020-05-04 NOTE — TELEPHONE ENCOUNTER
"----- Message from Felice Mcintosh MD sent at 5/4/2020 12:13 PM EDT -----  Any kind of inflammation cause elevated ferritin.  That is what an acute phase reactant is.  No need for medications for this continue to monitor.  ----- Message -----  From: Marianne Viramontes MA  Sent: 5/4/2020  11:53 AM EDT  To: Felice Mcintosh MD    Patient called with some additional questions about her results letter.     She wants to know what you mean by \" This along with the elevated ferritin would suggest acute phase reactants. \" ?     Is she anemic and if yes should she be taking a medication or what are your recommendations for this?       Thank you,     LAURENT Lopes        "

## 2020-05-05 NOTE — TELEPHONE ENCOUNTER
Called patient back to notify her per  she is a little anemic and from her results it looks like it may be anemia of chronic disease so at this time it is recommended the patient follow up with her primary care physician because  does not believe it is related to her GI tract. Patient confirmed she understood and had no additional questions about her lab results.      Patient wanted to know if we had hear anything about getting her scheduled for the EGD in regards to her Gastrocutaneous Fistula. Notified the patient that the  is still working on getting her scheduled but I will remind the  at this time and even offered to let the patient speak with the . Patient refused at this time because she just spoke with her yesterday . Confirmed I understood and would at least notify the  that the patient called to check in. Patient confirmed she understood and had no additional questions.           Verbally notified Roge Bae of my phone call with the patient and confirmed she is still working on getting the patient scheduled. 05- 3:28PM

## 2020-05-08 ENCOUNTER — OFFICE VISIT (OUTPATIENT)
Dept: PREADMISSION TESTING | Facility: HOSPITAL | Age: 70
End: 2020-05-08

## 2020-05-08 PROCEDURE — U0002 COVID-19 LAB TEST NON-CDC: HCPCS | Performed by: INTERNAL MEDICINE

## 2020-05-08 PROCEDURE — U0004 COV-19 TEST NON-CDC HGH THRU: HCPCS | Performed by: INTERNAL MEDICINE

## 2020-05-09 LAB
REF LAB TEST METHOD: NORMAL
SARS-COV-2 RNA RESP QL NAA+PROBE: NOT DETECTED

## 2020-05-10 ENCOUNTER — ANESTHESIA EVENT (OUTPATIENT)
Dept: GASTROENTEROLOGY | Facility: HOSPITAL | Age: 70
End: 2020-05-10

## 2020-05-11 ENCOUNTER — ANESTHESIA (OUTPATIENT)
Dept: GASTROENTEROLOGY | Facility: HOSPITAL | Age: 70
End: 2020-05-11

## 2020-05-11 ENCOUNTER — HOSPITAL ENCOUNTER (OUTPATIENT)
Facility: HOSPITAL | Age: 70
Setting detail: HOSPITAL OUTPATIENT SURGERY
Discharge: HOME | End: 2020-05-11
Attending: INTERNAL MEDICINE | Admitting: INTERNAL MEDICINE

## 2020-05-11 VITALS
SYSTOLIC BLOOD PRESSURE: 150 MMHG | DIASTOLIC BLOOD PRESSURE: 73 MMHG | HEART RATE: 86 BPM | BODY MASS INDEX: 19.63 KG/M2 | TEMPERATURE: 99.7 F | WEIGHT: 115 LBS | RESPIRATION RATE: 18 BRPM | HEIGHT: 64 IN | OXYGEN SATURATION: 98 %

## 2020-05-11 LAB
HCT VFR BLD AUTO: 36.6 % (ref 34–46.6)
HGB BLD-MCNC: 11 G/DL (ref 12–15.9)

## 2020-05-11 PROCEDURE — C1725 CATH, TRANSLUMIN NON-LASER: HCPCS | Performed by: INTERNAL MEDICINE

## 2020-05-11 PROCEDURE — 25010000003 LIDOCAINE 1 % SOLUTION: Performed by: NURSE ANESTHETIST, CERTIFIED REGISTERED

## 2020-05-11 PROCEDURE — C1769 GUIDE WIRE: HCPCS | Performed by: INTERNAL MEDICINE

## 2020-05-11 PROCEDURE — 85014 HEMATOCRIT: CPT | Performed by: ANESTHESIOLOGY

## 2020-05-11 PROCEDURE — 43249 ESOPH EGD DILATION <30 MM: CPT | Performed by: INTERNAL MEDICINE

## 2020-05-11 PROCEDURE — 43255 EGD CONTROL BLEEDING ANY: CPT | Performed by: INTERNAL MEDICINE

## 2020-05-11 PROCEDURE — 25010000002 PROPOFOL 10 MG/ML EMULSION: Performed by: NURSE ANESTHETIST, CERTIFIED REGISTERED

## 2020-05-11 PROCEDURE — 85018 HEMOGLOBIN: CPT | Performed by: ANESTHESIOLOGY

## 2020-05-11 DEVICE — SYS CLIP GI OTSC NITNL 12/6T 220CM: Type: IMPLANTABLE DEVICE | Site: STOMACH | Status: FUNCTIONAL

## 2020-05-11 RX ORDER — LIDOCAINE HYDROCHLORIDE 10 MG/ML
INJECTION, SOLUTION INFILTRATION; PERINEURAL AS NEEDED
Status: DISCONTINUED | OUTPATIENT
Start: 2020-05-11 | End: 2020-05-11 | Stop reason: SURG

## 2020-05-11 RX ORDER — SODIUM CHLORIDE 0.9 % (FLUSH) 0.9 %
10 SYRINGE (ML) INJECTION EVERY 12 HOURS SCHEDULED
Status: CANCELLED | OUTPATIENT
Start: 2020-05-11

## 2020-05-11 RX ORDER — FENTANYL CITRATE 50 UG/ML
50 INJECTION, SOLUTION INTRAMUSCULAR; INTRAVENOUS
Status: DISCONTINUED | OUTPATIENT
Start: 2020-05-11 | End: 2020-05-11 | Stop reason: HOSPADM

## 2020-05-11 RX ORDER — FIBRINOGEN HUMAN AND THROMBIN HUMAN 90; 500 [IU]/ML; [IU]/ML
SOLUTION TOPICAL AS NEEDED
Status: DISCONTINUED | OUTPATIENT
Start: 2020-05-11 | End: 2020-05-11 | Stop reason: HOSPADM

## 2020-05-11 RX ORDER — SODIUM CHLORIDE 0.9 % (FLUSH) 0.9 %
10 SYRINGE (ML) INJECTION AS NEEDED
Status: DISCONTINUED | OUTPATIENT
Start: 2020-05-11 | End: 2020-05-11 | Stop reason: HOSPADM

## 2020-05-11 RX ORDER — SODIUM CHLORIDE 0.9 % (FLUSH) 0.9 %
10 SYRINGE (ML) INJECTION AS NEEDED
Status: CANCELLED | OUTPATIENT
Start: 2020-05-11

## 2020-05-11 RX ORDER — PROPOFOL 10 MG/ML
VIAL (ML) INTRAVENOUS AS NEEDED
Status: DISCONTINUED | OUTPATIENT
Start: 2020-05-11 | End: 2020-05-11 | Stop reason: SURG

## 2020-05-11 RX ORDER — PROPOFOL 10 MG/ML
VIAL (ML) INTRAVENOUS CONTINUOUS PRN
Status: DISCONTINUED | OUTPATIENT
Start: 2020-05-11 | End: 2020-05-11 | Stop reason: SURG

## 2020-05-11 RX ORDER — SODIUM CHLORIDE, SODIUM LACTATE, POTASSIUM CHLORIDE, CALCIUM CHLORIDE 600; 310; 30; 20 MG/100ML; MG/100ML; MG/100ML; MG/100ML
9 INJECTION, SOLUTION INTRAVENOUS CONTINUOUS
Status: CANCELLED | OUTPATIENT
Start: 2020-05-11

## 2020-05-11 RX ORDER — FAMOTIDINE 10 MG/ML
20 INJECTION, SOLUTION INTRAVENOUS ONCE
Status: COMPLETED | OUTPATIENT
Start: 2020-05-11 | End: 2020-05-11

## 2020-05-11 RX ORDER — LIDOCAINE HYDROCHLORIDE 10 MG/ML
0.5 INJECTION, SOLUTION EPIDURAL; INFILTRATION; INTRACAUDAL; PERINEURAL ONCE AS NEEDED
Status: CANCELLED | OUTPATIENT
Start: 2020-05-11

## 2020-05-11 RX ORDER — SODIUM CHLORIDE, SODIUM LACTATE, POTASSIUM CHLORIDE, CALCIUM CHLORIDE 600; 310; 30; 20 MG/100ML; MG/100ML; MG/100ML; MG/100ML
9 INJECTION, SOLUTION INTRAVENOUS CONTINUOUS
Status: DISCONTINUED | OUTPATIENT
Start: 2020-05-11 | End: 2020-05-11 | Stop reason: HOSPADM

## 2020-05-11 RX ORDER — SODIUM CHLORIDE 0.9 % (FLUSH) 0.9 %
10 SYRINGE (ML) INJECTION EVERY 12 HOURS SCHEDULED
Status: DISCONTINUED | OUTPATIENT
Start: 2020-05-11 | End: 2020-05-11 | Stop reason: HOSPADM

## 2020-05-11 RX ORDER — FAMOTIDINE 20 MG/1
20 TABLET, FILM COATED ORAL ONCE
Status: DISCONTINUED | OUTPATIENT
Start: 2020-05-11 | End: 2020-05-11 | Stop reason: HOSPADM

## 2020-05-11 RX ORDER — LIDOCAINE HYDROCHLORIDE 10 MG/ML
0.5 INJECTION, SOLUTION EPIDURAL; INFILTRATION; INTRACAUDAL; PERINEURAL ONCE AS NEEDED
Status: DISCONTINUED | OUTPATIENT
Start: 2020-05-11 | End: 2020-05-11 | Stop reason: HOSPADM

## 2020-05-11 RX ADMIN — PROPOFOL 100 MCG/KG/MIN: 10 INJECTION, EMULSION INTRAVENOUS at 12:13

## 2020-05-11 RX ADMIN — SODIUM CHLORIDE, POTASSIUM CHLORIDE, SODIUM LACTATE AND CALCIUM CHLORIDE 9 ML/HR: 600; 310; 30; 20 INJECTION, SOLUTION INTRAVENOUS at 11:36

## 2020-05-11 RX ADMIN — LIDOCAINE HYDROCHLORIDE 100 MG: 10 INJECTION, SOLUTION INFILTRATION; PERINEURAL at 12:13

## 2020-05-11 RX ADMIN — SODIUM CHLORIDE, POTASSIUM CHLORIDE, SODIUM LACTATE AND CALCIUM CHLORIDE: 600; 310; 30; 20 INJECTION, SOLUTION INTRAVENOUS at 12:37

## 2020-05-11 RX ADMIN — FAMOTIDINE 20 MG: 10 INJECTION INTRAVENOUS at 11:41

## 2020-05-11 RX ADMIN — PROPOFOL 100 MG: 10 INJECTION, EMULSION INTRAVENOUS at 12:13

## 2020-05-11 NOTE — ANESTHESIA PREPROCEDURE EVALUATION
Anesthesia Evaluation     Patient summary reviewed and Nursing notes reviewed   NPO Solid Status: > 8 hours  NPO Liquid Status: > 8 hours           Airway   TM distance: >3 FB  Neck ROM: full  No difficulty expected  Dental      Pulmonary    (+) lung cancer, COPD, shortness of breath, decreased breath sounds,   Cardiovascular     Rhythm: regular  Rate: normal    (+) hypertension, dysrhythmias,       Neuro/Psych  GI/Hepatic/Renal/Endo    (+)  GERD,      Musculoskeletal     Abdominal    Substance History      OB/GYN          Other        ROS/Med Hx Other: Mod AI, severe TR, pulm HTN                  Anesthesia Plan    ASA 4     general     intravenous induction     Anesthetic plan, all risks, benefits, and alternatives have been provided, discussed and informed consent has been obtained with: patient.    Plan discussed with CRNA.

## 2020-05-11 NOTE — ANESTHESIA POSTPROCEDURE EVALUATION
Patient: Ita Pickett    Procedure Summary     Date:  05/11/20 Room / Location:  [facility] 1 /  [facility]    Anesthesia Start:  1207 Anesthesia Stop:  1251    Procedure:  ESOPHAGOGASTRODUODENOSCOPY / GASTROCUTANEOUS FISTULA (N/A ) Diagnosis:       Gastrocutaneous fistula      (Gastrocutaneous fistula [K31.6])    Surgeon:  Felice Mcintosh MD Provider:  Nam Zaidi MD    Anesthesia Type:  general ASA Status:  4          Anesthesia Type: general    Vitals  No vitals data found for the desired time range.          Post Anesthesia Care and Evaluation    Patient location during evaluation: PACU  Patient participation: complete - patient participated  Level of consciousness: awake and alert  Pain score: 0  Pain management: adequate  Airway patency: patent  Anesthetic complications: No anesthetic complications  PONV Status: none  Cardiovascular status: hemodynamically stable and acceptable  Respiratory status: nonlabored ventilation, acceptable and nasal cannula  Hydration status: acceptable

## 2020-05-11 NOTE — OP NOTE
EGD summary  See EGD report for details    Previous fistula stent had passed spontaneously.  Esophageal dilation was performed to 20 mm and the fistula site was again closed with an  Ovesco clip.  This was done after fistulous tract had been brushed with a cytology brush both from the cutaneous and gastric sides.Tisseel was injected into the fistulous tract also.    Final impression  Esophageal stricture dilated to 20 mm  Fistulous tract again closed and sealed.    Plan  Surgical referral for persistent/refractory fistula

## 2020-05-14 NOTE — H&P
GASTROENTEROLOGY NOTE  Ita Pickett  [id number]  1950    CARE TEAM  Patient Care Team:  Lucho Peters MD as PCP - General (Family Medicine)  Anatoly Jay MD as PCP - Claims Attributed  Lucho Peters MD as Consulting Physician (Family Medicine)  Anatoly Jay MD as Referring Physician (General Surgery)  Nacho Hanna MD as Consulting Physician (Radiation Oncology)  Erik Pelletier MD as Consulting Physician (Gastroenterology)  Gage Collier MD as Consulting Physician (Pulmonary Disease)    Felice Mcintosh MD    Chief complaint  Refractory gastrocutaneous fistula following PEG removal  Dysphagia to solids    HISTORY OF PRESENT ILLNESS:  Patient presents for evaluation of the above-noted    She has failed to close her gastrocutaneous fistula after 2 attempts with Ovesco clips.  She presents today to reattempt closure of the fistula endoscopically with an eye towards surgical referral if we fail to resolve her issues.    She is also requesting esophageal dilation for dysphagia to solids.  She denies odynophagia, early satiety or unexplained weight loss.    PAST MEDICAL HISTORY  Past Medical History:   Diagnosis Date   • Breast cancer (CMS/HCC)    • Cancer (CMS/HCC)     lung   • Colon polyp    • COPD (chronic obstructive pulmonary disease) (CMS/HCC)    • Disease of thyroid gland    • Dysrhythmia     tachycardia   • Frequent PVCs     bigeminal pvc   • GERD (gastroesophageal reflux disease)    • History of pneumonia    • Hodgkin's disease (CMS/HCC)    • Hypertension    • Lung cancer (CMS/HCC)    • Ovarian cancer (CMS/HCC)         PAST SURGICAL HISTORY  Past Surgical History:   Procedure Laterality Date   • BREAST LUMPECTOMY     • CATARACT EXTRACTION     • CATARACT EXTRACTION W/ INTRAOCULAR LENS IMPLANTW/ TRABECULECTOMY     • COLONOSCOPY      limited due to scar tissue more thatn 10 years ago   • ENDOSCOPY     • ENDOSCOPY N/A 3/19/2020    Procedure:  ESOPHAGOGASTRODUODENOSCOPY WITH ARGON PLASMA COAGULATOR;  Surgeon: Felice Mcintosh MD;  Location:  [facility];  Service: Gastroenterology;  Laterality: N/A;   • ENDOSCOPY N/A 4/7/2020    Procedure: ESOPHAGOGASTRODUODENOSCOPY;  Surgeon: Felice Mcintosh MD;  Location:  [facility];  Service: Gastroenterology;  Laterality: N/A;  APC was used to remove ovesco clip and another one was placed.    • ENDOSCOPY N/A 5/11/2020    Procedure: ESOPHAGOGASTRODUODENOSCOPY;  Surgeon: Felice Mcintosh MD;  Location:  [facility];  Service: Gastroenterology;  Laterality: N/A;  balloon dilation done using 18-20 balloon   • GASTROSTOMY FEEDING TUBE INSERTION  2018    [facility]   • HIP ARTHROSCOPY Right 2011    total hip replacement    • HYSTERECTOMY     • LUNG SURGERY  2000   • RETINAL DETACHMENT REPAIR  2015   • SPLENECTOMY  1980   • THYROIDECTOMY  2012    partial   • UPPER GASTROINTESTINAL ENDOSCOPY  2018 x 3    esophageal strictures by [facility]        MEDICATIONS:  No current facility-administered medications for this encounter.     Current Outpatient Medications:   •  budesonide (PULMICORT) 0.25 MG/2ML nebulizer solution, Take  by nebulization Daily., Disp: , Rfl:   •  fluticasone (FLONASE) 50 MCG/ACT nasal spray, 2 sprays into the nostril(s) as directed by provider 2 (Two) Times a Day., Disp: 1 bottle, Rfl: 11  •  levocetirizine (XYZAL) 5 MG tablet, Take 1 tablet by mouth Every Evening., Disp: 30 tablet, Rfl: 11  •  metoprolol tartrate (LOPRESSOR) 50 MG tablet, Take 1 tablet by mouth 2 (Two) Times a Day., Disp: , Rfl:   •  omeprazole (priLOSEC) 20 MG capsule, Take 20 mg by mouth Daily., Disp: , Rfl:   •  acetaminophen (TYLENOL) 325 MG tablet, Take 2 tablets by mouth Every 6 (Six) Hours As Needed for Mild Pain ., Disp: , Rfl:   •  diazePAM (VALIUM) 5 MG tablet, if needed., Disp: , Rfl:   •  Fluticasone-Umeclidin-Vilant (TRELEGY ELLIPTA IN), Inhale Daily., Disp: ,  Rfl:   •  montelukast (SINGULAIR) 10 MG tablet, TAKE ONE TABLET BY MOUTH EVERY NIGHT, Disp: 90 tablet, Rfl: 10  •  predniSONE (DELTASONE) 20 MG tablet, Take 2 tablets daily x 2 days followed by 1 tablet daily x 3 days, then 0.5 tablet daily x 4 days., Disp: 9 tablet, Rfl: 0  •  traZODone (DESYREL) 100 MG tablet, Take 100 mg by mouth As Needed., Disp: , Rfl:   •  vitamin C (ASCORBIC ACID) 500 MG tablet, Take 500 mg by mouth 2 (Two) Times a Day., Disp: , Rfl:     ALLERGIES  No Known Allergies    FAMILY HISTORY:  Family History   Problem Relation Age of Onset   • Cancer Mother    • Lung cancer Mother    • Heart disease Father    • Heart disease Brother    • Breast cancer Maternal Grandmother    • Colon polyps Sister    • Colon cancer Neg Hx        SOCIAL HISTORY  Social History     Socioeconomic History   • Marital status:      Spouse name: Not on file   • Number of children: Not on file   • Years of education: Not on file   • Highest education level: Not on file   Tobacco Use   • Smoking status: Former Smoker     Packs/day: 3.00     Years: 40.00     Pack years: 120.00     Types: Cigarettes     Last attempt to quit: 5/10/2010     Years since quitting: 10.0   • Smokeless tobacco: Never Used   Substance and Sexual Activity   • Alcohol use: Yes     Comment: Socially    • Drug use: Yes     Types: Marijuana     Comment: brownie   • Sexual activity: Defer     Socioeconomic History:  .  She does not have children.  Former smoker.  Uses marijuana (pancho) from time to time.  Drinks alcoholic beverages in social moderation       REVIEW OF SYSTEMS  Review of Systems  Grossly unremarkable and unchanged from prior    PHYSICAL EXAM   General: Alert and oriented x 3. In no apparent or acute distress.  and No stigmata of chronic liver disease  HEENT: Anicteric slcera. Normal oropharynx  Neck: Supple. Without lymphadenopathy  Lungs: Clear to ausculation. Without rales, robchi and wheezing  Abdomen:  Soft,non-distended  without palpable masses or hepatosplenomeagaly, areas of rebound tenderness or guarding. PEG tube site with surrounding erythema noted  Extremeties: without clubbing, cyanosis or edema  Neurologic:  Alert and oriented x 3 without focal motor or sensory deficits      Results for orders placed or performed during the hospital encounter of 05/11/20   Hemoglobin & Hematocrit, Blood   Result Value Ref Range    Hemoglobin 11.0 (L) 12.0 - 15.9 g/dL    Hematocrit 36.6 34.0 - 46.6 %        Results Review:  I reviewed the patient's new clinical results.      ASSESSMENT  1.-  Gastrocutaneous fistula.  Plan is to remove the previous clip use fibrin glue and the tract and then closed perhaps with a variceal .  2.-  Dysphasia.  Esophageal dilation recommended    PLAN  EGD for fistula closure and dilation      I discussed the patients findings and my recommendations with patient    Felice Mo Mcintosh MD  5/14/2020   12:03    Addendum  Previously placed clip had fallen off spontaneously and did not need to be removed.  The site was brushed with a cytology brush the tract was injected with fibrin glue and a neurovascular clip was placed with the defect centered at the center of the clip.  Will observe and refer to surgery if fistula fails to close.  Esophagus was dilated to 20 mm      Much of this note is an electronic transcription of spoken language to printed text. Electronic transcription of spoken language may permit erroneous, nonsensical word phrases to be inadvertently transcribed.  Although I have reviewed the note for these errors, some may still be present.

## 2020-05-22 ENCOUNTER — OFFICE VISIT (OUTPATIENT)
Dept: CARDIOLOGY | Facility: CLINIC | Age: 70
End: 2020-05-22

## 2020-05-22 ENCOUNTER — OFFICE VISIT (OUTPATIENT)
Dept: GASTROENTEROLOGY | Facility: CLINIC | Age: 70
End: 2020-05-22

## 2020-05-22 VITALS
HEART RATE: 103 BPM | SYSTOLIC BLOOD PRESSURE: 102 MMHG | OXYGEN SATURATION: 97 % | DIASTOLIC BLOOD PRESSURE: 60 MMHG | BODY MASS INDEX: 19.81 KG/M2 | WEIGHT: 116 LBS | HEIGHT: 64 IN

## 2020-05-22 VITALS
SYSTOLIC BLOOD PRESSURE: 136 MMHG | HEART RATE: 100 BPM | BODY MASS INDEX: 19.98 KG/M2 | TEMPERATURE: 98.4 F | WEIGHT: 116.4 LBS | DIASTOLIC BLOOD PRESSURE: 78 MMHG

## 2020-05-22 DIAGNOSIS — R13.19 ESOPHAGEAL DYSPHAGIA: ICD-10-CM

## 2020-05-22 DIAGNOSIS — I48.0 PAROXYSMAL ATRIAL FIBRILLATION (HCC): Primary | ICD-10-CM

## 2020-05-22 DIAGNOSIS — T17.908D ASPIRATION INTO AIRWAY, SUBSEQUENT ENCOUNTER: Primary | ICD-10-CM

## 2020-05-22 DIAGNOSIS — Z87.19 HISTORY OF ESOPHAGEAL STRICTURE: ICD-10-CM

## 2020-05-22 DIAGNOSIS — K31.6 ACQUIRED GASTRIC FISTULA: ICD-10-CM

## 2020-05-22 DIAGNOSIS — I27.20 PULMONARY HYPERTENSION (HCC): ICD-10-CM

## 2020-05-22 DIAGNOSIS — I31.39 PERICARDIAL EFFUSION: ICD-10-CM

## 2020-05-22 PROBLEM — T17.908A ASPIRATION INTO AIRWAY: Status: ACTIVE | Noted: 2020-05-22

## 2020-05-22 PROCEDURE — 99214 OFFICE O/P EST MOD 30 MIN: CPT | Performed by: INTERNAL MEDICINE

## 2020-05-22 PROCEDURE — 99213 OFFICE O/P EST LOW 20 MIN: CPT | Performed by: INTERNAL MEDICINE

## 2020-05-22 NOTE — PROGRESS NOTES
GASTROENTEROLOGY OFFICE NOTE  Ita Pickett  [id number]  1950    CARE TEAM  Patient Care Team:  Lucho Peters MD as PCP - General (Family Medicine)  Anatoly Jay MD as PCP - Claims Attributed  Lucho Peters MD as Consulting Physician (Family Medicine)  Anatoly Jay MD as Referring Physician (General Surgery)  Nacho Hanna MD as Consulting Physician (Radiation Oncology)  Erik Pelletier MD as Consulting Physician (Gastroenterology)  Gage Collier MD as Consulting Physician (Pulmonary Disease)    Lucho Peters MD     Chief Complaint   Patient presents with   • Follow-up     Post EGD         HISTORY OF PRESENT ILLNESS:  Patient presents for follow-up after recent EGD to close her gastrocutaneous fistula which failed to close after PEG tube removal.  2 tabs remain with thermal therapy and clip placement and on this last occasion 11 days ago we did mechanical abrasion with a cytology brush of the fistulous tract and closed it with a new oVESCO clip.  She states that she is doing well without any leakage.    She still has some residual dysphagia to solids and, it is noted that she was dilated to 20 mm at the time of her recent upper endoscopy.  She notices about every 3 days and does have to chew carefully and eat slowly to avoid the sensation of food getting stuck in her esophagus but again it is improved following esophageal dilation although incompletely resolved.    She also is complaining of constipation with infrequent bowel movements.  She denies odynophagia early satiety unexplained weight loss melena or bright red blood per rectum.    PAST MEDICAL HISTORY  Past Medical History:   Diagnosis Date   • Breast cancer (CMS/HCC)    • Cancer (CMS/HCC)     lung   • Colon polyp    • COPD (chronic obstructive pulmonary disease) (CMS/HCC)    • Disease of thyroid gland    • Dysrhythmia     tachycardia   • Frequent PVCs     bigeminal pvc   • GERD (gastroesophageal  reflux disease)    • History of pneumonia    • Hodgkin's disease (CMS/HCC)    • Hypertension    • Lung cancer (CMS/HCC)    • Ovarian cancer (CMS/HCC)         PAST SURGICAL HISTORY  Past Surgical History:   Procedure Laterality Date   • BREAST LUMPECTOMY     • CATARACT EXTRACTION     • CATARACT EXTRACTION W/ INTRAOCULAR LENS IMPLANTW/ TRABECULECTOMY     • COLONOSCOPY      limited due to scar tissue more thatn 10 years ago   • ENDOSCOPY     • ENDOSCOPY N/A 3/19/2020    Procedure: ESOPHAGOGASTRODUODENOSCOPY WITH ARGON PLASMA COAGULATOR;  Surgeon: Felice Mcintosh MD;  Location:  [facility];  Service: Gastroenterology;  Laterality: N/A;   • ENDOSCOPY N/A 4/7/2020    Procedure: ESOPHAGOGASTRODUODENOSCOPY;  Surgeon: Felice Mcintosh MD;  Location:  [facility];  Service: Gastroenterology;  Laterality: N/A;  APC was used to remove ovesco clip and another one was placed.    • ENDOSCOPY N/A 5/11/2020    Procedure: ESOPHAGOGASTRODUODENOSCOPY;  Surgeon: Felice Mcintosh MD;  Location:  [facility];  Service: Gastroenterology;  Laterality: N/A;  balloon dilation done using 18-20 balloon   • GASTROSTOMY FEEDING TUBE INSERTION  2018    [facility]   • HIP ARTHROSCOPY Right 2011    total hip replacement    • HYSTERECTOMY     • LUNG SURGERY  2000   • RETINAL DETACHMENT REPAIR  2015   • SPLENECTOMY  1980   • THYROIDECTOMY  2012    partial   • UPPER GASTROINTESTINAL ENDOSCOPY  2018 x 3    esophageal strictures by [facility]        MEDICATIONS:    Current Outpatient Medications:   •  budesonide (PULMICORT) 0.25 MG/2ML nebulizer solution, Take  by nebulization Daily., Disp: , Rfl:   •  diazePAM (VALIUM) 5 MG tablet, if needed., Disp: , Rfl:   •  fluticasone (FLONASE) 50 MCG/ACT nasal spray, 2 sprays into the nostril(s) as directed by provider 2 (Two) Times a Day., Disp: 1 bottle, Rfl: 11  •  levocetirizine (XYZAL) 5 MG tablet, Take 1 tablet by mouth Every Evening., Disp: 30  tablet, Rfl: 11  •  metoprolol tartrate (LOPRESSOR) 50 MG tablet, Take 1 tablet by mouth 2 (Two) Times a Day., Disp: , Rfl:   •  montelukast (SINGULAIR) 10 MG tablet, TAKE ONE TABLET BY MOUTH EVERY NIGHT, Disp: 90 tablet, Rfl: 10  •  omeprazole (priLOSEC) 20 MG capsule, Take 20 mg by mouth Daily., Disp: , Rfl:   •  traZODone (DESYREL) 100 MG tablet, Take 100 mg by mouth As Needed., Disp: , Rfl:   •  vitamin C (ASCORBIC ACID) 500 MG tablet, Take 500 mg by mouth 2 (Two) Times a Day., Disp: , Rfl:     ALLERGIES  No Known Allergies    FAMILY HISTORY:  Family History   Problem Relation Age of Onset   • Cancer Mother    • Lung cancer Mother    • Heart disease Father    • Heart disease Brother    • Breast cancer Maternal Grandmother    • Colon polyps Sister    • Colon cancer Neg Hx        SOCIAL HISTORY  Social History     Socioeconomic History   • Marital status:      Spouse name: Not on file   • Number of children: Not on file   • Years of education: Not on file   • Highest education level: Not on file   Tobacco Use   • Smoking status: Former Smoker     Packs/day: 3.00     Years: 40.00     Pack years: 120.00     Types: Cigarettes     Last attempt to quit: 5/10/2010     Years since quitting: 10.0   • Smokeless tobacco: Never Used   Substance and Sexual Activity   • Alcohol use: Yes     Comment: Socially    • Drug use: Yes     Types: Marijuana     Comment: brownie   • Sexual activity: Defer     Socioeconomic History:  .  No children.  Non-smoker.  Rarely drinks alcoholic beverages.  Realtor.  Works part-time.       REVIEW OF SYSTEMS  Review of Systems   Constitutional: Positive for activity change, fatigue and unexpected weight loss. Negative for appetite change, chills, diaphoresis, fever and unexpected weight gain.   HENT: Positive for trouble swallowing. Negative for voice change.    Gastrointestinal: Positive for constipation and GERD. Negative for abdominal distention, abdominal pain, anal bleeding, blood  in stool, diarrhea, nausea, rectal pain, vomiting and indigestion.     I reviewed the above-noted review of systems    PHYSICAL EXAM   There were no vitals taken for this visit.  General: Alert and oriented x 3. In no apparent or acute distress.  and No stigmata of chronic liver disease  HEENT: Anicteric slcera. Normal oropharynx  Neck: Supple. Without lymphadenopathy  CV: Regular rate and rhythm, S1, S2  Lungs: Clear to ausculation. Without rales, rhonchi and wheezing  Abdomen:  Soft,non-distended without palpable masses or hepatosplenomeagaly, areas of rebound tenderness or guarding. PEG tube site appears well-healed without any evidence of drainage.  Extremeties: without clubbing, cyanosis or edema  Neurologic:  Alert and oriented x 3 without focal motor or sensory deficits  Rectal exam: deferred     Results for orders placed or performed during the hospital encounter of 05/11/20   Hemoglobin & Hematocrit, Blood   Result Value Ref Range    Hemoglobin 11.0 (L) 12.0 - 15.9 g/dL    Hematocrit 36.6 34.0 - 46.6 %        Results Review:  I reviewed the patient's new clinical results.      ASSESSMENT  1.-  Residual dysphagia to solids.  We talked about dilating her with a sAVARY dilator instead of a balloon dilator.  On occasions despite the similar diameters of 20 mm, the physics are different and sometimes some individuals will improve with 1 when the other does not work.  Order to hold off on that just yet and reassess in another month  2.-  Gastrocutaneous fistula.  This last attempt seems to have worked well continue to observe and make sure that this resolution is durable.  3.-  GERD.  Continue omeprazole  4.-  Chronic idiopathic constipation.  Daily MiraLAX is recommended  5.-  Thin liquid aspiration.  Reassess.  Patient started having to thicken her liquids and would like to have this reassessed      PLAN  1.-  Continue omeprazole  2.-  Recheck video swallow to make sure that aspiration to thin liquids  previously noted has resolved  3.-  Consider repeat EGD with savory dilation  4.-   Follow-up appointment in 1 month      I discussed the patient's findings and my recommendations with patient    Felice Mcintosh MD  5/22/2020   12:53    Much of this note is an electronic transcription of spoken language to printed text. Electronic transcription of spoken language may permit erroneous, nonsensical word phrases to be inadvertently transcribed.  Although I have reviewed the note for these errors, some may still be present.

## 2020-05-22 NOTE — PROGRESS NOTES
[facility]  Consultation H&P  Ita Pickett  1950  [address]  [place] [address]     VISIT DATE:  05/22/20    PCP: Lucho Peters MD  [address] [place] [address]    IDENTIFICATION: A 69 y.o. female     PROBLEM LIST:  1.  Paroxysmal Arrhythmias (SVT, AFib)              A.  Presents to [facility] in [place], occasional PVC.  SVT noted.  Converted to AFiv with IV Diltiazem.              B.  Echo 3/18/2020 Dr. Martinez:  EF 65%, mod AR/mild MR/ Mod-sev TR with RVSP 45-55mmHg.  Moderate circumferential pericardial effusion with a maximal diameter of 2.3 cm that is predominantly located adjacent to the right ventricle. There was no evidence of cardiac   tamponade. The right ventricle did not demonstrate diastolic collapse.               C.  CHADS2 Vasc = 3 (HTN, Age, Female).  2.  Dyspnea  3.  COPD (chronic obstructive pulmonary disease) (CMS/[facility])              A.  Former Smoking Tobacco abuse, quit 2010  4.  Essential Hypertension  5.  H/O Squamous Cell lung cancer              A.  S/P RLL resection, 2000  6.  H/O Breast cancer (CMS/HCC), s/p lumpectomy  7.  H/O Hodgkin lymphoma (CMS/HCC), 1980              A.  Chest RT              B.  S/P spenectomy  8.  H/O Ovarian Cancer, 1975              A.  S/P PINKY               B.  S/P RT   9.  Esophageal Stricture              A.  Followed previously [facility]              B.  Type II Achalasia              C.  Last esophageal dilation, 3/11/2020              D.  Previous PEG s/p removal  10.  Sepsis, unspecified organism (CMS/HCC)  11.  H/O Partial thyroidectomy        CC:  Chief Complaint   Patient presents with   • supraventriuclar tachycardia       Allergies  No Known Allergies    Current Medications    Current Outpatient Medications:   •  budesonide (PULMICORT) 0.25 MG/2ML nebulizer solution, Take  by nebulization Daily., Disp: , Rfl:   •  diazePAM (VALIUM) 5 MG tablet, if needed., Disp: , Rfl:   •   fluticasone (FLONASE) 50 MCG/ACT nasal spray, 2 sprays into the nostril(s) as directed by provider 2 (Two) Times a Day., Disp: 1 bottle, Rfl: 11  •  levocetirizine (XYZAL) 5 MG tablet, Take 1 tablet by mouth Every Evening., Disp: 30 tablet, Rfl: 11  •  metoprolol tartrate (LOPRESSOR) 50 MG tablet, Take 1 tablet by mouth 2 (Two) Times a Day., Disp: , Rfl:   •  montelukast (SINGULAIR) 10 MG tablet, TAKE ONE TABLET BY MOUTH EVERY NIGHT, Disp: 90 tablet, Rfl: 10  •  omeprazole (priLOSEC) 20 MG capsule, Take 20 mg by mouth Daily., Disp: , Rfl:   •  traZODone (DESYREL) 100 MG tablet, Take 100 mg by mouth As Needed., Disp: , Rfl:   •  vitamin C (ASCORBIC ACID) 500 MG tablet, Take 500 mg by mouth 2 (Two) Times a Day., Disp: , Rfl:      History of Present Illness   HPI  Ita Pickett is a 69 y.o. year old female with the above mentioned PMH who presents for hospital follow-up.  She continues struggle with work of breathing and baseline shortness of breath.    Pt denies any chest pain,  orthopnea, PND, palpitations, lower extremity edema, or claudication. Pt denies history of CHF, DVT, PE, MI, CVA, TIA, or rheumatic fever.       ROS  ROS    SOCIAL HX  Social History     Socioeconomic History   • Marital status:      Spouse name: Not on file   • Number of children: Not on file   • Years of education: Not on file   • Highest education level: Not on file   Tobacco Use   • Smoking status: Former Smoker     Packs/day: 3.00     Years: 40.00     Pack years: 120.00     Types: Cigarettes     Last attempt to quit: 5/10/2010     Years since quitting: 10.0   • Smokeless tobacco: Never Used   Substance and Sexual Activity   • Alcohol use: Yes     Comment: Socially    • Drug use: Yes     Types: Marijuana     Comment: brownie   • Sexual activity: Defer       FAMILY HX  Family History   Problem Relation Age of Onset   • Cancer Mother    • Lung cancer Mother    • Heart disease Father    • Heart disease Brother    • Breast cancer  "Maternal Grandmother    • Colon polyps Sister    • Colon cancer Neg Hx        Vitals:    05/22/20 1130   BP: 102/60   BP Location: Left arm   Patient Position: Sitting   Pulse: 103   SpO2: 97%   Weight: 52.6 kg (116 lb)   Height: 162.6 cm (64\")       PHYSICAL EXAMINATION:  Physical Exam   Constitutional: She appears well-developed and well-nourished.   Frail scoliotic   Neck: Normal range of motion. Neck supple. No hepatojugular reflux and no JVD present. Carotid bruit is not present. No tracheal deviation present. No thyromegaly present.   Cardiovascular: Normal rate, regular rhythm, S1 normal, S2 normal, intact distal pulses and normal pulses. PMI is not displaced. Exam reveals no gallop, no distant heart sounds, no friction rub, no midsystolic click and no opening snap.   No murmur heard.  Pulses:       Radial pulses are 2+ on the right side, and 2+ on the left side.        Dorsalis pedis pulses are 2+ on the right side, and 2+ on the left side.        Posterior tibial pulses are 2+ on the right side, and 2+ on the left side.   Pulmonary/Chest: Effort normal and breath sounds normal. She has no wheezes. She has no rales.   Diminished breath sounds with prolonged expiratory phase   Abdominal: Soft. Bowel sounds are normal. She exhibits no mass. There is no tenderness. There is no guarding.       Diagnostic Data:  Procedures  No results found for: CHLPL, TRIG, HDL, LDLDIRECT  Lab Results   Component Value Date    GLUCOSE 120 (H) 03/21/2020    BUN 24 (H) 03/21/2020    CREATININE 0.65 03/21/2020     03/21/2020    K 4.6 03/21/2020     03/21/2020    CO2 28.0 03/21/2020     No results found for: HGBA1C  Lab Results   Component Value Date    WBC 8.62 04/24/2020    HGB 11.0 (L) 05/11/2020    HCT 36.6 05/11/2020     (H) 04/24/2020       ASSESSMENT:   Diagnosis Plan   1. Paroxysmal atrial fibrillation (CMS/HCC)     2. Pericardial effusion     3. Pulmonary hypertension (CMS/HCC)   "       PLAN:  1. Paroxysmal A. fib noted while hospitalized with respiratory infection short-lived continued suppression with beta-blockade.  Patient poor candidate for systemic anticoagulation  2. Pericardial effusion acute on chronic with no tamponade features and while hospitalized acceptable hemodynamics today  3. Pulmonary hypertension was secondary to underlying emphysematous changes concern for potential recurrent infections with aspiration given concomitant esophageal dysmotility syndrome      Bassam Leroy MD, FACC

## 2020-06-02 ENCOUNTER — APPOINTMENT (OUTPATIENT)
Dept: PREADMISSION TESTING | Facility: HOSPITAL | Age: 70
End: 2020-06-02

## 2020-06-02 PROCEDURE — C9803 HOPD COVID-19 SPEC COLLECT: HCPCS

## 2020-06-02 PROCEDURE — U0002 COVID-19 LAB TEST NON-CDC: HCPCS

## 2020-06-02 PROCEDURE — U0004 COV-19 TEST NON-CDC HGH THRU: HCPCS

## 2020-06-03 LAB
REF LAB TEST METHOD: NORMAL
SARS-COV-2 RNA RESP QL NAA+PROBE: NOT DETECTED

## 2020-06-05 ENCOUNTER — HOSPITAL ENCOUNTER (OUTPATIENT)
Dept: GENERAL RADIOLOGY | Facility: HOSPITAL | Age: 70
Discharge: HOME | End: 2020-06-05
Admitting: INTERNAL MEDICINE

## 2020-06-05 DIAGNOSIS — R13.10 DYSPHAGIA, UNSPECIFIED TYPE: Primary | ICD-10-CM

## 2020-06-05 DIAGNOSIS — T17.908D ASPIRATION INTO AIRWAY, SUBSEQUENT ENCOUNTER: ICD-10-CM

## 2020-06-05 PROCEDURE — 63710000001 BARIUM SULFATE 40 % RECONSTITUTED SUSPENSION: Performed by: INTERNAL MEDICINE

## 2020-06-05 PROCEDURE — 74230 X-RAY XM SWLNG FUNCJ C+: CPT

## 2020-06-05 PROCEDURE — 63710000001 BARIUM SULFATE 40 % PASTE: Performed by: INTERNAL MEDICINE

## 2020-06-05 PROCEDURE — 92611 MOTION FLUOROSCOPY/SWALLOW: CPT

## 2020-06-05 PROCEDURE — A9270 NON-COVERED ITEM OR SERVICE: HCPCS | Performed by: INTERNAL MEDICINE

## 2020-06-05 RX ADMIN — BARIUM SULFATE 100 ML: 0.81 POWDER, FOR SUSPENSION ORAL at 10:28

## 2020-06-05 RX ADMIN — BARIUM SULFATE 20 ML: 400 PASTE ORAL at 10:28

## 2020-06-05 NOTE — MBS/VFSS/FEES
Outpatient Speech Language Pathology   Adult Swallow Initial Evaluation   Modified Barium Swallow Study (MBS)  FREDERICK Montero     Patient Name: Ita Pickett  : 1950  MRN: [id number]  Today's Date: 2020         Visit Date: 2020   Patient Active Problem List   Diagnosis   • COPD (chronic obstructive pulmonary disease) (CMS/HCC)   • Achalasia   • History of lung cancer   • Breast cancer (CMS/HCC)   • History of esophageal stricture   • Esophageal dysphagia   • History of radiation therapy   • Pain around PEG tube site   • Hodgkin lymphoma (CMS/HCC)   • Dyspnea   • SVT (supraventricular tachycardia) (CMS/HCC)   • Pericardial effusion   • Acquired gastric fistula   • Gastrocutaneous fistula   • Iron deficiency anemia   • Thrombocytosis (CMS/HCC)   • Aspiration into airway        Past Medical History:   Diagnosis Date   • Breast cancer (CMS/HCC)    • Cancer (CMS/HCC)     lung   • Colon polyp    • COPD (chronic obstructive pulmonary disease) (CMS/HCC)    • Disease of thyroid gland    • Dysrhythmia     tachycardia   • Frequent PVCs     bigeminal pvc   • GERD (gastroesophageal reflux disease)    • History of pneumonia    • Hodgkin's disease (CMS/HCC)    • Hypertension    • Lung cancer (CMS/HCC)    • Ovarian cancer (CMS/HCC)         Past Surgical History:   Procedure Laterality Date   • BREAST LUMPECTOMY     • CATARACT EXTRACTION     • CATARACT EXTRACTION W/ INTRAOCULAR LENS IMPLANTW/ TRABECULECTOMY     • COLONOSCOPY      limited due to scar tissue more thatn 10 years ago   • ENDOSCOPY     • ENDOSCOPY N/A 3/19/2020    Procedure: ESOPHAGOGASTRODUODENOSCOPY WITH ARGON PLASMA COAGULATOR;  Surgeon: Felice Mcintosh MD;  Location: [facility] ENDOSCOPY;  Service: Gastroenterology;  Laterality: N/A;   • ENDOSCOPY N/A 2020    Procedure: ESOPHAGOGASTRODUODENOSCOPY;  Surgeon: Felice Mcintosh MD;  Location: [facility] ENDOSCOPY;  Service: Gastroenterology;  Laterality: N/A;  APC was used to  "remove ovesco clip and another one was placed.    • ENDOSCOPY N/A 5/11/2020    Procedure: ESOPHAGOGASTRODUODENOSCOPY;  Surgeon: Felice Mcintosh MD;  Location: [facility] ENDOSCOPY;  Service: Gastroenterology;  Laterality: N/A;  balloon dilation done using 18-20 balloon   • GASTROSTOMY FEEDING TUBE INSERTION  2018    [facility]   • HIP ARTHROSCOPY Right 2011    total hip replacement    • HYSTERECTOMY     • LUNG SURGERY  2000   • RETINAL DETACHMENT REPAIR  2015   • SPLENECTOMY  1980   • THYROIDECTOMY  2012    partial   • UPPER GASTROINTESTINAL ENDOSCOPY  2018 x 3    esophageal strictures by [facility]         Visit Dx:     ICD-10-CM ICD-9-CM   1. Dysphagia, unspecified type R13.10 787.20   2. Aspiration into airway, subsequent encounter T17.908D V58.89     934.9           SLP Adult Swallow Evaluation     Row Name 06/05/20 1000       General Information    Pertinent History Of Current Problem  MBS during admit March '20 revealed aspiration of thin liquids, dsicharged on nectar-thick liquids, has been completing pharyngeal strengthening exercises. Hx esophageal stricture with dilations. Advanced to essentially regular diet per GI MD. Pt reports has to chew up thoroughly though still senses \"sticking\" and difficulties with solids and sometimes even liquids. Plan for repeat EGD with likely dilation prn.   -SM    Current Method of Nutrition  regular textures;nectar/syrup-thick liquids  -SM       MBS/VFSS    Utensils Used  spoon;cup;straw  -SM    Consistencies Trialed  thin liquids;pureed;regular textures  -SM       MBS/VFSS Interpretation    Oral Prep Phase  WFL  -SM    Oral Transit Phase  WFL  -SM    Oral Residue  WFL  -SM       Initiation of Pharyngeal Swallow    Pharyngeal Phase  functional pharyngeal phase of swallowing  -SM    Pharyngeal Phase, Comment  Pt wondering whether or not should continue pharyngeal dysphagia exercises indefinitely to prevent return of issues. Given hx radiation tx, may " be of benefit to continue for maintenance purposes (though difficulties with admit may also simply be attributed to significantly increased generalized weakness). Recommendations to reflect suggested maintenance home exercise program.  -SM       Esophageal Phase    Esophageal Phase  esophageal retention  -SM    Esophageal Phase, Comment  Given limitations of positioning in MBS, could not visualize GE junction. Though did observe esophageal retention to upper esophagus.   -SM       Clinical Impression    SLP Swallowing Diagnosis  functional oral phase;functional pharyngeal phase;other (see comments) known esophageal dysphagia  -    Swallow Criteria for Skilled Therapeutic Interventions Met  no problems identified which require skilled intervention  -       Recommendations    SLP Diet Recommendation  regular textures;thin liquids  -    Recommended Precautions and Strategies  upright posture during/after eating;other (see comments) general esophageal dysphagia precautions  -    SLP Rec. for Method of Medication Administration  as tolerated  -    SLP Tx Recommendation  Home exercise program to maintain current/baseline pharyngeal strength. Copy of exercises provided and reviewed with patient. To include: jaw/neck stretching, chin tuck against resistance, super-supraglottic swallow maneuver, and tongue press + effortful swallow. Patient has our contact information should she have any further questions or concerns.   -      User Key  (r) = Recorded By, (t) = Taken By, (c) = Cosigned By    Initials Name Provider Type    Liss Gonzales MS CCC-SLP Speech and Language Pathologist            OP SLP Education     Row Name 06/05/20 3023       Education    Barriers to Learning  No barriers identified  -    Education Provided  Described results of evaluation;Patient expressed understanding of evaluation;Patient participated in establishing goals and treatment plan  -SM    Assessed  Learning needs;Learning  motivation;Learning preferences;Learning readiness  -    Learning Motivation  Strong  -SM    Learning Method  Explanation;Demonstration;Teach back;Written materials  -    Teaching Response  Verbalized understanding;Demonstrated understanding  -      User Key  (r) = Recorded By, (t) = Taken By, (c) = Cosigned By    Initials Name Effective Dates    Liss Gonzales MS CCC-SLP 06/22/15 -             Time Calculation:   SLP Start Time: 1000    Therapy Charges for Today     Code Description Service Date Service Provider Modifiers Qty    [id number] HC ST MOTION FLUORO EVAL SWALLOW 4 6/5/2020 Liss Harrell MS CCC-SLP GN 1                   Liss M. Moynahan, MS CCC-SLP  6/5/2020

## 2020-06-08 ENCOUNTER — APPOINTMENT (OUTPATIENT)
Dept: PREADMISSION TESTING | Facility: HOSPITAL | Age: 70
End: 2020-06-08

## 2020-06-10 ENCOUNTER — OFFICE VISIT (OUTPATIENT)
Dept: PULMONOLOGY | Facility: CLINIC | Age: 70
End: 2020-06-10

## 2020-06-10 VITALS
BODY MASS INDEX: 20.7 KG/M2 | HEART RATE: 94 BPM | HEIGHT: 64 IN | RESPIRATION RATE: 18 BRPM | WEIGHT: 121.25 LBS | TEMPERATURE: 98.2 F | OXYGEN SATURATION: 96 % | SYSTOLIC BLOOD PRESSURE: 124 MMHG | DIASTOLIC BLOOD PRESSURE: 70 MMHG

## 2020-06-10 DIAGNOSIS — J45.50 SEVERE PERSISTENT ASTHMA, UNSPECIFIED WHETHER COMPLICATED: Primary | ICD-10-CM

## 2020-06-10 DIAGNOSIS — I31.39 PERICARDIAL EFFUSION: ICD-10-CM

## 2020-06-10 DIAGNOSIS — R06.09 DYSPNEA ON EXERTION: ICD-10-CM

## 2020-06-10 DIAGNOSIS — F17.211 CIGARETTE NICOTINE DEPENDENCE IN REMISSION: ICD-10-CM

## 2020-06-10 LAB
ALBUMIN SERPL-MCNC: 3.7 G/DL (ref 3.5–5.2)
ALBUMIN/GLOB SERPL: 0.9 G/DL
ALP SERPL-CCNC: 86 U/L (ref 39–117)
ALT SERPL W P-5'-P-CCNC: 9 U/L (ref 1–33)
ANION GAP SERPL CALCULATED.3IONS-SCNC: 11.2 MMOL/L (ref 5–15)
AST SERPL-CCNC: 17 U/L (ref 1–32)
BASOPHILS # BLD AUTO: 0.05 10*3/MM3 (ref 0–0.2)
BASOPHILS NFR BLD AUTO: 0.6 % (ref 0–1.5)
BILIRUB SERPL-MCNC: 0.2 MG/DL (ref 0.2–1.2)
BUN BLD-MCNC: 16 MG/DL (ref 8–23)
BUN/CREAT SERPL: 22.9 (ref 7–25)
CALCIUM SPEC-SCNC: 9.6 MG/DL (ref 8.6–10.5)
CHLORIDE SERPL-SCNC: 103 MMOL/L (ref 98–107)
CO2 SERPL-SCNC: 27.8 MMOL/L (ref 22–29)
CREAT BLD-MCNC: 0.7 MG/DL (ref 0.57–1)
DEPRECATED RDW RBC AUTO: 53.7 FL (ref 37–54)
EOSINOPHIL # BLD AUTO: 0.21 10*3/MM3 (ref 0–0.4)
EOSINOPHIL NFR BLD AUTO: 2.7 % (ref 0.3–6.2)
ERYTHROCYTE [DISTWIDTH] IN BLOOD BY AUTOMATED COUNT: 17.3 % (ref 12.3–15.4)
GFR SERPL CREATININE-BSD FRML MDRD: 83 ML/MIN/1.73
GLOBULIN UR ELPH-MCNC: 3.9 GM/DL
GLUCOSE BLD-MCNC: 120 MG/DL (ref 65–99)
HCT VFR BLD AUTO: 35.3 % (ref 34–46.6)
HGB BLD-MCNC: 10.9 G/DL (ref 12–15.9)
IMM GRANULOCYTES # BLD AUTO: 0.02 10*3/MM3 (ref 0–0.05)
IMM GRANULOCYTES NFR BLD AUTO: 0.3 % (ref 0–0.5)
LYMPHOCYTES # BLD AUTO: 2.06 10*3/MM3 (ref 0.7–3.1)
LYMPHOCYTES NFR BLD AUTO: 26.2 % (ref 19.6–45.3)
MCH RBC QN AUTO: 26.1 PG (ref 26.6–33)
MCHC RBC AUTO-ENTMCNC: 30.9 G/DL (ref 31.5–35.7)
MCV RBC AUTO: 84.4 FL (ref 79–97)
MONOCYTES # BLD AUTO: 0.76 10*3/MM3 (ref 0.1–0.9)
MONOCYTES NFR BLD AUTO: 9.7 % (ref 5–12)
NEUTROPHILS # BLD AUTO: 4.76 10*3/MM3 (ref 1.7–7)
NEUTROPHILS NFR BLD AUTO: 60.5 % (ref 42.7–76)
NRBC BLD AUTO-RTO: 0.1 /100 WBC (ref 0–0.2)
NT-PROBNP SERPL-MCNC: 734.9 PG/ML (ref 5–900)
PLATELET # BLD AUTO: 499 10*3/MM3 (ref 140–450)
PMV BLD AUTO: 10.6 FL (ref 6–12)
POTASSIUM BLD-SCNC: 4 MMOL/L (ref 3.5–5.2)
PROT SERPL-MCNC: 7.6 G/DL (ref 6–8.5)
RBC # BLD AUTO: 4.18 10*6/MM3 (ref 3.77–5.28)
SODIUM BLD-SCNC: 142 MMOL/L (ref 136–145)
WBC NRBC COR # BLD: 7.86 10*3/MM3 (ref 3.4–10.8)

## 2020-06-10 PROCEDURE — 86003 ALLG SPEC IGE CRUDE XTRC EA: CPT | Performed by: INTERNAL MEDICINE

## 2020-06-10 PROCEDURE — 99214 OFFICE O/P EST MOD 30 MIN: CPT | Performed by: INTERNAL MEDICINE

## 2020-06-10 PROCEDURE — 82785 ASSAY OF IGE: CPT | Performed by: INTERNAL MEDICINE

## 2020-06-10 PROCEDURE — 80053 COMPREHEN METABOLIC PANEL: CPT | Performed by: INTERNAL MEDICINE

## 2020-06-10 PROCEDURE — 85025 COMPLETE CBC W/AUTO DIFF WBC: CPT | Performed by: INTERNAL MEDICINE

## 2020-06-10 PROCEDURE — 36415 COLL VENOUS BLD VENIPUNCTURE: CPT | Performed by: INTERNAL MEDICINE

## 2020-06-10 PROCEDURE — 83880 ASSAY OF NATRIURETIC PEPTIDE: CPT | Performed by: INTERNAL MEDICINE

## 2020-06-10 RX ORDER — MONTELUKAST SODIUM 10 MG/1
10 TABLET ORAL
Qty: 90 TABLET | Refills: 10 | Status: SHIPPED | OUTPATIENT
Start: 2020-06-10 | End: 2020-10-15 | Stop reason: SDUPTHER

## 2020-06-10 NOTE — PROGRESS NOTES
CC:    Lung nodules / COPD    HPI:    66 y/o WF w/ complex PMH:  120 py smoking quit 2010, COPD, NSCLC s/p resection RLL 2000 (presumeably early stage - no chemo/RT), Hodgkins Lymphoma 1980 s/p chest RT and spleenectomy, Ovarian Ca 1975 s/p RT, hypothyroidism, achalasia referred by Dr. Pelletier for lung nodules.  She recently had an esophageal dilation followed by CT chest in Feb that showed achalasia.  In addition the CT scan showed several sub-centimeter scattered nodules.  Patient quit smoking as mentioned above in 2010.  She does report significant ZHONG with minimal activity (showering, dressing, ADL's, walking up steps).  Does ok walking on a flat surface.  No other complaints.    Repeat CT showed a chest wall based cavitary RML lesion.  Radiology recommended CT guided biopsy.  She went for this and the lesion decreased in size so the biopsy was cancelled.   She had a PEG placed for her severe esophageal stenosis and continues to get repeat dilations.    PEG tube has been removed.  Patient has had persistent fistula that was closed endoscopically.      INTERVAL HISTORY:    Patient returns for follow up.  Using pulmicort and brovana but pulmicort is the wrong dose and only once daily.  Severely short of breath.  Had an admission for shortness of breath, felt to be AECOPD compounded by SVT, Afib, lactic acidosis, and large new pericardial effusion.      She remains short of breath.  Also has some allergic symptoms.    PMH:    Past Medical History:   Diagnosis Date   • Breast cancer (CMS/HCC)    • Cancer (CMS/HCC)     lung   • Colon polyp    • COPD (chronic obstructive pulmonary disease) (CMS/HCC)    • Disease of thyroid gland    • Dysrhythmia     tachycardia   • Frequent PVCs     bigeminal pvc   • GERD (gastroesophageal reflux disease)    • History of pneumonia    • Hodgkin's disease (CMS/HCC)    • Hypertension    • Lung cancer (CMS/HCC)    • Ovarian cancer (CMS/HCC)      PSH:    Past Surgical History:   Procedure  Laterality Date   • BREAST LUMPECTOMY     • CATARACT EXTRACTION     • CATARACT EXTRACTION W/ INTRAOCULAR LENS IMPLANTW/ TRABECULECTOMY     • COLONOSCOPY      limited due to scar tissue more thatn 10 years ago   • ENDOSCOPY     • ENDOSCOPY N/A 3/19/2020    Procedure: ESOPHAGOGASTRODUODENOSCOPY WITH ARGON PLASMA COAGULATOR;  Surgeon: Felice Mcintosh MD;  Location:  [facility];  Service: Gastroenterology;  Laterality: N/A;   • ENDOSCOPY N/A 4/7/2020    Procedure: ESOPHAGOGASTRODUODENOSCOPY;  Surgeon: Felice Mcintosh MD;  Location:  [facility];  Service: Gastroenterology;  Laterality: N/A;  APC was used to remove ovesco clip and another one was placed.    • ENDOSCOPY N/A 5/11/2020    Procedure: ESOPHAGOGASTRODUODENOSCOPY;  Surgeon: Felice Mcintosh MD;  Location:  [facility];  Service: Gastroenterology;  Laterality: N/A;  balloon dilation done using 18-20 balloon   • GASTROSTOMY FEEDING TUBE INSERTION  2018    [facility]   • HIP ARTHROSCOPY Right 2011    total hip replacement    • HYSTERECTOMY     • LUNG SURGERY  2000   • RETINAL DETACHMENT REPAIR  2015   • SPLENECTOMY  1980   • THYROIDECTOMY  2012    partial   • UPPER GASTROINTESTINAL ENDOSCOPY  2018 x 3    esophageal strictures by [facility]     FH:    Family History   Problem Relation Age of Onset   • Cancer Mother    • Lung cancer Mother    • Heart disease Father    • Heart disease Brother    • Breast cancer Maternal Grandmother    • Colon polyps Sister    • Colon cancer Neg Hx      SH:    Social History     Socioeconomic History   • Marital status:      Spouse name: Not on file   • Number of children: Not on file   • Years of education: Not on file   • Highest education level: Not on file   Tobacco Use   • Smoking status: Former Smoker     Packs/day: 3.00     Years: 40.00     Pack years: 120.00     Types: Cigarettes     Last attempt to quit: 5/10/2010     Years since quitting: 10.0   •  Smokeless tobacco: Never Used   Substance and Sexual Activity   • Alcohol use: Yes     Comment: Socially    • Drug use: Yes     Types: Marijuana     Comment: brownie   • Sexual activity: Defer     ALLERGIES:    No Known Allergies  MEDICATIONS:      Current Outpatient Medications:   •  budesonide (PULMICORT) 0.25 MG/2ML nebulizer solution, Take  by nebulization Daily., Disp: , Rfl:   •  diazePAM (VALIUM) 5 MG tablet, if needed., Disp: , Rfl:   •  fluticasone (FLONASE) 50 MCG/ACT nasal spray, 2 sprays into the nostril(s) as directed by provider 2 (Two) Times a Day., Disp: 1 bottle, Rfl: 11  •  levocetirizine (XYZAL) 5 MG tablet, Take 1 tablet by mouth Every Evening., Disp: 30 tablet, Rfl: 11  •  metoprolol tartrate (LOPRESSOR) 50 MG tablet, Take 1 tablet by mouth 2 (Two) Times a Day., Disp: , Rfl:   •  montelukast (SINGULAIR) 10 MG tablet, TAKE ONE TABLET BY MOUTH EVERY NIGHT, Disp: 90 tablet, Rfl: 10  •  omeprazole (priLOSEC) 20 MG capsule, Take 20 mg by mouth Daily., Disp: , Rfl:   •  traZODone (DESYREL) 100 MG tablet, Take 100 mg by mouth As Needed., Disp: , Rfl:   •  vitamin C (ASCORBIC ACID) 500 MG tablet, Take 500 mg by mouth 2 (Two) Times a Day., Disp: , Rfl:   ROS:  Per HPI, otherwise all systems reviewed and negative.    DIAGNOSTIC DATA (Reviewed and interpreted by me unless otherwise specified):    PFT 5/10/18 - severe obstruction, no change w/ BD, air trapping, normal DLCO    PFT 3/4/20 - very severe obstruction, + air trapping, normal DLCO    CT Chest 2/21/18 - prior RLL lobectomy, radiation changes near mediastinum bilateral upper lobes, emphysema, multiple sub-centimeter nodules, achalasia w/ fluid filled esophagus, prior spleenectomy    CT Chest 2/6/19 - stable from prior, sub-centimeter nodules stable    CT Chest 2/6/20 - stable nodules, other findings stable        Vitals:    06/10/20 0948   BP: 124/70   Pulse: 94   Resp: 18   Temp: 98.2 °F (36.8 °C)   SpO2: 96%       Physical Exam   Constitutional:  Oriented to person, place, and time. Appears cachectic.  Head: Normocephalic and atraumatic.   Nose: Nose normal.   Mouth/Throat: normal.  Eyes: Conjunctivae are normal.  Pupils normal.  Neck: No tracheal deviation present.   Cardiovascular: Normal rate, regular rhythm, normal heart sounds and intact distal pulses.  Exam reveals no gallop and no friction rub.  No thrill.  No JVD.  No edema.  No murmur heard.  Pulmonary/Chest: Effort normal and breath sounds with prolonged expiratory phase.  No tenderness to palpation.  No clubbing.   Abdominal: Soft. Bowel sounds are normal. No distension. No tenderness. There is no guarding.   Musculoskeletal: Normal range of motion.  No tenderness.  Lymphadenopathy:  No cervical adenopathy.   Neurological:  No new focal neurological deficits observed   Skin: Skin is warm and dry. No rash noted.   Psychiatric: Normal mood and affect.  Behavior is normal. Judgment normal.    Assessment/Plan     1)  GOLD 4D COPD w/ Asthma Overlap - fix prescription to pulmicort neb 0.5 mg bid, continue brovana, add spiriva - given samples.  Check markers of allergic inflammation.  Low threshold for biologic.  High risk for pneumonia w/ achalasia and fluid filled esophagus.    2)  Dyspnea - secondary to above.  However, I think it is imperative to make sure that pericardial effusion is not enlarging.  Will get Echo and refer to CTS.  Numerous malignancies in past, without clear explanation malignant pericardial effusion is a possibility.    3)  Lung Nodules - sub-centimeter, stable x 2 years.  Given smoking history she still qualifies for lung cancer screening.  Due for next scan 3/2021.    4)  Allergic Rhinitis - flonase, anti-histamine, singulair    RTC 3 months w/ Full PFT and 6MW    Will call with results of Echo and if we need to start biologic for asthma    Gage Collier MD  Pulmonology and Critical Care Medicine  06/10/20 10:46  Electronically Signed    C.C.:  Erik Pelletier MD,  Lucho Peters MD

## 2020-06-15 ENCOUNTER — TELEPHONE (OUTPATIENT)
Dept: PULMONOLOGY | Facility: CLINIC | Age: 70
End: 2020-06-15

## 2020-06-15 DIAGNOSIS — J44.9 CHRONIC OBSTRUCTIVE PULMONARY DISEASE, UNSPECIFIED COPD TYPE (HCC): Primary | ICD-10-CM

## 2020-06-15 LAB
A ALTERNATA IGE QN: <0.1 KU/L
A FUMIGATUS IGE QN: <0.1 KU/L
AMER ROACH IGE QN: <0.1 KU/L
BAHIA GRASS IGE QN: 0.12 KU/L
BERMUDA GRASS IGE QN: <0.1 KU/L
BOXELDER IGE QN: <0.1 KU/L
C HERBARUM IGE QN: <0.1 KU/L
CAT DANDER IGG QN: <0.1 KU/L
CMN PIGWEED IGE QN: <0.1 KU/L
COMMON RAGWEED IGE QN: <0.1 KU/L
CONV CLASS DESCRIPTION: ABNORMAL
D FARINAE IGE QN: <0.1 KU/L
D PTERONYSS IGE QN: <0.1 KU/L
DOG DANDER IGE QN: <0.1 KU/L
ENGL PLANTAIN IGE QN: <0.1 KU/L
HAZELNUT POLN IGE QN: <0.1 KU/L
JOHNSON GRASS IGE QN: 0.13 KU/L
KENT BLUE GRASS IGE QN: 0.18 KU/L
M RACEMOSUS IGE QN: <0.1 KU/L
MT JUNIPER IGE QN: <0.1 KU/L
MUGWORT IGE QN: <0.1 KU/L
NETTLE IGE QN: <0.1 KU/L
P NOTATUM IGE QN: <0.1 KU/L
S BOTRYOSUM IGE QN: <0.1 KU/L
SHEEP SORREL IGE QN: <0.1 KU/L
SWEET GUM IGE QN: <0.1 KU/L
T011-IGE MAPLE LEAF SYCAMORE: <0.1 KU/L
TOTAL IGE SMQN RAST: 140 IU/ML (ref 6–495)
WHITE ELM IGE QN: <0.1 KU/L
WHITE HICKORY IGE QN: 0.3 KU/L
WHITE MULBERRY IGE QN: <0.1 KU/L
WHITE OAK IGE QN: <0.1 KU/L

## 2020-06-17 ENCOUNTER — HOSPITAL ENCOUNTER (OUTPATIENT)
Dept: CARDIOLOGY | Facility: HOSPITAL | Age: 70
Discharge: HOME | End: 2020-06-17
Admitting: INTERNAL MEDICINE

## 2020-06-17 VITALS — WEIGHT: 121 LBS | HEIGHT: 64 IN | BODY MASS INDEX: 20.66 KG/M2

## 2020-06-17 DIAGNOSIS — I31.39 PERICARDIAL EFFUSION: ICD-10-CM

## 2020-06-17 PROCEDURE — 93306 TTE W/DOPPLER COMPLETE: CPT | Performed by: INTERNAL MEDICINE

## 2020-06-17 PROCEDURE — 93306 TTE W/DOPPLER COMPLETE: CPT

## 2020-06-18 LAB
BH CV ECHO MEAS - AI DEC SLOPE: 383.3 CM/SEC^2
BH CV ECHO MEAS - AI MAX PG: 82 MMHG
BH CV ECHO MEAS - AI MAX VEL: 452.9 CM/SEC
BH CV ECHO MEAS - AI P1/2T: 346 MSEC
BH CV ECHO MEAS - AO MAX PG (FULL): 14.3 MMHG
BH CV ECHO MEAS - AO MAX PG: 18.4 MMHG
BH CV ECHO MEAS - AO MEAN PG (FULL): 6.5 MMHG
BH CV ECHO MEAS - AO MEAN PG: 8.4 MMHG
BH CV ECHO MEAS - AO ROOT AREA (BSA CORRECTED): 2.3
BH CV ECHO MEAS - AO ROOT AREA: 10.1 CM^2
BH CV ECHO MEAS - AO ROOT DIAM: 3.6 CM
BH CV ECHO MEAS - AO V2 MAX: 214.7 CM/SEC
BH CV ECHO MEAS - AO V2 MEAN: 133.6 CM/SEC
BH CV ECHO MEAS - AO V2 VTI: 40 CM
BH CV ECHO MEAS - AVA(I,A): 1.5 CM^2
BH CV ECHO MEAS - AVA(I,D): 1.5 CM^2
BH CV ECHO MEAS - AVA(V,A): 1.5 CM^2
BH CV ECHO MEAS - AVA(V,D): 1.5 CM^2
BH CV ECHO MEAS - BSA(HAYCOCK): 1.6 M^2
BH CV ECHO MEAS - BSA: 1.6 M^2
BH CV ECHO MEAS - BZI_BMI: 20.8 KILOGRAMS/M^2
BH CV ECHO MEAS - BZI_METRIC_HEIGHT: 162.6 CM
BH CV ECHO MEAS - BZI_METRIC_WEIGHT: 54.9 KG
BH CV ECHO MEAS - EDV(CUBED): 57.2 ML
BH CV ECHO MEAS - EDV(MOD-SP2): 58 ML
BH CV ECHO MEAS - EDV(MOD-SP4): 67 ML
BH CV ECHO MEAS - EDV(TEICH): 64.1 ML
BH CV ECHO MEAS - EF(CUBED): 56.7 %
BH CV ECHO MEAS - EF(MOD-SP2): 46.6 %
BH CV ECHO MEAS - EF(MOD-SP4): 64.2 %
BH CV ECHO MEAS - EF(TEICH): 49 %
BH CV ECHO MEAS - EF{MOD-BP}: 57 %
BH CV ECHO MEAS - ESV(CUBED): 24.8 ML
BH CV ECHO MEAS - ESV(MOD-SP2): 31 ML
BH CV ECHO MEAS - ESV(MOD-SP4): 24 ML
BH CV ECHO MEAS - ESV(TEICH): 32.7 ML
BH CV ECHO MEAS - FS: 24.3 %
BH CV ECHO MEAS - IVS/LVPW: 1.1
BH CV ECHO MEAS - IVSD: 0.92 CM
BH CV ECHO MEAS - LA DIMENSION: 3.8 CM
BH CV ECHO MEAS - LA/AO: 1.1
BH CV ECHO MEAS - LAD MAJOR: 4.7 CM
BH CV ECHO MEAS - LAT PEAK E' VEL: 8.1 CM/SEC
BH CV ECHO MEAS - LV DIASTOLIC VOL/BSA (35-75): 42.4 ML/M^2
BH CV ECHO MEAS - LV MASS(C)D: 99.4 GRAMS
BH CV ECHO MEAS - LV MASS(C)DI: 62.9 GRAMS/M^2
BH CV ECHO MEAS - LV MAX PG: 4.2 MMHG
BH CV ECHO MEAS - LV MEAN PG: 2 MMHG
BH CV ECHO MEAS - LV SYSTOLIC VOL/BSA (12-30): 15.2 ML/M^2
BH CV ECHO MEAS - LV V1 MAX: 101.8 CM/SEC
BH CV ECHO MEAS - LV V1 MEAN: 63.7 CM/SEC
BH CV ECHO MEAS - LV V1 VTI: 18.4 CM
BH CV ECHO MEAS - LVIDD: 3.9 CM
BH CV ECHO MEAS - LVIDS: 2.9 CM
BH CV ECHO MEAS - LVLD AP2: 7.4 CM
BH CV ECHO MEAS - LVLD AP4: 8.1 CM
BH CV ECHO MEAS - LVLS AP2: 6 CM
BH CV ECHO MEAS - LVLS AP4: 5.7 CM
BH CV ECHO MEAS - LVOT AREA (M): 3.1 CM^2
BH CV ECHO MEAS - LVOT AREA: 3.2 CM^2
BH CV ECHO MEAS - LVOT DIAM: 2 CM
BH CV ECHO MEAS - LVPWD: 0.83 CM
BH CV ECHO MEAS - MED PEAK E' VEL: 7 CM/SEC
BH CV ECHO MEAS - MV MAX PG: 6.1 MMHG
BH CV ECHO MEAS - MV MEAN PG: 3 MMHG
BH CV ECHO MEAS - MV V2 MAX: 123.8 CM/SEC
BH CV ECHO MEAS - MV V2 MEAN: 81.5 CM/SEC
BH CV ECHO MEAS - MV V2 VTI: 30.1 CM
BH CV ECHO MEAS - MVA(VTI): 1.9 CM^2
BH CV ECHO MEAS - PA ACC SLOPE: 515.3 CM/SEC^2
BH CV ECHO MEAS - PA ACC TIME: 0.13 SEC
BH CV ECHO MEAS - PA PR(ACCEL): 20.4 MMHG
BH CV ECHO MEAS - RAP SYSTOLE: 8 MMHG
BH CV ECHO MEAS - RVSP: 61 MMHG
BH CV ECHO MEAS - SI(AO): 255.5 ML/M^2
BH CV ECHO MEAS - SI(CUBED): 20.5 ML/M^2
BH CV ECHO MEAS - SI(LVOT): 36.9 ML/M^2
BH CV ECHO MEAS - SI(MOD-SP2): 17.1 ML/M^2
BH CV ECHO MEAS - SI(MOD-SP4): 27.2 ML/M^2
BH CV ECHO MEAS - SI(TEICH): 19.9 ML/M^2
BH CV ECHO MEAS - SV(AO): 403.7 ML
BH CV ECHO MEAS - SV(CUBED): 32.4 ML
BH CV ECHO MEAS - SV(LVOT): 58.3 ML
BH CV ECHO MEAS - SV(MOD-SP2): 27 ML
BH CV ECHO MEAS - SV(MOD-SP4): 43 ML
BH CV ECHO MEAS - SV(TEICH): 31.4 ML
BH CV ECHO MEAS - TAPSE (>1.6): 1.5 CM2
BH CV ECHO MEAS - TR MAX PG: 53 MMHG
BH CV ECHO MEAS - TR MAX VEL: 362.6 CM/SEC
BH CV VAS BP LEFT ARM: NORMAL MMHG
BH CV XLRA - RV BASE: 3.3 CM
BH CV XLRA - RV LENGTH: 6.3 CM
BH CV XLRA - RV MID: 2.1 CM
BH CV XLRA - TDI S': 10.1 CM/SEC
LEFT ATRIUM VOLUME INDEX: 20.9 ML/M^2
LEFT ATRIUM VOLUME: 33 ML
LV EF 2D ECHO EST: 60 %
MAXIMAL PREDICTED HEART RATE: 150 BPM
STRESS TARGET HR: 128 BPM

## 2020-06-19 ENCOUNTER — TELEPHONE (OUTPATIENT)
Dept: PULMONOLOGY | Facility: CLINIC | Age: 70
End: 2020-06-19

## 2020-06-25 ENCOUNTER — TELEPHONE (OUTPATIENT)
Dept: CARDIAC SURGERY | Facility: CLINIC | Age: 70
End: 2020-06-25

## 2020-06-25 NOTE — TELEPHONE ENCOUNTER
PT CANCELLED APPT ON 6/22/20 WITH , STATING THAT PER  SHE DOES NOT NEED TO BE SEEN DUE TO HER ECHO ON 6/10 BEING NORMAL.

## 2020-07-06 ENCOUNTER — DOCUMENTATION (OUTPATIENT)
Dept: PULMONOLOGY | Facility: CLINIC | Age: 70
End: 2020-07-06

## 2020-07-07 ENCOUNTER — OFFICE VISIT (OUTPATIENT)
Dept: GASTROENTEROLOGY | Facility: CLINIC | Age: 70
End: 2020-07-07

## 2020-07-07 VITALS
WEIGHT: 120.8 LBS | DIASTOLIC BLOOD PRESSURE: 76 MMHG | BODY MASS INDEX: 20.74 KG/M2 | HEART RATE: 100 BPM | TEMPERATURE: 98.2 F | SYSTOLIC BLOOD PRESSURE: 160 MMHG

## 2020-07-07 DIAGNOSIS — Z87.19 HISTORY OF ESOPHAGEAL STRICTURE: ICD-10-CM

## 2020-07-07 DIAGNOSIS — K31.6 ACQUIRED GASTRIC FISTULA: Primary | ICD-10-CM

## 2020-07-07 DIAGNOSIS — R13.19 ESOPHAGEAL DYSPHAGIA: ICD-10-CM

## 2020-07-07 DIAGNOSIS — D12.6 ADENOMATOUS POLYP OF COLON, UNSPECIFIED PART OF COLON: ICD-10-CM

## 2020-07-07 PROCEDURE — 99213 OFFICE O/P EST LOW 20 MIN: CPT | Performed by: INTERNAL MEDICINE

## 2020-07-07 NOTE — PROGRESS NOTES
GASTROENTEROLOGY OFFICE NOTE  Ita Pickett  [id number]  1950       CARE TEAM  Dr. Lucho Peters.  Primary care provider      Gastroenterologist:  Felice Mcintosh MD    Chief Complaint   Patient presents with   • Follow-up   • Difficulty Swallowing        HISTORY OF PRESENT ILLNESS:  Patient presents for follow-up after refractory post PEG tube removal gastrocutaneous fistula.  Last intervention consisted of mechanical abrasion with a cytology brush of the fistulous track and placement of an OVESCO clip after removal of the previous one.  This was approximately 2 months ago the first week of May and she has had no recurrent drainage from her PEG tube site.    She has dysphagia to solids which is responded to esophageal dilation and she is known to have an esophageal stricture.  She is doing much better although she has some very mild residual dysphagia is nothing like it was before her most recent esophageal dilation.    She is also had a sessile polyp removed from her colon and surveillance is recommended October of this year and at that time she would like to have an upper endoscopy if her dysphagia persists.    Otherwise she seems to be doing well without odynophagia, early satiety unexplained weight loss melena or changes in her usual bowel habits.    PAST MEDICAL HISTORY  Past Medical History:   Diagnosis Date   • Breast cancer (CMS/HCC)    • Cancer (CMS/HCC)     lung   • Colon polyp    • COPD (chronic obstructive pulmonary disease) (CMS/HCC)    • Disease of thyroid gland    • Dysrhythmia     tachycardia   • Frequent PVCs     bigeminal pvc   • GERD (gastroesophageal reflux disease)    • History of pneumonia    • Hodgkin's disease (CMS/HCC)    • Hypertension    • Lung cancer (CMS/HCC)    • Ovarian cancer (CMS/HCC)         PAST SURGICAL HISTORY  Past Surgical History:   Procedure Laterality Date   • BREAST LUMPECTOMY     • CATARACT EXTRACTION     • CATARACT EXTRACTION W/ INTRAOCULAR LENS  IMPLANTW/ TRABECULECTOMY     • COLONOSCOPY      limited due to scar tissue more thatn 10 years ago   • ENDOSCOPY     • ENDOSCOPY N/A 3/19/2020    Procedure: ESOPHAGOGASTRODUODENOSCOPY WITH ARGON PLASMA COAGULATOR;  Surgeon: Felice Mcintosh MD;  Location:  [facility];  Service: Gastroenterology;  Laterality: N/A;   • ENDOSCOPY N/A 4/7/2020    Procedure: ESOPHAGOGASTRODUODENOSCOPY;  Surgeon: Felice Mcintosh MD;  Location:  [facility];  Service: Gastroenterology;  Laterality: N/A;  APC was used to remove ovesco clip and another one was placed.    • ENDOSCOPY N/A 5/11/2020    Procedure: ESOPHAGOGASTRODUODENOSCOPY;  Surgeon: Felice Mcintosh MD;  Location:  [facility];  Service: Gastroenterology;  Laterality: N/A;  balloon dilation done using 18-20 balloon   • GASTROSTOMY FEEDING TUBE INSERTION  2018    [facility]   • HIP ARTHROSCOPY Right 2011    total hip replacement    • HYSTERECTOMY     • LUNG SURGERY  2000   • RETINAL DETACHMENT REPAIR  2015   • SPLENECTOMY  1980   • THYROIDECTOMY  2012    partial   • UPPER GASTROINTESTINAL ENDOSCOPY  2018 x 3    esophageal strictures by [facility]        MEDICATIONS:    Current Outpatient Medications:   •  diazePAM (VALIUM) 5 MG tablet, if needed., Disp: , Rfl:   •  fluticasone (FLONASE) 50 MCG/ACT nasal spray, 2 sprays into the nostril(s) as directed by provider 2 (Two) Times a Day., Disp: 1 bottle, Rfl: 11  •  levocetirizine (XYZAL) 5 MG tablet, Take 1 tablet by mouth Every Evening., Disp: 30 tablet, Rfl: 11  •  metoprolol tartrate (LOPRESSOR) 50 MG tablet, Take 1 tablet by mouth 2 (Two) Times a Day., Disp: , Rfl:   •  montelukast (SINGULAIR) 10 MG tablet, Take 1 tablet by mouth every night at bedtime., Disp: 90 tablet, Rfl: 10  •  omeprazole (priLOSEC) 20 MG capsule, Take 20 mg by mouth Daily., Disp: , Rfl:   •  tiotropium bromide monohydrate (Spiriva Respimat) 2.5 MCG/ACT aerosol solution inhaler, Inhale 2  puffs Daily., Disp: 1 inhaler, Rfl: 11  •  traZODone (DESYREL) 100 MG tablet, Take 100 mg by mouth As Needed., Disp: , Rfl:   •  vitamin C (ASCORBIC ACID) 500 MG tablet, Take 500 mg by mouth 2 (Two) Times a Day., Disp: , Rfl:     ALLERGIES  No Known Allergies    FAMILY HISTORY:  Family History   Problem Relation Age of Onset   • Cancer Mother    • Lung cancer Mother    • Heart disease Father    • Heart disease Brother    • Breast cancer Maternal Grandmother    • Colon polyps Sister    • Colon cancer Neg Hx        SOCIAL HISTORY  Social History     Socioeconomic History   • Marital status:      Spouse name: Not on file   • Number of children: Not on file   • Years of education: Not on file   • Highest education level: Not on file   Tobacco Use   • Smoking status: Former Smoker     Packs/day: 3.00     Years: 40.00     Pack years: 120.00     Types: Cigarettes     Last attempt to quit: 5/10/2010     Years since quitting: 10.1   • Smokeless tobacco: Never Used   Substance and Sexual Activity   • Alcohol use: Yes     Comment: Socially    • Drug use: Yes     Types: Marijuana     Comment: brownie   • Sexual activity: Defer     Socioeconomic History:  .  No children.  Non-smoker.  Rarely drinks alcoholic beverages.  Part-time realtor       REVIEW OF SYSTEMS  Review of Systems   Constitutional: Positive for activity change and fatigue. Negative for appetite change, chills, diaphoresis, fever, unexpected weight gain and unexpected weight loss.   HENT: Positive for trouble swallowing. Negative for voice change.    Gastrointestinal: Positive for constipation. Negative for abdominal distention, abdominal pain, anal bleeding, blood in stool, diarrhea, nausea, rectal pain, vomiting, GERD and indigestion.     I reviewed the above-noted review of systems    PHYSICAL EXAM   /76 (BP Location: Left arm, Patient Position: Sitting, Cuff Size: Adult)   Pulse 100   Temp 98.2 °F (36.8 °C) (Temporal)   Wt 54.8 kg (120 lb  12.8 oz)   BMI 20.74 kg/m²   General: Alert and oriented x 3. In no apparent or acute distress.  and No stigmata of chronic liver disease  HEENT: Anicteric slcera. Normal oropharynx  Neck: Supple. Without lymphadenopathy  CV: Regular rate and rhythm, S1, S2  Lungs: Clear to ausculation. Without rales, rhonchi and wheezing  Abdomen:  Soft,non-distended without palpable masses or hepatosplenomeagaly, areas of rebound tenderness or guarding. Old PEG tube site is dry and without evidence of recurrent drainage  Extremeties: without clubbing, cyanosis or edema  Neurologic:  Alert and oriented x 3 without focal motor or sensory deficits  Rectal exam: deferred          ASSESSMENT  1.-  Sessile adenomatous colonic polyps.  Early surveillance recommended due to the large sessile nature of the sigmoid colon polyp.  Repeat colonoscopy recommended in October.  2.-  Refractory gastrocutaneous fistula which seems to have closed following most recent intervention  3.-  Dysphagia secondary to peptic esophageal stricture.  Minimally symptomatic at this time.  Consider redilation at time of her colonoscopy    PLAN  1.-  Colonoscopy in October this year with possible EGD at that time also for esophageal dilation if clinically indicated (persistent dysphagia to solids)    Felice Mcintosh MD  7/7/2020   15:39

## 2020-07-10 PROBLEM — D12.6 ADENOMATOUS POLYP OF COLON: Status: ACTIVE | Noted: 2020-07-10

## 2020-10-12 ENCOUNTER — LAB (OUTPATIENT)
Dept: PULMONOLOGY | Facility: CLINIC | Age: 70
End: 2020-10-12

## 2020-10-12 DIAGNOSIS — Z01.812 BLOOD TESTS PRIOR TO TREATMENT OR PROCEDURE: Primary | ICD-10-CM

## 2020-10-12 PROCEDURE — U0004 COV-19 TEST NON-CDC HGH THRU: HCPCS | Performed by: INTERNAL MEDICINE

## 2020-10-12 PROCEDURE — 99000 SPECIMEN HANDLING OFFICE-LAB: CPT | Performed by: INTERNAL MEDICINE

## 2020-10-13 LAB — SARS-COV-2 RNA RESP QL NAA+PROBE: NOT DETECTED

## 2020-10-15 ENCOUNTER — OFFICE VISIT (OUTPATIENT)
Dept: PULMONOLOGY | Facility: CLINIC | Age: 70
End: 2020-10-15

## 2020-10-15 VITALS
BODY MASS INDEX: 22.3 KG/M2 | OXYGEN SATURATION: 100 % | HEART RATE: 92 BPM | TEMPERATURE: 97.5 F | SYSTOLIC BLOOD PRESSURE: 124 MMHG | WEIGHT: 130.6 LBS | DIASTOLIC BLOOD PRESSURE: 74 MMHG | HEIGHT: 64 IN

## 2020-10-15 DIAGNOSIS — R09.02 EXERCISE HYPOXEMIA: ICD-10-CM

## 2020-10-15 DIAGNOSIS — G47.34 NOCTURNAL HYPOXEMIA: ICD-10-CM

## 2020-10-15 DIAGNOSIS — J44.9 CHRONIC OBSTRUCTIVE PULMONARY DISEASE, UNSPECIFIED COPD TYPE (HCC): Primary | ICD-10-CM

## 2020-10-15 PROCEDURE — 94726 PLETHYSMOGRAPHY LUNG VOLUMES: CPT | Performed by: INTERNAL MEDICINE

## 2020-10-15 PROCEDURE — 94618 PULMONARY STRESS TESTING: CPT | Performed by: INTERNAL MEDICINE

## 2020-10-15 PROCEDURE — 94060 EVALUATION OF WHEEZING: CPT | Performed by: INTERNAL MEDICINE

## 2020-10-15 PROCEDURE — 94729 DIFFUSING CAPACITY: CPT | Performed by: INTERNAL MEDICINE

## 2020-10-15 PROCEDURE — 99214 OFFICE O/P EST MOD 30 MIN: CPT | Performed by: INTERNAL MEDICINE

## 2020-10-15 RX ORDER — MONTELUKAST SODIUM 10 MG/1
10 TABLET ORAL
Qty: 30 TABLET | Refills: 3 | Status: SHIPPED | OUTPATIENT
Start: 2020-10-15 | End: 2021-03-05 | Stop reason: SDUPTHER

## 2020-10-15 RX ORDER — TIOTROPIUM BROMIDE INHALATION SPRAY 3.12 UG/1
2 SPRAY, METERED RESPIRATORY (INHALATION)
Qty: 1 INHALER | Refills: 11 | Status: SHIPPED | OUTPATIENT
Start: 2020-10-15

## 2020-10-15 RX ORDER — ALBUTEROL SULFATE 90 UG/1
4 AEROSOL, METERED RESPIRATORY (INHALATION) ONCE
Status: COMPLETED | OUTPATIENT
Start: 2020-10-15 | End: 2020-10-15

## 2020-10-15 RX ORDER — ALBUTEROL SULFATE 90 UG/1
2 AEROSOL, METERED RESPIRATORY (INHALATION) EVERY 4 HOURS PRN
Qty: 6.7 G | Refills: 11 | Status: SHIPPED | OUTPATIENT
Start: 2020-10-15 | End: 2021-04-19 | Stop reason: SDUPTHER

## 2020-10-15 RX ORDER — LEVOCETIRIZINE DIHYDROCHLORIDE 5 MG/1
5 TABLET, FILM COATED ORAL EVERY EVENING
Qty: 30 TABLET | Refills: 11 | Status: SHIPPED | OUTPATIENT
Start: 2020-10-15 | End: 2021-03-05 | Stop reason: SDUPTHER

## 2020-10-15 RX ORDER — FLUTICASONE PROPIONATE 50 MCG
2 SPRAY, SUSPENSION (ML) NASAL 2 TIMES DAILY
Qty: 1 BOTTLE | Refills: 11 | Status: SHIPPED | OUTPATIENT
Start: 2020-10-15 | End: 2021-04-19 | Stop reason: SDUPTHER

## 2020-10-15 RX ADMIN — ALBUTEROL SULFATE 4 PUFF: 90 AEROSOL, METERED RESPIRATORY (INHALATION) at 12:03

## 2020-10-15 NOTE — PROGRESS NOTES
CC:    Lung nodules / COPD    HPI:    66 y/o WF w/ complex PMH:  120 py smoking quit 2010, COPD, NSCLC s/p resection RLL 2000 (presumeably early stage - no chemo/RT), Hodgkins Lymphoma 1980 s/p chest RT and spleenectomy, Ovarian Ca 1975 s/p RT, hypothyroidism, achalasia referred by Dr. Pelletier for lung nodules.  She recently had an esophageal dilation followed by CT chest in Feb that showed achalasia.  In addition the CT scan showed several sub-centimeter scattered nodules.  Patient quit smoking as mentioned above in 2010.  She does report significant ZHONG with minimal activity (showering, dressing, ADL's, walking up steps).  Does ok walking on a flat surface.  No other complaints.    Repeat CT showed a chest wall based cavitary RML lesion.  Radiology recommended CT guided biopsy.  She went for this and the lesion decreased in size so the biopsy was cancelled.   She had a PEG placed for her severe esophageal stenosis and continues to get repeat dilations.    PEG tube has been removed.  Patient has had persistent fistula that was closed endoscopically.      INTERVAL HISTORY:    Patient returns for follow up.  Still having some allergic symptoms and wheezing.  Has been gaining weight steadily with improved BMI.    PMH:    Past Medical History:   Diagnosis Date   • Breast cancer (CMS/HCC)    • Cancer (CMS/HCC)     lung   • Colon polyp    • COPD (chronic obstructive pulmonary disease) (CMS/HCC)    • Disease of thyroid gland    • Dysrhythmia     tachycardia   • Frequent PVCs     bigeminal pvc   • GERD (gastroesophageal reflux disease)    • History of pneumonia    • Hodgkin's disease (CMS/HCC)    • Hypertension    • Lung cancer (CMS/HCC)    • Ovarian cancer (CMS/HCC)      PSH:    Past Surgical History:   Procedure Laterality Date   • BREAST LUMPECTOMY     • CATARACT EXTRACTION     • CATARACT EXTRACTION W/ INTRAOCULAR LENS IMPLANTW/ TRABECULECTOMY     • COLONOSCOPY      limited due to scar tissue more thatn 10 years ago   •  ENDOSCOPY     • ENDOSCOPY N/A 3/19/2020    Procedure: ESOPHAGOGASTRODUODENOSCOPY WITH ARGON PLASMA COAGULATOR;  Surgeon: Felice Mcintosh MD;  Location:  [facility];  Service: Gastroenterology;  Laterality: N/A;   • ENDOSCOPY N/A 4/7/2020    Procedure: ESOPHAGOGASTRODUODENOSCOPY;  Surgeon: Felice Mcintosh MD;  Location:  [facility];  Service: Gastroenterology;  Laterality: N/A;  APC was used to remove ovesco clip and another one was placed.    • ENDOSCOPY N/A 5/11/2020    Procedure: ESOPHAGOGASTRODUODENOSCOPY;  Surgeon: Felice Mcintosh MD;  Location:  [facility];  Service: Gastroenterology;  Laterality: N/A;  balloon dilation done using 18-20 balloon   • GASTROSTOMY FEEDING TUBE INSERTION  2018    [facility]   • HIP ARTHROSCOPY Right 2011    total hip replacement    • HYSTERECTOMY     • LUNG SURGERY  2000   • RETINAL DETACHMENT REPAIR  2015   • SPLENECTOMY  1980   • THYROIDECTOMY  2012    partial   • UPPER GASTROINTESTINAL ENDOSCOPY  2018 x 3    esophageal strictures by [facility]     FH:    Family History   Problem Relation Age of Onset   • Cancer Mother    • Lung cancer Mother    • Heart disease Father    • Heart disease Brother    • Breast cancer Maternal Grandmother    • Colon polyps Sister    • Colon cancer Neg Hx      SH:    Social History     Socioeconomic History   • Marital status:      Spouse name: Not on file   • Number of children: Not on file   • Years of education: Not on file   • Highest education level: Not on file   Tobacco Use   • Smoking status: Former Smoker     Packs/day: 3.00     Years: 40.00     Pack years: 120.00     Types: Cigarettes     Quit date: 5/10/2010     Years since quitting: 10.4   • Smokeless tobacco: Never Used   Substance and Sexual Activity   • Alcohol use: Yes     Comment: Socially    • Drug use: Yes     Types: Marijuana     Comment: brownie   • Sexual activity: Defer     ALLERGIES:    No Known  Allergies  MEDICATIONS:      Current Outpatient Medications:   •  diazePAM (VALIUM) 5 MG tablet, if needed., Disp: , Rfl:   •  fluticasone (FLONASE) 50 MCG/ACT nasal spray, 2 sprays into the nostril(s) as directed by provider 2 (Two) Times a Day., Disp: 1 bottle, Rfl: 11  •  levocetirizine (XYZAL) 5 MG tablet, Take 1 tablet by mouth Every Evening., Disp: 30 tablet, Rfl: 11  •  metoprolol tartrate (LOPRESSOR) 50 MG tablet, Take 1 tablet by mouth 2 (Two) Times a Day., Disp: , Rfl:   •  montelukast (SINGULAIR) 10 MG tablet, Take 1 tablet by mouth every night at bedtime., Disp: 90 tablet, Rfl: 10  •  omeprazole (priLOSEC) 20 MG capsule, Take 20 mg by mouth Daily., Disp: , Rfl:   •  tiotropium bromide monohydrate (Spiriva Respimat) 2.5 MCG/ACT aerosol solution inhaler, Inhale 2 puffs Daily., Disp: 1 inhaler, Rfl: 11  •  traZODone (DESYREL) 100 MG tablet, Take 100 mg by mouth As Needed., Disp: , Rfl:   •  vitamin C (ASCORBIC ACID) 500 MG tablet, Take 500 mg by mouth 2 (Two) Times a Day., Disp: , Rfl:   No current facility-administered medications for this visit.   ROS:  Per HPI, otherwise all systems reviewed and negative.    DIAGNOSTIC DATA (Reviewed and interpreted by me unless otherwise specified):    PFT 5/10/18 - severe obstruction, no change w/ BD, air trapping, normal DLCO    PFT 3/4/20 - very severe obstruction, + air trapping, normal DLCO    PFT 10/15/20 - severe obstruction, no restriction, +air trapping, mildly reduced DLCO    6MW 10/15/20 - 99% on RA start, min sat 99% on RA, walked 274 meters 62% predicted    CT Chest 2/21/18 - prior RLL lobectomy, radiation changes near mediastinum bilateral upper lobes, emphysema, multiple sub-centimeter nodules, achalasia w/ fluid filled esophagus, prior spleenectomy    CT Chest 2/6/19 - stable from prior, sub-centimeter nodules stable    CT Chest 2/6/20 - stable nodules, other findings stable        Vitals:    10/15/20 1227   BP: 124/74   Pulse: 92   Temp: 97.5 °F (36.4  °C)   SpO2: 100%       Physical Exam   Constitutional: Oriented to person, place, and time.   Head: Normocephalic and atraumatic.   Nose: Nose normal.   Mouth/Throat: normal.  Eyes: Conjunctivae are normal.  Pupils normal.  Neck: No tracheal deviation present.   Cardiovascular: Normal rate, regular rhythm, normal heart sounds and intact distal pulses.  Exam reveals no gallop and no friction rub.  No thrill.  No JVD.  No edema.  No murmur heard.  Pulmonary/Chest: Effort normal and breath sounds with prolonged expiratory phase.  No tenderness to palpation.  No clubbing.   Abdominal: Soft. Bowel sounds are normal. No distension. No tenderness. There is no guarding.   Musculoskeletal: Normal range of motion.  No tenderness.  Lymphadenopathy:  No cervical adenopathy.   Neurological:  No new focal neurological deficits observed   Skin: Skin is warm and dry. No rash noted.   Psychiatric: Normal mood and affect.  Behavior is normal. Judgment normal.    Assessment/Plan     1)  GOLD 4D COPD w/ Asthma Overlap - Continue pulmicort neb 0.5 mg bid, continue brovana miles bid, continue spiriva respimat - given samples.  Markers of allergic inflammation suggestive of significant Asthmatic component.  Doing much better on nebulized treatment compared to inhalers - she needs to continue this.  Low threshold for biologic if she worsens.  High risk for pneumonia w/ achalasia and fluid filled esophagus.    2)  Lung Nodules - sub-centimeter, stable x 2 years.  Given smoking history she still qualifies for lung cancer screening.  Due for next scan 3/2021.  Order for scan is in.    3)  Allergic Rhinitis - flonase, anti-histamine, singulair    4) Hypoxemia - 6MW w/ no desaturation on room air today with min sat 99%.  Will get overnight oximetry on RA - if normal will d/c oxygen.    RTC March 2021 w/ LDCT    Gage Collier MD  Pulmonology and Critical Care Medicine  10/15/20 13:39 EDT  Electronically Signed    C.C.:  Erik Pelletier  MD, Lucho Peters MD

## 2020-11-01 ENCOUNTER — APPOINTMENT (OUTPATIENT)
Dept: PREADMISSION TESTING | Facility: HOSPITAL | Age: 70
End: 2020-11-01

## 2021-03-05 DIAGNOSIS — J44.9 CHRONIC OBSTRUCTIVE PULMONARY DISEASE, UNSPECIFIED COPD TYPE (HCC): Primary | ICD-10-CM

## 2021-03-05 RX ORDER — MONTELUKAST SODIUM 10 MG/1
10 TABLET ORAL
Qty: 30 TABLET | Refills: 3 | Status: SHIPPED | OUTPATIENT
Start: 2021-03-05 | End: 2021-04-19 | Stop reason: SDUPTHER

## 2021-03-05 RX ORDER — LEVOCETIRIZINE DIHYDROCHLORIDE 5 MG/1
5 TABLET, FILM COATED ORAL EVERY EVENING
Qty: 30 TABLET | Refills: 11 | Status: SHIPPED | OUTPATIENT
Start: 2021-03-05 | End: 2021-04-19 | Stop reason: SDUPTHER

## 2021-03-19 ENCOUNTER — HOSPITAL ENCOUNTER (OUTPATIENT)
Dept: CT IMAGING | Facility: HOSPITAL | Age: 71
Discharge: HOME | End: 2021-03-19
Admitting: INTERNAL MEDICINE

## 2021-03-19 DIAGNOSIS — F17.211 CIGARETTE NICOTINE DEPENDENCE IN REMISSION: ICD-10-CM

## 2021-03-19 PROCEDURE — 71271 CT THORAX LUNG CANCER SCR C-: CPT

## 2021-04-19 ENCOUNTER — OFFICE VISIT (OUTPATIENT)
Dept: PULMONOLOGY | Facility: CLINIC | Age: 71
End: 2021-04-19

## 2021-04-19 VITALS
HEIGHT: 64 IN | OXYGEN SATURATION: 94 % | WEIGHT: 139 LBS | DIASTOLIC BLOOD PRESSURE: 80 MMHG | SYSTOLIC BLOOD PRESSURE: 124 MMHG | HEART RATE: 90 BPM | BODY MASS INDEX: 23.73 KG/M2 | TEMPERATURE: 98 F

## 2021-04-19 DIAGNOSIS — R06.09 DYSPNEA ON EXERTION: Primary | ICD-10-CM

## 2021-04-19 DIAGNOSIS — J44.9 CHRONIC OBSTRUCTIVE PULMONARY DISEASE, UNSPECIFIED COPD TYPE (HCC): ICD-10-CM

## 2021-04-19 DIAGNOSIS — J45.40 MODERATE PERSISTENT ASTHMA, UNSPECIFIED WHETHER COMPLICATED: ICD-10-CM

## 2021-04-19 LAB
ALBUMIN SERPL-MCNC: 3.6 G/DL (ref 3.5–5.2)
ALBUMIN/GLOB SERPL: 1.2 G/DL
ALP SERPL-CCNC: 87 U/L (ref 39–117)
ALT SERPL W P-5'-P-CCNC: 12 U/L (ref 1–33)
ANION GAP SERPL CALCULATED.3IONS-SCNC: 10.3 MMOL/L (ref 5–15)
AST SERPL-CCNC: 21 U/L (ref 1–32)
BASOPHILS # BLD AUTO: 0.08 10*3/MM3 (ref 0–0.2)
BASOPHILS NFR BLD AUTO: 0.7 % (ref 0–1.5)
BILIRUB SERPL-MCNC: 0.3 MG/DL (ref 0–1.2)
BUN SERPL-MCNC: 16 MG/DL (ref 8–23)
BUN/CREAT SERPL: 25.4 (ref 7–25)
CALCIUM SPEC-SCNC: 9.4 MG/DL (ref 8.6–10.5)
CHLORIDE SERPL-SCNC: 102 MMOL/L (ref 98–107)
CO2 SERPL-SCNC: 29.7 MMOL/L (ref 22–29)
CREAT SERPL-MCNC: 0.63 MG/DL (ref 0.57–1)
DEPRECATED RDW RBC AUTO: 46.5 FL (ref 37–54)
EOSINOPHIL # BLD AUTO: 0.28 10*3/MM3 (ref 0–0.4)
EOSINOPHIL NFR BLD AUTO: 2.5 % (ref 0.3–6.2)
ERYTHROCYTE [DISTWIDTH] IN BLOOD BY AUTOMATED COUNT: 14.2 % (ref 12.3–15.4)
GFR SERPL CREATININE-BSD FRML MDRD: 93 ML/MIN/1.73
GLOBULIN UR ELPH-MCNC: 2.9 GM/DL
GLUCOSE SERPL-MCNC: 91 MG/DL (ref 65–99)
HCT VFR BLD AUTO: 36.5 % (ref 34–46.6)
HGB BLD-MCNC: 11.8 G/DL (ref 12–15.9)
IMM GRANULOCYTES # BLD AUTO: 0.03 10*3/MM3 (ref 0–0.05)
IMM GRANULOCYTES NFR BLD AUTO: 0.3 % (ref 0–0.5)
LYMPHOCYTES # BLD AUTO: 3.41 10*3/MM3 (ref 0.7–3.1)
LYMPHOCYTES NFR BLD AUTO: 30.7 % (ref 19.6–45.3)
MCH RBC QN AUTO: 29.5 PG (ref 26.6–33)
MCHC RBC AUTO-ENTMCNC: 32.3 G/DL (ref 31.5–35.7)
MCV RBC AUTO: 91.3 FL (ref 79–97)
MONOCYTES # BLD AUTO: 1.08 10*3/MM3 (ref 0.1–0.9)
MONOCYTES NFR BLD AUTO: 9.7 % (ref 5–12)
NEUTROPHILS NFR BLD AUTO: 56.1 % (ref 42.7–76)
NEUTROPHILS NFR BLD AUTO: 6.21 10*3/MM3 (ref 1.7–7)
NRBC BLD AUTO-RTO: 0.1 /100 WBC (ref 0–0.2)
NT-PROBNP SERPL-MCNC: 757.7 PG/ML (ref 0–900)
PLATELET # BLD AUTO: 384 10*3/MM3 (ref 140–450)
PMV BLD AUTO: 11.5 FL (ref 6–12)
POTASSIUM SERPL-SCNC: 4.3 MMOL/L (ref 3.5–5.2)
PROT SERPL-MCNC: 6.5 G/DL (ref 6–8.5)
RBC # BLD AUTO: 4 10*6/MM3 (ref 3.77–5.28)
SODIUM SERPL-SCNC: 142 MMOL/L (ref 136–145)
WBC # BLD AUTO: 11.09 10*3/MM3 (ref 3.4–10.8)

## 2021-04-19 PROCEDURE — 80053 COMPREHEN METABOLIC PANEL: CPT | Performed by: INTERNAL MEDICINE

## 2021-04-19 PROCEDURE — 36415 COLL VENOUS BLD VENIPUNCTURE: CPT | Performed by: INTERNAL MEDICINE

## 2021-04-19 PROCEDURE — 83880 ASSAY OF NATRIURETIC PEPTIDE: CPT | Performed by: INTERNAL MEDICINE

## 2021-04-19 PROCEDURE — 85025 COMPLETE CBC W/AUTO DIFF WBC: CPT | Performed by: INTERNAL MEDICINE

## 2021-04-19 PROCEDURE — 82785 ASSAY OF IGE: CPT | Performed by: INTERNAL MEDICINE

## 2021-04-19 PROCEDURE — 99214 OFFICE O/P EST MOD 30 MIN: CPT | Performed by: INTERNAL MEDICINE

## 2021-04-19 RX ORDER — ALBUTEROL SULFATE 90 UG/1
2 AEROSOL, METERED RESPIRATORY (INHALATION) EVERY 4 HOURS PRN
Qty: 6.7 G | Refills: 11 | Status: SHIPPED | OUTPATIENT
Start: 2021-04-19 | End: 2021-05-27 | Stop reason: SDUPTHER

## 2021-04-19 RX ORDER — FLUTICASONE PROPIONATE 50 MCG
2 SPRAY, SUSPENSION (ML) NASAL 2 TIMES DAILY
Qty: 18.2 ML | Refills: 11 | Status: SHIPPED | OUTPATIENT
Start: 2021-04-19

## 2021-04-19 RX ORDER — MONTELUKAST SODIUM 10 MG/1
10 TABLET ORAL
Qty: 30 TABLET | Refills: 3 | Status: SHIPPED | OUTPATIENT
Start: 2021-04-19 | End: 2021-07-08

## 2021-04-19 RX ORDER — LEVOCETIRIZINE DIHYDROCHLORIDE 5 MG/1
5 TABLET, FILM COATED ORAL EVERY EVENING
Qty: 30 TABLET | Refills: 11 | Status: SHIPPED | OUTPATIENT
Start: 2021-04-19 | End: 2021-11-29

## 2021-04-19 NOTE — PROGRESS NOTES
CC:    Lung nodules / COPD    HPI:    68 y/o WF w/ complex PMH:  120 py smoking quit 2010, COPD, NSCLC s/p resection RLL 2000 (presumeably early stage - no chemo/RT), Hodgkins Lymphoma 1980 s/p chest RT and spleenectomy, Ovarian Ca 1975 s/p RT, hypothyroidism, achalasia referred by Dr. Pelletier for lung nodules.  She recently had an esophageal dilation followed by CT chest in Feb that showed achalasia.  In addition the CT scan showed several sub-centimeter scattered nodules.  Patient quit smoking as mentioned above in 2010.  She does report significant ZHONG with minimal activity (showering, dressing, ADL's, walking up steps).  Does ok walking on a flat surface.  No other complaints.    Repeat CT showed a chest wall based cavitary RML lesion.  Radiology recommended CT guided biopsy.  She went for this and the lesion decreased in size so the biopsy was cancelled.   She had a PEG placed for her severe esophageal stenosis and continues to get repeat dilations.    PEG tube has been removed.  Patient has had persistent fistula that was closed endoscopically.  She has gained weight, is tolerating po, and now has a healthier BMI of 24.    INTERVAL HISTORY:    Patient returns for follow up.  Having severe ZHONG.  Not  Much in the way overt chest tightness / wheezing / typical triggers.  No chest pain.  ET is severely limited to about 30 feet.    PMH:    Past Medical History:   Diagnosis Date   • Breast cancer (CMS/HCC)    • Cancer (CMS/HCC)     lung   • Colon polyp    • COPD (chronic obstructive pulmonary disease) (CMS/HCC)    • Disease of thyroid gland    • Dysrhythmia     tachycardia   • Frequent PVCs     bigeminal pvc   • GERD (gastroesophageal reflux disease)    • History of pneumonia    • Hodgkin's disease (CMS/HCC)    • Hypertension    • Lung cancer (CMS/HCC)    • Ovarian cancer (CMS/HCC)      PSH:    Past Surgical History:   Procedure Laterality Date   • BREAST LUMPECTOMY     • CATARACT EXTRACTION     • CATARACT EXTRACTION  W/ INTRAOCULAR LENS IMPLANTW/ TRABECULECTOMY     • COLONOSCOPY      limited due to scar tissue more thatn 10 years ago   • ENDOSCOPY     • ENDOSCOPY N/A 3/19/2020    Procedure: ESOPHAGOGASTRODUODENOSCOPY WITH ARGON PLASMA COAGULATOR;  Surgeon: Felice Mcintosh MD;  Location:  [facility];  Service: Gastroenterology;  Laterality: N/A;   • ENDOSCOPY N/A 4/7/2020    Procedure: ESOPHAGOGASTRODUODENOSCOPY;  Surgeon: Felice Mcintosh MD;  Location:  [facility];  Service: Gastroenterology;  Laterality: N/A;  APC was used to remove ovesco clip and another one was placed.    • ENDOSCOPY N/A 5/11/2020    Procedure: ESOPHAGOGASTRODUODENOSCOPY;  Surgeon: Felice Mcintosh MD;  Location:  [facility];  Service: Gastroenterology;  Laterality: N/A;  balloon dilation done using 18-20 balloon   • GASTROSTOMY FEEDING TUBE INSERTION  2018    [facility]   • HIP ARTHROSCOPY Right 2011    total hip replacement    • HYSTERECTOMY     • LUNG SURGERY  2000   • RETINAL DETACHMENT REPAIR  2015   • SPLENECTOMY  1980   • THYROIDECTOMY  2012    partial   • UPPER GASTROINTESTINAL ENDOSCOPY  2018 x 3    esophageal strictures by [facility]     FH:    Family History   Problem Relation Age of Onset   • Cancer Mother    • Lung cancer Mother    • Heart disease Father    • Heart disease Brother    • Breast cancer Maternal Grandmother    • Colon polyps Sister    • Colon cancer Neg Hx      SH:    Social History     Socioeconomic History   • Marital status:      Spouse name: Not on file   • Number of children: Not on file   • Years of education: Not on file   • Highest education level: Not on file   Tobacco Use   • Smoking status: Former Smoker     Packs/day: 3.00     Years: 40.00     Pack years: 120.00     Types: Cigarettes     Quit date: 5/10/2010     Years since quitting: 10.9   • Smokeless tobacco: Never Used   Substance and Sexual Activity   • Alcohol use: Yes     Comment: Socially     • Drug use: Yes     Types: Marijuana     Comment: brownie   • Sexual activity: Defer     ALLERGIES:    No Known Allergies  MEDICATIONS:      Current Outpatient Medications:   •  albuterol sulfate  (90 Base) MCG/ACT inhaler, Inhale 2 puffs Every 4 (Four) Hours As Needed for Wheezing or Shortness of Air. Use with spacer, Disp: 6.7 g, Rfl: 11  •  diazePAM (VALIUM) 5 MG tablet, if needed., Disp: , Rfl:   •  fluticasone (Flonase) 50 MCG/ACT nasal spray, 2 sprays into the nostril(s) as directed by provider 2 (Two) Times a Day., Disp: 1 bottle, Rfl: 11  •  levocetirizine (Xyzal) 5 MG tablet, Take 1 tablet by mouth Every Evening., Disp: 30 tablet, Rfl: 11  •  metoprolol tartrate (LOPRESSOR) 50 MG tablet, Take 1 tablet by mouth 2 (Two) Times a Day., Disp: , Rfl:   •  montelukast (SINGULAIR) 10 MG tablet, Take 1 tablet by mouth every night at bedtime., Disp: 30 tablet, Rfl: 3  •  omeprazole (priLOSEC) 20 MG capsule, Take 20 mg by mouth Daily., Disp: , Rfl:   •  Spacer/Aero-Holding Chambers (E-Z Spacer) inhaler, Use as instructed, Disp: 1 each, Rfl: 2  •  tiotropium bromide monohydrate (Spiriva Respimat) 2.5 MCG/ACT aerosol solution inhaler, Inhale 2 puffs Daily., Disp: 1 inhaler, Rfl: 11  •  traZODone (DESYREL) 100 MG tablet, Take 100 mg by mouth As Needed., Disp: , Rfl:   •  vitamin C (ASCORBIC ACID) 500 MG tablet, Take 500 mg by mouth 2 (Two) Times a Day., Disp: , Rfl:   ROS:  Per HPI, otherwise all systems reviewed and negative.    DIAGNOSTIC DATA (Reviewed and interpreted by me unless otherwise specified):    PFT 5/10/18 - severe obstruction, no change w/ BD, air trapping, normal DLCO    PFT 3/4/20 - very severe obstruction, + air trapping, normal DLCO    PFT 10/15/20 - severe obstruction, no restriction, +air trapping, mildly reduced DLCO    6MW 10/15/20 - 99% on RA start, min sat 99% on RA, walked 274 meters 62% predicted    CT Chest 2/21/18 - prior RLL lobectomy, radiation changes near mediastinum bilateral  upper lobes, emphysema, multiple sub-centimeter nodules, achalasia w/ fluid filled esophagus, prior spleenectomy    CT Chest 2/6/19 - stable from prior, sub-centimeter nodules stable    CT Chest 2/6/20 - stable nodules, other findings stable    CT Chest 3/19/21 - Lung RADS 2    Vitals:    04/19/21 1304   BP: 124/80   Pulse: 90   Temp: 98 °F (36.7 °C)   SpO2: 94%       Physical Exam   Constitutional: Oriented to person, place, and time.   Head: Normocephalic and atraumatic.   Nose: Nose normal.   Mouth/Throat: normal.  Eyes: Conjunctivae are normal.  Pupils normal.  Neck: No tracheal deviation present.   Cardiovascular: Normal rate, regular rhythm, normal heart sounds and intact distal pulses.  Exam reveals no gallop and no friction rub.  No thrill.  No JVD.  No edema.  No murmur heard.  Pulmonary/Chest: Effort normal and breath sounds with prolonged expiratory phase.  No tenderness to palpation.  No clubbing.   Abdominal: Soft. Bowel sounds are normal. No distension. No tenderness. There is no guarding.   Musculoskeletal: Normal range of motion.  No tenderness.  Lymphadenopathy:  No cervical adenopathy.   Neurological:  No new focal neurological deficits observed   Skin: Skin is warm and dry. No rash noted.   Psychiatric: Normal mood and affect.  Behavior is normal. Judgment normal.    Assessment/Plan     1)  GOLD 4D COPD w/ Asthma Overlap - Continue pulmicort neb 0.5 mg bid, continue brovana miles bid, continue spiriva respimat - given samples.  Markers of allergic inflammation suggestive of significant Asthmatic component.  High risk for pneumonia w/ achalasia and fluid filled esophagus.    2)  Lung Nodules - sub-centimeter, stable x 2 years.  Given smoking history she still qualifies for lung cancer screening.  Due for next scan 3/2022.  Order for scan is in.    3)  Allergic Rhinitis - flonase, anti-histamine, singulair    4) Nocturnal Hypoxemia - continue 2 lpm hs    5) Dyspnea - this has significantly worsened.   I am concerned there could be something else going on.  Had moderate AI on last echo and diastolic dysfunction.  Will get formal echo and stress echo.  Certainly has risk factors with prior smoking and chest radiation.  Re-check lab markers of allergic inflammation.  CBC w/ diff, CMP, BNP, and IgE.  RTC 1 month with repeat full PFT.  Treatment will depend on results of above testing.      RTC 1 month w/ Echo, Stress Echo, and Full PFT    Gage Collier MD  Pulmonology and Critical Care Medicine  04/19/21 13:37 EDT  Electronically Signed    C.C.:  No ref. provider found, Lucho Peters MD

## 2021-04-24 LAB — IGE SERPL-ACNC: 121 IU/ML (ref 6–495)

## 2021-05-21 ENCOUNTER — APPOINTMENT (OUTPATIENT)
Dept: PREADMISSION TESTING | Facility: HOSPITAL | Age: 71
End: 2021-05-21

## 2021-05-24 ENCOUNTER — HOSPITAL ENCOUNTER (OUTPATIENT)
Dept: CARDIOLOGY | Facility: HOSPITAL | Age: 71
Discharge: HOME | End: 2021-05-24

## 2021-05-24 ENCOUNTER — CLINICAL SUPPORT NO REQUIREMENTS (OUTPATIENT)
Dept: PULMONOLOGY | Facility: CLINIC | Age: 71
End: 2021-05-24

## 2021-05-24 ENCOUNTER — APPOINTMENT (OUTPATIENT)
Dept: CARDIOLOGY | Facility: HOSPITAL | Age: 71
End: 2021-05-24

## 2021-05-24 VITALS
BODY MASS INDEX: 23.73 KG/M2 | DIASTOLIC BLOOD PRESSURE: 100 MMHG | SYSTOLIC BLOOD PRESSURE: 178 MMHG | HEIGHT: 64 IN | WEIGHT: 139 LBS

## 2021-05-24 DIAGNOSIS — R06.09 DYSPNEA ON EXERTION: ICD-10-CM

## 2021-05-24 DIAGNOSIS — Z01.812 BLOOD TESTS PRIOR TO TREATMENT OR PROCEDURE: Primary | ICD-10-CM

## 2021-05-24 PROCEDURE — C9803 HOPD COVID-19 SPEC COLLECT: HCPCS

## 2021-05-24 PROCEDURE — U0005 INFEC AGEN DETEC AMPLI PROBE: HCPCS | Performed by: INTERNAL MEDICINE

## 2021-05-24 PROCEDURE — 99211 OFF/OP EST MAY X REQ PHY/QHP: CPT | Performed by: INTERNAL MEDICINE

## 2021-05-24 PROCEDURE — 93306 TTE W/DOPPLER COMPLETE: CPT

## 2021-05-24 PROCEDURE — 93306 TTE W/DOPPLER COMPLETE: CPT | Performed by: INTERNAL MEDICINE

## 2021-05-24 PROCEDURE — U0004 COV-19 TEST NON-CDC HGH THRU: HCPCS | Performed by: INTERNAL MEDICINE

## 2021-05-25 LAB
BH CV ECHO MEAS - AI P1/2T: 196 MSEC
BH CV ECHO MEAS - AO MAX PG: 15 MMHG
BH CV ECHO MEAS - AO MEAN PG: 8 MMHG
BH CV ECHO MEAS - AO ROOT DIAM: 2.8 CM
BH CV ECHO MEAS - AO V2 MAX: 191.6 CM/SEC
BH CV ECHO MEAS - AVA(I,D): 1.8 CM2
BH CV ECHO MEAS - EDV(MOD-SP2): 107 ML
BH CV ECHO MEAS - EDV(MOD-SP4): 103 ML
BH CV ECHO MEAS - EF(MOD-BP): 56 %
BH CV ECHO MEAS - EF(MOD-SP2): 56 %
BH CV ECHO MEAS - EF(MOD-SP4): 65 %
BH CV ECHO MEAS - ESV(MOD-SP2): 47 ML
BH CV ECHO MEAS - ESV(MOD-SP4): 36 ML
BH CV ECHO MEAS - FS: 29 %
BH CV ECHO MEAS - IVS/LVPW: 0.7 CM
BH CV ECHO MEAS - IVSD: 0.9 CM
BH CV ECHO MEAS - LA DIMENSION: 2.7 CM
BH CV ECHO MEAS - LAT PEAK E' VEL: 17.1 CM/SEC
BH CV ECHO MEAS - LV MAX PG: 2.9 MMHG
BH CV ECHO MEAS - LV MEAN PG: 1.7 MMHG
BH CV ECHO MEAS - LVIDD: 3.1 CM
BH CV ECHO MEAS - LVIDS: 2.2 CM
BH CV ECHO MEAS - LVOT AREA: 3.2 CM2
BH CV ECHO MEAS - LVOT DIAM: 2 CM
BH CV ECHO MEAS - LVPWD: 1.2 CM
BH CV ECHO MEAS - MED PEAK E' VEL: 14.9 CM/SEC
BH CV ECHO MEAS - MV DEC TIME: 0 MSEC
BH CV ECHO MEAS - MV E MAX VEL: 153.6 CM/SEC
BH CV ECHO MEAS - RAP SYSTOLE: 3 MMHG
BH CV ECHO MEAS - RVSP: 42 MMHG
BH CV ECHO MEAS - TAPSE (>1.6): 1.9 CM
BH CV ECHO MEAS - TR MAX PG: 39 MMHG
BH CV ECHO MEAS - TR MAX VEL: 312 CM/SEC
BH CV ECHO MEASUREMENTS AVERAGE E/E' RATIO: 9.6
BH CV XLRA - RV BASE: 3 CM
BH CV XLRA - RV LENGTH: 6.8 CM
BH CV XLRA - RV MID: 1.8 CM
BH CV XLRA - TDI S': 12.9 CM/SEC
LEFT ATRIUM VOLUME INDEX: 28 ML/M2
LV EF 2D ECHO EST: 60 %
MAXIMAL PREDICTED HEART RATE: 149 BPM
SARS-COV-2 RNA NOSE QL NAA+PROBE: NOT DETECTED
STRESS TARGET HR: 127 BPM

## 2021-05-26 DIAGNOSIS — J44.9 CHRONIC OBSTRUCTIVE PULMONARY DISEASE, UNSPECIFIED COPD TYPE (HCC): Primary | ICD-10-CM

## 2021-05-27 ENCOUNTER — OFFICE VISIT (OUTPATIENT)
Dept: PULMONOLOGY | Facility: CLINIC | Age: 71
End: 2021-05-27

## 2021-05-27 VITALS
OXYGEN SATURATION: 93 % | DIASTOLIC BLOOD PRESSURE: 70 MMHG | HEART RATE: 93 BPM | SYSTOLIC BLOOD PRESSURE: 112 MMHG | BODY MASS INDEX: 23.16 KG/M2 | TEMPERATURE: 99.1 F | WEIGHT: 135 LBS

## 2021-05-27 DIAGNOSIS — J44.9 CHRONIC OBSTRUCTIVE PULMONARY DISEASE, UNSPECIFIED COPD TYPE (HCC): Primary | ICD-10-CM

## 2021-05-27 PROCEDURE — 94729 DIFFUSING CAPACITY: CPT | Performed by: INTERNAL MEDICINE

## 2021-05-27 PROCEDURE — 94060 EVALUATION OF WHEEZING: CPT | Performed by: INTERNAL MEDICINE

## 2021-05-27 PROCEDURE — 94726 PLETHYSMOGRAPHY LUNG VOLUMES: CPT | Performed by: INTERNAL MEDICINE

## 2021-05-27 PROCEDURE — 99214 OFFICE O/P EST MOD 30 MIN: CPT | Performed by: INTERNAL MEDICINE

## 2021-05-27 RX ORDER — ARFORMOTEROL TARTRATE 15 UG/2ML
15 SOLUTION RESPIRATORY (INHALATION)
COMMUNITY
Start: 2021-05-10

## 2021-05-27 RX ORDER — PREDNISONE 10 MG/1
10 TABLET ORAL DAILY
Qty: 60 TABLET | Refills: 0 | Status: SHIPPED | OUTPATIENT
Start: 2021-05-27 | End: 2021-06-24 | Stop reason: SDUPTHER

## 2021-05-27 RX ORDER — ALBUTEROL SULFATE 90 UG/1
2 AEROSOL, METERED RESPIRATORY (INHALATION) EVERY 4 HOURS PRN
Qty: 54 G | Refills: 2 | Status: SHIPPED | OUTPATIENT
Start: 2021-05-27 | End: 2021-06-03 | Stop reason: SDUPTHER

## 2021-05-27 RX ORDER — ASPIRIN 81 MG/1
81 TABLET, CHEWABLE ORAL DAILY
COMMUNITY
End: 2022-08-17

## 2021-05-27 RX ORDER — BUDESONIDE 0.5 MG/2ML
0.5 INHALANT ORAL
COMMUNITY
Start: 2021-05-10

## 2021-05-27 RX ORDER — LEVALBUTEROL TARTRATE 45 UG/1
3 AEROSOL, METERED ORAL ONCE
Status: COMPLETED | OUTPATIENT
Start: 2021-05-27 | End: 2021-05-27

## 2021-05-27 RX ORDER — ALBUTEROL SULFATE 90 UG/1
2 AEROSOL, METERED RESPIRATORY (INHALATION) EVERY 4 HOURS PRN
Qty: 6.7 G | Refills: 11 | OUTPATIENT
Start: 2021-05-27

## 2021-05-27 RX ADMIN — LEVALBUTEROL TARTRATE 3 PUFF: 45 AEROSOL, METERED ORAL at 15:20

## 2021-05-28 ENCOUNTER — OFFICE VISIT (OUTPATIENT)
Dept: CARDIOLOGY | Facility: CLINIC | Age: 71
End: 2021-05-28

## 2021-05-28 VITALS
SYSTOLIC BLOOD PRESSURE: 166 MMHG | BODY MASS INDEX: 22.84 KG/M2 | WEIGHT: 133.8 LBS | DIASTOLIC BLOOD PRESSURE: 72 MMHG | HEIGHT: 64 IN | HEART RATE: 87 BPM | OXYGEN SATURATION: 98 %

## 2021-05-28 DIAGNOSIS — R06.00 DYSPNEA, UNSPECIFIED TYPE: Primary | ICD-10-CM

## 2021-05-28 DIAGNOSIS — I27.20 PULMONARY HYPERTENSION (HCC): ICD-10-CM

## 2021-05-28 DIAGNOSIS — I48.0 PAROXYSMAL ATRIAL FIBRILLATION (HCC): Primary | ICD-10-CM

## 2021-05-28 PROCEDURE — 99214 OFFICE O/P EST MOD 30 MIN: CPT | Performed by: INTERNAL MEDICINE

## 2021-05-28 PROCEDURE — 93000 ELECTROCARDIOGRAM COMPLETE: CPT | Performed by: INTERNAL MEDICINE

## 2021-05-28 NOTE — PROGRESS NOTES
CC:    Lung nodules / COPD    HPI:    66 y/o WF w/ complex PMH:  120 py smoking quit 2010, COPD, NSCLC s/p resection RLL 2000 (presumeably early stage - no chemo/RT), Hodgkins Lymphoma 1980 s/p chest RT and spleenectomy, Ovarian Ca 1975 s/p RT, hypothyroidism, achalasia referred by Dr. Pelletier for lung nodules.  She recently had an esophageal dilation followed by CT chest in Feb that showed achalasia.  In addition the CT scan showed several sub-centimeter scattered nodules.  Patient quit smoking as mentioned above in 2010.  She does report significant ZHONG with minimal activity (showering, dressing, ADL's, walking up steps).  Does ok walking on a flat surface.  No other complaints.    Repeat CT showed a chest wall based cavitary RML lesion.  Radiology recommended CT guided biopsy.  She went for this and the lesion decreased in size so the biopsy was cancelled.   She had a PEG placed for her severe esophageal stenosis and continues to get repeat dilations.    PEG tube has been removed.  Patient has had persistent fistula that was closed endoscopically.  She has gained weight, is tolerating po, and now has a healthier BMI of 24.    INTERVAL HISTORY:    Patient returns for follow up.  Still with very severe ZHONG.  Not feeling any better with nebs.  Had Echo w/ mod AI and elevated RVSP.      PMH:    Past Medical History:   Diagnosis Date   • Breast cancer (CMS/HCC)    • Cancer (CMS/HCC)     lung   • Colon polyp    • COPD (chronic obstructive pulmonary disease) (CMS/HCC)    • Disease of thyroid gland    • Dysrhythmia     tachycardia   • Frequent PVCs     bigeminal pvc   • GERD (gastroesophageal reflux disease)    • History of pneumonia    • Hodgkin's disease (CMS/HCC)    • Hypertension    • Lung cancer (CMS/HCC)    • Ovarian cancer (CMS/HCC)      PSH:    Past Surgical History:   Procedure Laterality Date   • BREAST LUMPECTOMY     • CATARACT EXTRACTION     • CATARACT EXTRACTION W/ INTRAOCULAR LENS IMPLANTW/ TRABECULECTOMY      • COLONOSCOPY      limited due to scar tissue more thatn 10 years ago   • ENDOSCOPY     • ENDOSCOPY N/A 3/19/2020    Procedure: ESOPHAGOGASTRODUODENOSCOPY WITH ARGON PLASMA COAGULATOR;  Surgeon: Felice Mcintosh MD;  Location:  [facility];  Service: Gastroenterology;  Laterality: N/A;   • ENDOSCOPY N/A 2020    Procedure: ESOPHAGOGASTRODUODENOSCOPY;  Surgeon: Felice Mcintosh MD;  Location:  [facility];  Service: Gastroenterology;  Laterality: N/A;  APC was used to remove ovesco clip and another one was placed.    • ENDOSCOPY N/A 2020    Procedure: ESOPHAGOGASTRODUODENOSCOPY;  Surgeon: Felice Mcintosh MD;  Location:  [facility];  Service: Gastroenterology;  Laterality: N/A;  balloon dilation done using 18-20 balloon   • GASTROSTOMY FEEDING TUBE INSERTION      [facility]   • HIP ARTHROSCOPY Right     total hip replacement    • HYSTERECTOMY     • LUNG SURGERY     • RETINAL DETACHMENT REPAIR     • SPLENECTOMY     • THYROIDECTOMY  2012    partial   • UPPER GASTROINTESTINAL ENDOSCOPY  2018 x 3    esophageal strictures by [facility]     FH:    Family History   Problem Relation Age of Onset   • Cancer Mother    • Lung cancer Mother    • Heart disease Father    • Heart disease Brother    • Breast cancer Maternal Grandmother    • Colon polyps Sister    • Colon cancer Neg Hx      SH:    Social History     Socioeconomic History   • Marital status:      Spouse name: Not on file   • Number of children: Not on file   • Years of education: Not on file   • Highest education level: Not on file   Tobacco Use   • Smoking status: Former Smoker     Packs/day: 3.00     Years: 40.00     Pack years: 120.00     Types: Cigarettes     Quit date: 5/10/2010     Years since quittin.0   • Smokeless tobacco: Never Used   Substance and Sexual Activity   • Alcohol use: Yes     Comment: Socially    • Drug use: Yes     Types: Marijuana      Comment: brownie occas   • Sexual activity: Defer     ALLERGIES:    No Known Allergies  MEDICATIONS:      Current Outpatient Medications:   •  albuterol sulfate  (90 Base) MCG/ACT inhaler, Inhale 2 puffs Every 4 (Four) Hours As Needed for Wheezing or Shortness of Air. Use with spacer, Disp: 54 g, Rfl: 2  •  aspirin 81 MG chewable tablet, Chew 81 mg Daily., Disp: , Rfl:   •  Brovana 15 MCG/2ML nebulizer solution, 15 mcg 2 (Two) Times a Day., Disp: , Rfl:   •  budesonide (PULMICORT) 0.5 MG/2ML nebulizer solution, Take 0.5 mg by nebulization Daily., Disp: , Rfl:   •  diazePAM (VALIUM) 5 MG tablet, if needed., Disp: , Rfl:   •  fluticasone (Flonase) 50 MCG/ACT nasal spray, 2 sprays by Each Nare route 2 (Two) Times a Day., Disp: 18.2 mL, Rfl: 11  •  levocetirizine (Xyzal) 5 MG tablet, Take 1 tablet by mouth Every Evening., Disp: 30 tablet, Rfl: 11  •  metoprolol tartrate (LOPRESSOR) 50 MG tablet, Take 1 tablet by mouth 2 (Two) Times a Day., Disp: , Rfl:   •  montelukast (SINGULAIR) 10 MG tablet, Take 1 tablet by mouth every night at bedtime., Disp: 30 tablet, Rfl: 3  •  omeprazole (priLOSEC) 20 MG capsule, Take 20 mg by mouth Daily., Disp: , Rfl:   •  Spacer/Aero-Holding Chambers (E-Z Spacer) inhaler, Use as instructed, Disp: 1 each, Rfl: 2  •  tiotropium bromide monohydrate (Spiriva Respimat) 2.5 MCG/ACT aerosol solution inhaler, Inhale 2 puffs Daily., Disp: 1 inhaler, Rfl: 11  •  traZODone (DESYREL) 100 MG tablet, Take 100 mg by mouth As Needed., Disp: , Rfl:   •  vitamin C (ASCORBIC ACID) 500 MG tablet, Take 500 mg by mouth 2 (Two) Times a Day., Disp: , Rfl:   •  predniSONE (DELTASONE) 10 MG tablet, Take 1 tablet by mouth Daily., Disp: 60 tablet, Rfl: 0  No current facility-administered medications for this visit.  ROS:  Per HPI, otherwise all systems reviewed and negative.    DIAGNOSTIC DATA (Reviewed and interpreted by me unless otherwise specified):    PFT 5/10/18 - severe obstruction, no change w/ BD, air  trapping, normal DLCO    PFT 3/4/20 - very severe obstruction, + air trapping, normal DLCO    PFT 10/15/20 - severe obstruction, no restriction, +air trapping, mildly reduced DLCO    PFT 5/27/21 - severe obstruction, no change w/ BD w/ ATS criteria, no restriction, +air trapping, mild reduction in DLCO corrects for alveolar volume    6MW 10/15/20 - 99% on RA start, min sat 99% on RA, walked 274 meters 62% predicted    CT Chest 2/21/18 - prior RLL lobectomy, radiation changes near mediastinum bilateral upper lobes, emphysema, multiple sub-centimeter nodules, achalasia w/ fluid filled esophagus, prior spleenectomy    CT Chest 2/6/19 - stable from prior, sub-centimeter nodules stable    CT Chest 2/6/20 - stable nodules, other findings stable    CT Chest 3/19/21 - Lung RADS 2    Vitals:    05/27/21 1533   BP: 112/70   Pulse: 93   Temp: 99.1 °F (37.3 °C)   SpO2: 93%       Physical Exam   Constitutional: Oriented to person, place, and time.   Head: Normocephalic and atraumatic.   Nose: Nose normal.   Mouth/Throat: normal.  Eyes: Conjunctivae are normal.  Pupils normal.  Neck: No tracheal deviation present.   Cardiovascular: Normal rate, regular rhythm, normal heart sounds and intact distal pulses.  Exam reveals no gallop and no friction rub.  No thrill.  No JVD.  No edema.  No murmur heard.  Pulmonary/Chest: Effort normal and breath sounds with prolonged expiratory phase.  No tenderness to palpation.  No clubbing.   Abdominal: Soft. Bowel sounds are normal. No distension. No tenderness. There is no guarding.   Musculoskeletal: Normal range of motion.  No tenderness.  Lymphadenopathy:  No cervical adenopathy.   Neurological:  No new focal neurological deficits observed   Skin: Skin is warm and dry. No rash noted.   Psychiatric: Normal mood and affect.  Behavior is normal. Judgment normal.    Assessment/Plan     1)  GOLD 4D COPD w/ Asthma Overlap - Continue pulmicort neb 0.5 mg bid, continue brovana miles bid.  Markers of  allergic inflammation suggestive of significant Asthmatic component.  High risk for pneumonia w/ achalasia and fluid filled esophagus.  Change Spiriva to Yupelri neb once daily.  Patients QOL and breathing are severely impaired.  We discussed risks and benefits of trying low dose prednisone, she would like to give it a try.  Will start 10 mg prednisone daily.  Has Cardiology apt tomorrow.  Would like to see her back in 4-6 weeks, if improved we could consider starting a biologic for steroid dependent asthma.  Refer to Pulmonary Rehab closer to home.    2)  Lung Nodules - sub-centimeter, stable x 2 years.  Given smoking history she still qualifies for lung cancer screening.  Due for next scan 3/2022.  Order for scan is in.    3)  Allergic Rhinitis - flonase, anti-histamine, singulair    4) Nocturnal Hypoxemia - continue 2 lpm hs    RTC 4-6 weeks    Gage Collier MD  Pulmonology and Critical Care Medicine  05/28/21 14:58 EDT  Electronically Signed    C.C.:  No ref. provider found, Lucho Peters MD

## 2021-05-28 NOTE — PROGRESS NOTES
[facility]  Consultation H&P  Ita Pickett  1950  [address]  [place] [address]     VISIT DATE:  05/28/21    PCP: Lucho Peters MD  [address] [place] [address]    IDENTIFICATION: A 70 y.o. female     PROBLEM LIST:  1.  Paroxysmal Arrhythmias (SVT, AFib)              A.  Presents to [facility] in [facility], occasional PVC.  SVT noted.  Converted to AFiv with IV Diltiazem.              B.  Echo 3/18/2020 Dr. Martinez:  EF 65%, mod AR/mild MR/ Mod-sev TR with RVSP 45-55mmHg.  Moderate circumferential pericardial effusion with a maximal diameter of 2.3 cm that is predominantly located adjacent to the right ventricle. There was no evidence of cardiac   tamponade. The right ventricle did not demonstrate diastolic collapse.               C.  CHADS2 Vasc = 3 (HTN, Age, Female).  2.  Dyspnea  3.  COPD (chronic obstructive pulmonary disease) (CMS/[facility])              A.  Former Smoking Tobacco abuse, quit 2010 5/21 echo EF 55% mod AI pro bnp [phone])  4.  Essential Hypertension  5.  H/O Squamous Cell lung cancer              A.  S/P RLL resection, 2000  6.  H/O Breast cancer (CMS/HCC), s/p lumpectomy  7.  H/O Hodgkin lymphoma (CMS/HCC), 1980              A.  Chest RT              B.  S/P spenectomy  8.  H/O Ovarian Cancer, 1975              A.  S/P PINKY               B.  S/P RT   9.  Esophageal Stricture              A.  [facility]              B.  Type II Achalasia              C.  Last esophageal dilation, 3/11/2020              D.  Previous PEG s/p removal  10.  Sepsis, unspecified organism (CMS/HCC)  11.  H/O Partial thyroidectomy        CC:  Chief Complaint   Patient presents with   • Atrial Fibrillation   • Shortness of Breath   • Hypertension       Allergies  No Known Allergies    Current Medications    Current Outpatient Medications:   •  albuterol sulfate  (90 Base) MCG/ACT inhaler, Inhale 2 puffs Every 4 (Four) Hours As Needed  for Wheezing or Shortness of Air. Use with spacer, Disp: 54 g, Rfl: 2  •  aspirin 81 MG chewable tablet, Chew 81 mg Daily., Disp: , Rfl:   •  Brovana 15 MCG/2ML nebulizer solution, 15 mcg 2 (Two) Times a Day., Disp: , Rfl:   •  budesonide (PULMICORT) 0.5 MG/2ML nebulizer solution, Take 0.5 mg by nebulization Daily., Disp: , Rfl:   •  diazePAM (VALIUM) 5 MG tablet, if needed., Disp: , Rfl:   •  fluticasone (Flonase) 50 MCG/ACT nasal spray, 2 sprays by Each Nare route 2 (Two) Times a Day., Disp: 18.2 mL, Rfl: 11  •  levocetirizine (Xyzal) 5 MG tablet, Take 1 tablet by mouth Every Evening., Disp: 30 tablet, Rfl: 11  •  metoprolol tartrate (LOPRESSOR) 50 MG tablet, Take 1 tablet by mouth 2 (Two) Times a Day., Disp: , Rfl:   •  montelukast (SINGULAIR) 10 MG tablet, Take 1 tablet by mouth every night at bedtime., Disp: 30 tablet, Rfl: 3  •  omeprazole (priLOSEC) 20 MG capsule, Take 20 mg by mouth Daily., Disp: , Rfl:   •  predniSONE (DELTASONE) 10 MG tablet, Take 1 tablet by mouth Daily., Disp: 60 tablet, Rfl: 0  •  Spacer/Aero-Holding Chambers (E-Z Spacer) inhaler, Use as instructed, Disp: 1 each, Rfl: 2  •  tiotropium bromide monohydrate (Spiriva Respimat) 2.5 MCG/ACT aerosol solution inhaler, Inhale 2 puffs Daily., Disp: 1 inhaler, Rfl: 11  •  traZODone (DESYREL) 100 MG tablet, Take 100 mg by mouth As Needed., Disp: , Rfl:   •  vitamin C (ASCORBIC ACID) 500 MG tablet, Take 500 mg by mouth 2 (Two) Times a Day., Disp: , Rfl:   No current facility-administered medications for this visit.     History of Present Illness   HPI  Ita Pickett is a 70 y.o. year old female with the above mentioned PMH who presents for  follow-up.  Largely been staying home during pandemic.  No new provocative symptoms.  Her baseline shortness of breath is somewhat worse and she recently started glucocorticoids per her pulmonologist    Vitals:    05/28/21 1030   BP: 166/72   BP Location: Left arm   Patient Position: Sitting   Pulse: 87  "  SpO2: 98%   Weight: 60.7 kg (133 lb 12.8 oz)   Height: 162.6 cm (64\")       PHYSICAL EXAMINATION:  Physical Exam  Constitutional:       Appearance: She is well-developed.      Comments: Frail scoliotic   Neck:      Thyroid: No thyromegaly.      Vascular: No carotid bruit, hepatojugular reflux or JVD.      Trachea: No tracheal deviation.   Cardiovascular:      Rate and Rhythm: Normal rate and regular rhythm.      Chest Wall: PMI is not displaced.      Pulses: Normal pulses and intact distal pulses. No midsystolic click and no opening snap.           Radial pulses are 2+ on the right side and 2+ on the left side.        Dorsalis pedis pulses are 2+ on the right side and 2+ on the left side.        Posterior tibial pulses are 2+ on the right side and 2+ on the left side.      Heart sounds: S1 normal and S2 normal. Heart sounds not distant. No murmur heard.   No friction rub. No gallop.    Pulmonary:      Effort: Pulmonary effort is normal.      Breath sounds: Normal breath sounds. No wheezing or rales.   Abdominal:      General: Bowel sounds are normal.      Palpations: Abdomen is soft. There is no mass.      Tenderness: There is no abdominal tenderness. There is no guarding.   Musculoskeletal:      Cervical back: Normal range of motion and neck supple.         Diagnostic Data:    ECG 12 Lead    Date/Time: 5/28/2021 10:54 AM  Performed by: Bassam Leroy MD  Authorized by: Bassam Leroy MD   Previous ECG: no previous ECG available  Rhythm: sinus rhythm  BPM: 83    Clinical impression: non-specific ECG             No results found for: CHLPL, TRIG, HDL, LDLDIRECT  Lab Results   Component Value Date    GLUCOSE 91 04/19/2021    BUN 16 04/19/2021    CREATININE 0.63 04/19/2021     04/19/2021    K 4.3 04/19/2021     04/19/2021    CO2 29.7 (H) 04/19/2021     No results found for: HGBA1C  Lab Results   Component Value Date    WBC 11.09 (H) 04/19/2021    HGB 11.8 (L) 04/19/2021    HCT 36.5 04/19/2021    PLT " 384 04/19/2021       ASSESSMENT:   Diagnosis Plan   1. Paroxysmal atrial fibrillation (CMS/HCC)     2. Pulmonary hypertension (CMS/HCC)         PLAN:  1. Paroxysmal A. fib noted while hospitalized with respiratory infection short-lived continued suppression with beta-blockade.  Patient poor candidate for systemic anticoagulation  2. Pericardial effusion acute on chronic with no tamponade features and while hospitalized acceptable hemodynamics today  3. Pulmonary hypertension was secondary to underlying emphysematous changes concern for potential recurrent infections with aspiration given concomitant esophageal dysmotility syndrome  4. Hypertension uncontrolled the office today she is to obtain a home blood pressure monitor and contact of us if systolic pressure is above 150      Bassam Leroy MD, FACC

## 2021-06-03 DIAGNOSIS — J44.9 CHRONIC OBSTRUCTIVE PULMONARY DISEASE, UNSPECIFIED COPD TYPE (HCC): ICD-10-CM

## 2021-06-03 RX ORDER — ALBUTEROL SULFATE 90 UG/1
2 AEROSOL, METERED RESPIRATORY (INHALATION) EVERY 4 HOURS PRN
Qty: 54 G | Refills: 2 | Status: SHIPPED | OUTPATIENT
Start: 2021-06-03

## 2021-06-24 ENCOUNTER — OFFICE VISIT (OUTPATIENT)
Dept: PULMONOLOGY | Facility: CLINIC | Age: 71
End: 2021-06-24

## 2021-06-24 VITALS
WEIGHT: 137.25 LBS | SYSTOLIC BLOOD PRESSURE: 130 MMHG | OXYGEN SATURATION: 97 % | HEART RATE: 86 BPM | TEMPERATURE: 98.2 F | RESPIRATION RATE: 18 BRPM | BODY MASS INDEX: 23.43 KG/M2 | HEIGHT: 64 IN | DIASTOLIC BLOOD PRESSURE: 64 MMHG

## 2021-06-24 DIAGNOSIS — J45.50 SEVERE PERSISTENT STEROID-DEPENDENT ASTHMA WITHOUT COMPLICATION: Primary | ICD-10-CM

## 2021-06-24 PROCEDURE — 99214 OFFICE O/P EST MOD 30 MIN: CPT | Performed by: INTERNAL MEDICINE

## 2021-06-24 RX ORDER — OMEPRAZOLE 40 MG/1
CAPSULE, DELAYED RELEASE ORAL
COMMUNITY
Start: 2021-05-27

## 2021-06-24 RX ORDER — PREDNISONE 10 MG/1
10 TABLET ORAL DAILY
Qty: 60 TABLET | Refills: 1 | Status: SHIPPED | OUTPATIENT
Start: 2021-06-24 | End: 2021-11-17 | Stop reason: SDUPTHER

## 2021-06-24 NOTE — PROGRESS NOTES
CC:    Lung nodules / COPD    HPI:    68 y/o WF w/ complex PMH:  120 py smoking quit 2010, COPD, NSCLC s/p resection RLL 2000 (presumeably early stage - no chemo/RT), Hodgkins Lymphoma 1980 s/p chest RT and spleenectomy, Ovarian Ca 1975 s/p RT, hypothyroidism, achalasia referred by Dr. Pelletier for lung nodules.  She recently had an esophageal dilation followed by CT chest in Feb that showed achalasia.  In addition the CT scan showed several sub-centimeter scattered nodules.  Patient quit smoking as mentioned above in 2010.  She does report significant ZHONG with minimal activity (showering, dressing, ADL's, walking up steps).  Does ok walking on a flat surface.  No other complaints.    Repeat CT showed a chest wall based cavitary RML lesion.  Radiology recommended CT guided biopsy.  She went for this and the lesion decreased in size so the biopsy was cancelled.   She had a PEG placed for her severe esophageal stenosis and continues to get repeat dilations.    PEG tube has been removed.  Patient has had persistent fistula that was closed endoscopically.  She has gained weight, is tolerating po, and now has a healthier BMI of 24.    Had Echo w/ mod AI and elevated RVSP 35-45..        INTERVAL HISTORY:    Patient returns for follow up.  Doing dramatically better since starting prednisone 10 mg daily last visit, c/w severe, steroid dependent asthma.    PMH:    Past Medical History:   Diagnosis Date   • Breast cancer (CMS/HCC)    • Cancer (CMS/HCC)     lung   • Colon polyp    • COPD (chronic obstructive pulmonary disease) (CMS/HCC)    • Disease of thyroid gland    • Dysrhythmia     tachycardia   • Frequent PVCs     bigeminal pvc   • GERD (gastroesophageal reflux disease)    • History of pneumonia    • Hodgkin's disease (CMS/HCC)    • Hypertension    • Lung cancer (CMS/HCC)    • Ovarian cancer (CMS/HCC)      PSH:    Past Surgical History:   Procedure Laterality Date   • BREAST LUMPECTOMY     • CATARACT EXTRACTION     •  CATARACT EXTRACTION W/ INTRAOCULAR LENS IMPLANTW/ TRABECULECTOMY     • COLONOSCOPY      limited due to scar tissue more thatn 10 years ago   • ENDOSCOPY     • ENDOSCOPY N/A 3/19/2020    Procedure: ESOPHAGOGASTRODUODENOSCOPY WITH ARGON PLASMA COAGULATOR;  Surgeon: Felice Mcintosh MD;  Location:  [facility];  Service: Gastroenterology;  Laterality: N/A;   • ENDOSCOPY N/A 2020    Procedure: ESOPHAGOGASTRODUODENOSCOPY;  Surgeon: Feilce Mcintosh MD;  Location:  [facility];  Service: Gastroenterology;  Laterality: N/A;  APC was used to remove ovesco clip and another one was placed.    • ENDOSCOPY N/A 2020    Procedure: ESOPHAGOGASTRODUODENOSCOPY;  Surgeon: Felice Mcintosh MD;  Location:  [facility];  Service: Gastroenterology;  Laterality: N/A;  balloon dilation done using 18-20 balloon   • GASTROSTOMY FEEDING TUBE INSERTION      [facility]   • HIP ARTHROSCOPY Right     total hip replacement    • HYSTERECTOMY     • LUNG SURGERY     • RETINAL DETACHMENT REPAIR     • SPLENECTOMY     • THYROIDECTOMY  2012    partial   • UPPER GASTROINTESTINAL ENDOSCOPY  2018 x 3    esophageal strictures by [facility]     FH:    Family History   Problem Relation Age of Onset   • Cancer Mother    • Lung cancer Mother    • Heart disease Father    • Heart disease Brother    • Breast cancer Maternal Grandmother    • Colon polyps Sister    • Colon cancer Neg Hx      SH:    Social History     Socioeconomic History   • Marital status:      Spouse name: Not on file   • Number of children: Not on file   • Years of education: Not on file   • Highest education level: Not on file   Tobacco Use   • Smoking status: Former Smoker     Packs/day: 3.00     Years: 40.00     Pack years: 120.00     Types: Cigarettes     Quit date: 5/10/2010     Years since quittin.1   • Smokeless tobacco: Never Used   Substance and Sexual Activity   • Alcohol use: Yes      Comment: Socially    • Drug use: Yes     Types: Marijuana     Comment: brownie occas   • Sexual activity: Defer     ALLERGIES:    No Known Allergies  MEDICATIONS:      Current Outpatient Medications:   •  albuterol sulfate  (90 Base) MCG/ACT inhaler, Inhale 2 puffs Every 4 (Four) Hours As Needed for Wheezing or Shortness of Air. Use with spacer, Disp: 54 g, Rfl: 2  •  aspirin 81 MG chewable tablet, Chew 81 mg Daily., Disp: , Rfl:   •  Brovana 15 MCG/2ML nebulizer solution, 15 mcg 2 (Two) Times a Day., Disp: , Rfl:   •  budesonide (PULMICORT) 0.5 MG/2ML nebulizer solution, Take 0.5 mg by nebulization Daily., Disp: , Rfl:   •  diazePAM (VALIUM) 5 MG tablet, if needed., Disp: , Rfl:   •  fluticasone (Flonase) 50 MCG/ACT nasal spray, 2 sprays by Each Nare route 2 (Two) Times a Day., Disp: 18.2 mL, Rfl: 11  •  levocetirizine (Xyzal) 5 MG tablet, Take 1 tablet by mouth Every Evening., Disp: 30 tablet, Rfl: 11  •  metoprolol tartrate (LOPRESSOR) 50 MG tablet, Take 1 tablet by mouth 2 (Two) Times a Day., Disp: , Rfl:   •  montelukast (SINGULAIR) 10 MG tablet, Take 1 tablet by mouth every night at bedtime., Disp: 30 tablet, Rfl: 3  •  omeprazole (priLOSEC) 20 MG capsule, Take 20 mg by mouth Daily., Disp: , Rfl:   •  omeprazole (priLOSEC) 40 MG capsule, , Disp: , Rfl:   •  predniSONE (DELTASONE) 10 MG tablet, Take 1 tablet by mouth Daily., Disp: 60 tablet, Rfl: 1  •  Spacer/Aero-Holding Chambers (E-Z Spacer) inhaler, Use as instructed, Disp: 1 each, Rfl: 2  •  tiotropium bromide monohydrate (Spiriva Respimat) 2.5 MCG/ACT aerosol solution inhaler, Inhale 2 puffs Daily., Disp: 1 inhaler, Rfl: 11  •  traZODone (DESYREL) 100 MG tablet, Take 100 mg by mouth As Needed., Disp: , Rfl:   •  vitamin C (ASCORBIC ACID) 500 MG tablet, Take 500 mg by mouth 2 (Two) Times a Day., Disp: , Rfl:   ROS:  Per HPI, otherwise all systems reviewed and negative.    DIAGNOSTIC DATA (Reviewed and interpreted by me unless otherwise  specified):    PFT 5/10/18 - severe obstruction, no change w/ BD, air trapping, normal DLCO    PFT 3/4/20 - very severe obstruction, + air trapping, normal DLCO    PFT 10/15/20 - severe obstruction, no restriction, +air trapping, mildly reduced DLCO    PFT 5/27/21 - severe obstruction, no change w/ BD w/ ATS criteria, no restriction, +air trapping, mild reduction in DLCO corrects for alveolar volume    6MW 10/15/20 - 99% on RA start, min sat 99% on RA, walked 274 meters 62% predicted    CT Chest 2/21/18 - prior RLL lobectomy, radiation changes near mediastinum bilateral upper lobes, emphysema, multiple sub-centimeter nodules, achalasia w/ fluid filled esophagus, prior spleenectomy    CT Chest 2/6/19 - stable from prior, sub-centimeter nodules stable    CT Chest 2/6/20 - stable nodules, other findings stable    CT Chest 3/19/21 - Lung RADS 2    Vitals:    06/24/21 1507   BP: 130/64   Pulse: 86   Resp: 18   Temp: 98.2 °F (36.8 °C)   SpO2: 97%       Physical Exam   Constitutional: Oriented to person, place, and time.   Head: Normocephalic and atraumatic.   Nose: Nose normal.   Mouth/Throat: normal.  Eyes: Conjunctivae are normal.  Pupils normal.  Neck: No tracheal deviation present.   Cardiovascular: Normal rate, regular rhythm, normal heart sounds and intact distal pulses.  Exam reveals no gallop and no friction rub.  No thrill.  No JVD.  No edema.  No murmur heard.  Pulmonary/Chest: Effort normal and breath sounds with prolonged expiratory phase.  No tenderness to palpation.  No clubbing.   Abdominal: Soft. Bowel sounds are normal. No distension. No tenderness. There is no guarding.   Musculoskeletal: Normal range of motion.  No tenderness.  Lymphadenopathy:  No cervical adenopathy.   Neurological:  No new focal neurological deficits observed   Skin: Skin is warm and dry. No rash noted.   Psychiatric: Normal mood and affect.  Behavior is normal. Judgment normal.    Assessment/Plan     1)  GOLD 4D COPD w/ Severe  Persistent Asthma Overlap - Continue pulmicort neb 0.5 mg bid, continue brovana miles bid.  Markers of allergic inflammation suggestive of significant Asthmatic component.  High risk for pneumonia w/ achalasia and fluid filled esophagus.  Changed Spiriva to Yupelri neb once daily last visit.  Patients QOL and breathing are severely impaired.  We discussed risks and benefits of trying low dose prednisone, she would like to give it a try.  Prednisone 10 mg daily started last visit 5/28/21.  She has had dramatic clinical improvement consistent with steroid dependent asthma.  Start dupixent 600 mg load and 300 mg q14 days.  Re-assess in about 6 weeks.  If still doing well, will start tapering off prednisone.    2)  Lung Nodules - sub-centimeter, stable x 2 years.  Given smoking history she still qualifies for lung cancer screening.  Due for next scan 3/2022.  Order for scan is in.    3)  Allergic Rhinitis - flonase, anti-histamine, singulair    4) Nocturnal Hypoxemia - continue 2 lpm hs    RTC 6 weeks    Gage Collier MD  Pulmonology and Critical Care Medicine  06/24/21 17:20 EDT  Electronically Signed    C.C.:  No ref. provider found, Lucoh Peters MD

## 2021-06-25 DIAGNOSIS — Z87.891 HISTORY OF TOBACCO USE, PRESENTING HAZARDS TO HEALTH: Primary | ICD-10-CM

## 2021-06-29 NOTE — TELEPHONE ENCOUNTER
Called to let her know her ECHO showed no pericardial effusion and to cancel CTS follow up per Dr Collier.    Infusion Nursing Note:  Mandi Yunkassidy presents today for Venofer.    Patient seen by provider today: No   present during visit today: Not Applicable.    Note: N/A.      Intravenous Access:  Peripheral IV placed.    Treatment Conditions:  Not Applicable.      Post Infusion Assessment:  Patient tolerated infusion without incident.  Blood return noted pre and post infusion.  Site patent and intact, free from redness, edema or discomfort.  No evidence of extravasations.  Access discontinued per protocol.       Discharge Plan:   Discharge instructions reviewed with: Patient.  Patient and/or family verbalized understanding of discharge instructions and all questions answered.  AVS to patient via [employer].  Patient will return 7/6/2021 for next appointment.   Patient discharged in stable condition accompanied by: self.  Departure Mode: Ambulatory.      Cecy Ash RN

## 2021-07-07 DIAGNOSIS — J44.9 CHRONIC OBSTRUCTIVE PULMONARY DISEASE, UNSPECIFIED COPD TYPE (HCC): ICD-10-CM

## 2021-07-08 RX ORDER — MONTELUKAST SODIUM 10 MG/1
TABLET ORAL
Qty: 90 TABLET | Refills: 3 | Status: SHIPPED | OUTPATIENT
Start: 2021-07-08 | End: 2022-04-28

## 2021-07-21 ENCOUNTER — TELEPHONE (OUTPATIENT)
Dept: PULMONOLOGY | Facility: CLINIC | Age: 71
End: 2021-07-21

## 2021-07-21 NOTE — TELEPHONE ENCOUNTER
Patient called concerned that she received denial letter re: Dupixent. Informed patient that we have uploaded medical records and sent an appeal today.

## 2021-07-27 ENCOUNTER — TELEPHONE (OUTPATIENT)
Dept: PULMONOLOGY | Facility: CLINIC | Age: 71
End: 2021-07-27

## 2021-07-27 DIAGNOSIS — J45.50 SEVERE PERSISTENT ASTHMA WITHOUT COMPLICATION: Primary | ICD-10-CM

## 2021-07-27 NOTE — TELEPHONE ENCOUNTER
Approved through [employer] for Dupixent PFS on 7/22/21 (after appeal) through 12/31/21. Script for loading and maintenance doses sent to [facility] and enrollment started.     Called patient with update and provided numbers to pharmacy and [employer]. She will call back once delivery set.

## 2021-11-17 DIAGNOSIS — J44.9 CHRONIC OBSTRUCTIVE PULMONARY DISEASE, UNSPECIFIED COPD TYPE (HCC): Primary | ICD-10-CM

## 2021-11-17 DIAGNOSIS — J45.50 SEVERE PERSISTENT ASTHMA WITHOUT COMPLICATION: ICD-10-CM

## 2021-11-17 RX ORDER — PREDNISONE 10 MG/1
10 TABLET ORAL DAILY
Qty: 14 TABLET | Refills: 0 | Status: SHIPPED | OUTPATIENT
Start: 2021-11-17 | End: 2022-03-14

## 2021-11-24 ENCOUNTER — OFFICE VISIT (OUTPATIENT)
Dept: PULMONOLOGY | Facility: CLINIC | Age: 71
End: 2021-11-24

## 2021-11-24 VITALS
DIASTOLIC BLOOD PRESSURE: 72 MMHG | OXYGEN SATURATION: 97 % | HEIGHT: 64 IN | WEIGHT: 139.5 LBS | HEART RATE: 90 BPM | BODY MASS INDEX: 23.82 KG/M2 | RESPIRATION RATE: 18 BRPM | TEMPERATURE: 98 F | SYSTOLIC BLOOD PRESSURE: 146 MMHG

## 2021-11-24 DIAGNOSIS — J45.50 SEVERE PERSISTENT STEROID-DEPENDENT ASTHMA WITHOUT COMPLICATION: Primary | ICD-10-CM

## 2021-11-24 PROCEDURE — 99213 OFFICE O/P EST LOW 20 MIN: CPT | Performed by: INTERNAL MEDICINE

## 2021-11-24 RX ORDER — AZITHROMYCIN 250 MG/1
TABLET, FILM COATED ORAL
Qty: 6 TABLET | Refills: 0 | Status: SHIPPED | OUTPATIENT
Start: 2021-11-24 | End: 2022-08-17

## 2021-11-24 RX ORDER — PREDNISONE 1 MG/1
5 TABLET ORAL DAILY
Qty: 30 TABLET | Refills: 2 | Status: SHIPPED | OUTPATIENT
Start: 2021-11-24 | End: 2021-12-24

## 2021-11-24 NOTE — PROGRESS NOTES
CC:    Lung nodules / COPD    HPI:    68 y/o WF w/ complex PMH:  120 py smoking quit 2010, COPD, NSCLC s/p resection RLL 2000 (presumeably early stage - no chemo/RT), Hodgkins Lymphoma 1980 s/p chest RT and spleenectomy, Ovarian Ca 1975 s/p RT, hypothyroidism, achalasia referred by Dr. Pelletier for lung nodules.  She recently had an esophageal dilation followed by CT chest in Feb that showed achalasia.  In addition the CT scan showed several sub-centimeter scattered nodules.  Patient quit smoking as mentioned above in 2010.  She does report significant ZHONG with minimal activity (showering, dressing, ADL's, walking up steps).  Does ok walking on a flat surface.  No other complaints.    Repeat CT showed a chest wall based cavitary RML lesion.  Radiology recommended CT guided biopsy.  She went for this and the lesion decreased in size so the biopsy was cancelled.   She had a PEG placed for her severe esophageal stenosis and continues to get repeat dilations.    PEG tube has been removed.  Patient has had persistent fistula that was closed endoscopically.  She has gained weight, is tolerating po, and now has a healthier BMI of 24.    Had Echo w/ mod AI and elevated RVSP 35-45..        INTERVAL HISTORY:    Patient returns for follow up.  Doing dramatically better since starting prednisone 10 mg daily last visit, c/w severe, steroid dependent asthma.  Still having trouble getting the Dupixent approved.    PMH:    Past Medical History:   Diagnosis Date   • Breast cancer (HCC)    • Cancer (HCC)     lung   • Colon polyp    • COPD (chronic obstructive pulmonary disease) (HCC)    • Disease of thyroid gland    • Dysrhythmia     tachycardia   • Frequent PVCs     bigeminal pvc   • GERD (gastroesophageal reflux disease)    • History of pneumonia    • Hodgkin's disease (HCC)    • Hypertension    • Lung cancer (HCC)    • Ovarian cancer (HCC)      PSH:    Past Surgical History:   Procedure Laterality Date   • BREAST LUMPECTOMY     •  CATARACT EXTRACTION     • CATARACT EXTRACTION W/ INTRAOCULAR LENS IMPLANTW/ TRABECULECTOMY     • COLONOSCOPY      limited due to scar tissue more thatn 10 years ago   • ENDOSCOPY     • ENDOSCOPY N/A 3/19/2020    Procedure: ESOPHAGOGASTRODUODENOSCOPY WITH ARGON PLASMA COAGULATOR;  Surgeon: Felice Mcintosh MD;  Location:  [facility];  Service: Gastroenterology;  Laterality: N/A;   • ENDOSCOPY N/A 2020    Procedure: ESOPHAGOGASTRODUODENOSCOPY;  Surgeon: Felice Mcintosh MD;  Location:  [facility];  Service: Gastroenterology;  Laterality: N/A;  APC was used to remove ovesco clip and another one was placed.    • ENDOSCOPY N/A 2020    Procedure: ESOPHAGOGASTRODUODENOSCOPY;  Surgeon: Felice Mcintosh MD;  Location:  [facility];  Service: Gastroenterology;  Laterality: N/A;  balloon dilation done using 18-20 balloon   • GASTROSTOMY FEEDING TUBE INSERTION      [facility]   • HIP ARTHROSCOPY Right     total hip replacement    • HYSTERECTOMY     • LUNG SURGERY     • RETINAL DETACHMENT REPAIR     • SPLENECTOMY     • THYROIDECTOMY  2012    partial   • UPPER GASTROINTESTINAL ENDOSCOPY  2018 x 3    esophageal strictures by [facility]     FH:    Family History   Problem Relation Age of Onset   • Cancer Mother    • Lung cancer Mother    • Heart disease Father    • Heart disease Brother    • Breast cancer Maternal Grandmother    • Colon polyps Sister    • Colon cancer Neg Hx      SH:    Social History     Socioeconomic History   • Marital status:    Tobacco Use   • Smoking status: Former Smoker     Packs/day: 3.00     Years: 40.00     Pack years: 120.00     Types: Cigarettes     Quit date: 5/10/2010     Years since quittin.5   • Smokeless tobacco: Never Used   Substance and Sexual Activity   • Alcohol use: Yes     Comment: Socially    • Drug use: Yes     Types: Marijuana     Comment: brownie occas   • Sexual activity: Defer      ALLERGIES:    No Known Allergies  MEDICATIONS:      Current Outpatient Medications:   •  albuterol sulfate  (90 Base) MCG/ACT inhaler, Inhale 2 puffs Every 4 (Four) Hours As Needed for Wheezing or Shortness of Air. Use with spacer, Disp: 54 g, Rfl: 2  •  aspirin 81 MG chewable tablet, Chew 81 mg Daily., Disp: , Rfl:   •  Brovana 15 MCG/2ML nebulizer solution, 15 mcg 2 (Two) Times a Day., Disp: , Rfl:   •  budesonide (PULMICORT) 0.5 MG/2ML nebulizer solution, Take 0.5 mg by nebulization Daily., Disp: , Rfl:   •  diazePAM (VALIUM) 5 MG tablet, if needed., Disp: , Rfl:   •  fluticasone (Flonase) 50 MCG/ACT nasal spray, 2 sprays by Each Nare route 2 (Two) Times a Day., Disp: 18.2 mL, Rfl: 11  •  levocetirizine (Xyzal) 5 MG tablet, Take 1 tablet by mouth Every Evening., Disp: 30 tablet, Rfl: 11  •  metoprolol tartrate (LOPRESSOR) 50 MG tablet, Take 1 tablet by mouth 2 (Two) Times a Day., Disp: , Rfl:   •  montelukast (SINGULAIR) 10 MG tablet, TAKE 1 TABLET AT BEDTIME, Disp: 90 tablet, Rfl: 3  •  omeprazole (priLOSEC) 20 MG capsule, Take 20 mg by mouth Daily., Disp: , Rfl:   •  omeprazole (priLOSEC) 40 MG capsule, , Disp: , Rfl:   •  predniSONE (DELTASONE) 10 MG tablet, Take 1 tablet by mouth Daily., Disp: 14 tablet, Rfl: 0  •  tiotropium bromide monohydrate (Spiriva Respimat) 2.5 MCG/ACT aerosol solution inhaler, Inhale 2 puffs Daily., Disp: 1 inhaler, Rfl: 11  •  traZODone (DESYREL) 100 MG tablet, Take 100 mg by mouth As Needed., Disp: , Rfl:   •  vitamin C (ASCORBIC ACID) 500 MG tablet, Take 500 mg by mouth 2 (Two) Times a Day., Disp: , Rfl:   •  Dupilumab 300 MG/2ML solution prefilled syringe, Inject 300 mg under the skin into the appropriate area as directed Every 14 (Fourteen) Days. Maintenance, starting day 15., Disp: 2 each, Rfl: 11  ROS:  Per HPI, otherwise all systems reviewed and negative.    DIAGNOSTIC DATA (Reviewed and interpreted by me unless otherwise specified):    PFT 5/10/18 - severe  obstruction, no change w/ BD, air trapping, normal DLCO    PFT 3/4/20 - very severe obstruction, + air trapping, normal DLCO    PFT 10/15/20 - severe obstruction, no restriction, +air trapping, mildly reduced DLCO    PFT 5/27/21 - severe obstruction, no change w/ BD w/ ATS criteria, no restriction, +air trapping, mild reduction in DLCO corrects for alveolar volume    6MW 10/15/20 - 99% on RA start, min sat 99% on RA, walked 274 meters 62% predicted    CT Chest 2/21/18 - prior RLL lobectomy, radiation changes near mediastinum bilateral upper lobes, emphysema, multiple sub-centimeter nodules, achalasia w/ fluid filled esophagus, prior spleenectomy    CT Chest 2/6/19 - stable from prior, sub-centimeter nodules stable    CT Chest 2/6/20 - stable nodules, other findings stable    CT Chest 3/19/21 - Lung RADS 2    Vitals:    11/24/21 1014   BP: 146/72   Pulse: 90   Resp: 18   Temp: 98 °F (36.7 °C)   SpO2: 97%       Physical Exam   Constitutional: Oriented to person, place, and time.   Head: Normocephalic and atraumatic.   Nose: Nose normal.   Mouth/Throat: normal.  Eyes: Conjunctivae are normal.  Pupils normal.  Neck: No tracheal deviation present.   Cardiovascular: Normal rate, regular rhythm, normal heart sounds and intact distal pulses.  Exam reveals no gallop and no friction rub.  No thrill.  No JVD.  No edema.  No murmur heard.  Pulmonary/Chest: Effort normal and breath sounds with prolonged expiratory phase.  No tenderness to palpation.  No clubbing.   Abdominal: Soft. Bowel sounds are normal. No distension. No tenderness. There is no guarding.   Musculoskeletal: Normal range of motion.  No tenderness.  Lymphadenopathy:  No cervical adenopathy.   Neurological:  No new focal neurological deficits observed   Skin: Skin is warm and dry. No rash noted.   Psychiatric: Normal mood and affect.  Behavior is normal. Judgment normal.    Assessment/Plan     1)  GOLD 4D COPD w/ Severe Persistent Asthma Overlap - Continue  pulmicort neb 0.5 mg bid, continue brovana miles bid.  Markers of allergic inflammation suggestive of significant Asthmatic component.  High risk for pneumonia w/ achalasia and fluid filled esophagus.  Changed Spiriva to Yupelri neb once daily last visit.  Patients QOL and breathing are severely impaired.  We discussed risks and benefits of trying low dose prednisone.  Prednisone 10 mg daily started last visit 5/28/21.  She has had dramatic clinical improvement consistent with steroid dependent asthma.      Would like to start dupixent 600 mg load and 300 mg q14 days.  Having issues getting it approved. Will have office look in to this.    Try to taper prednisone down to 5 mg daily.  Would like to taper off after starting dupixent.    Z-pack for action plan.    2)  Lung Nodules - sub-centimeter, stable x 2 years.  Given smoking history she still qualifies for lung cancer screening.  Due for next scan 3/2022.  Order for scan is in.    3)  Allergic Rhinitis - flonase, anti-histamine, singulair    4) Nocturnal Hypoxemia - continue 2 lpm hs    RTC 3 months    Gage Collier MD  Pulmonology and Critical Care Medicine  11/24/21 12:20 EST  Electronically Signed    C.C.:  No ref. provider found, Lucho Peters MD

## 2021-11-29 RX ORDER — LEVOCETIRIZINE DIHYDROCHLORIDE 5 MG/1
TABLET, FILM COATED ORAL
Qty: 90 TABLET | Refills: 3 | Status: SHIPPED | OUTPATIENT
Start: 2021-11-29 | End: 2022-09-28

## 2022-03-14 ENCOUNTER — HOSPITAL ENCOUNTER (OUTPATIENT)
Dept: CT IMAGING | Facility: HOSPITAL | Age: 72
Discharge: HOME | End: 2022-03-14
Admitting: INTERNAL MEDICINE

## 2022-03-14 ENCOUNTER — OFFICE VISIT (OUTPATIENT)
Dept: PULMONOLOGY | Facility: CLINIC | Age: 72
End: 2022-03-14

## 2022-03-14 VITALS
HEART RATE: 99 BPM | DIASTOLIC BLOOD PRESSURE: 90 MMHG | OXYGEN SATURATION: 96 % | WEIGHT: 136 LBS | SYSTOLIC BLOOD PRESSURE: 156 MMHG | TEMPERATURE: 97.8 F | HEIGHT: 64 IN | RESPIRATION RATE: 18 BRPM | BODY MASS INDEX: 23.22 KG/M2

## 2022-03-14 DIAGNOSIS — R06.02 SHORTNESS OF BREATH: Primary | ICD-10-CM

## 2022-03-14 PROCEDURE — 71250 CT THORAX DX C-: CPT

## 2022-03-14 PROCEDURE — 99214 OFFICE O/P EST MOD 30 MIN: CPT | Performed by: INTERNAL MEDICINE

## 2022-03-14 RX ORDER — PREDNISONE 1 MG/1
5 TABLET ORAL DAILY
COMMUNITY
End: 2022-03-14

## 2022-03-14 RX ORDER — PREDNISONE 10 MG/1
TABLET ORAL
Qty: 35 TABLET | Refills: 0 | Status: SHIPPED | OUTPATIENT
Start: 2022-03-14 | End: 2022-04-13

## 2022-03-14 RX ORDER — PREDNISONE 10 MG/1
10 TABLET ORAL DAILY
Qty: 30 TABLET | Refills: 3 | Status: SHIPPED | OUTPATIENT
Start: 2022-04-12 | End: 2022-08-15

## 2022-03-14 NOTE — PROGRESS NOTES
CC:    Lung nodules / COPD    HPI:    71 y.o. WF w/ complex PMH:  120 py smoking quit 2010, COPD, NSCLC s/p resection RLL 2000 (presumeably early stage - no chemo/RT), Hodgkins Lymphoma 1980 s/p chest RT and spleenectomy, Ovarian Ca 1975 s/p RT, hypothyroidism, achalasia referred by Dr. Pelletier for lung nodules.  She recently had an esophageal dilation followed by CT chest in Feb that showed achalasia.  In addition the CT scan showed several sub-centimeter scattered nodules.  Patient quit smoking as mentioned above in 2010.  She does report significant ZHONG with minimal activity (showering, dressing, ADL's, walking up steps).  Does ok walking on a flat surface.  No other complaints.    Repeat CT showed a chest wall based cavitary RML lesion.  Radiology recommended CT guided biopsy.  She went for this and the lesion decreased in size so the biopsy was cancelled.   She had a PEG placed for her severe esophageal stenosis and continues to get repeat dilations.    PEG tube has been removed.  Patient has had persistent fistula that was closed endoscopically.  She has gained weight, is tolerating po, and now has a healthier BMI of 24.    Had Echo w/ mod AI and elevated RVSP 35-45.        INTERVAL HISTORY:    Patient returns for follow up.  Has been doing worse the past month.  More coughing, intermittently productive, shortness of breath, wheezing, etc.  Did have a round of abx and steroids that helped some, but now doing poorly again.    PMH:    Past Medical History:   Diagnosis Date   • Breast cancer (HCC)    • Cancer (HCC)     lung   • Colon polyp    • COPD (chronic obstructive pulmonary disease) (HCC)    • Disease of thyroid gland    • Dysrhythmia     tachycardia   • Frequent PVCs     bigeminal pvc   • GERD (gastroesophageal reflux disease)    • History of pneumonia    • Hodgkin's disease (HCC)    • Hypertension    • Lung cancer (HCC)    • Ovarian cancer (HCC)      PSH:    Past Surgical History:   Procedure Laterality  Date   • BREAST LUMPECTOMY     • CATARACT EXTRACTION     • CATARACT EXTRACTION W/ INTRAOCULAR LENS IMPLANTW/ TRABECULECTOMY     • COLONOSCOPY      limited due to scar tissue more thatn 10 years ago   • ENDOSCOPY     • ENDOSCOPY N/A 3/19/2020    Procedure: ESOPHAGOGASTRODUODENOSCOPY WITH ARGON PLASMA COAGULATOR;  Surgeon: Felice Mcintosh MD;  Location:  [facility];  Service: Gastroenterology;  Laterality: N/A;   • ENDOSCOPY N/A 2020    Procedure: ESOPHAGOGASTRODUODENOSCOPY;  Surgeon: Felice Mcintosh MD;  Location:  [facility];  Service: Gastroenterology;  Laterality: N/A;  APC was used to remove ovesco clip and another one was placed.    • ENDOSCOPY N/A 2020    Procedure: ESOPHAGOGASTRODUODENOSCOPY;  Surgeon: Felice Mcintosh MD;  Location:  [facility];  Service: Gastroenterology;  Laterality: N/A;  balloon dilation done using 18-20 balloon   • GASTROSTOMY FEEDING TUBE INSERTION      [facility]   • HIP ARTHROSCOPY Right     total hip replacement    • HYSTERECTOMY     • LUNG SURGERY     • RETINAL DETACHMENT REPAIR     • SPLENECTOMY     • THYROIDECTOMY  2012    partial   • UPPER GASTROINTESTINAL ENDOSCOPY  2018 x 3    esophageal strictures by [facility]     FH:    Family History   Problem Relation Age of Onset   • Cancer Mother    • Lung cancer Mother    • Heart disease Father    • Heart disease Brother    • Breast cancer Maternal Grandmother    • Colon polyps Sister    • Colon cancer Neg Hx      SH:    Social History     Socioeconomic History   • Marital status:    Tobacco Use   • Smoking status: Former Smoker     Packs/day: 3.00     Years: 40.00     Pack years: 120.00     Types: Cigarettes     Quit date: 5/10/2010     Years since quittin.8   • Smokeless tobacco: Never Used   Substance and Sexual Activity   • Alcohol use: Yes     Comment: Socially    • Drug use: Yes     Types: Marijuana     Comment: brownie  occas   • Sexual activity: Defer     ALLERGIES:    No Known Allergies  MEDICATIONS:      Current Outpatient Medications:   •  albuterol sulfate  (90 Base) MCG/ACT inhaler, Inhale 2 puffs Every 4 (Four) Hours As Needed for Wheezing or Shortness of Air. Use with spacer, Disp: 54 g, Rfl: 2  •  aspirin 81 MG chewable tablet, Chew 81 mg Daily., Disp: , Rfl:   •  azithromycin (ZITHROMAX) 250 MG tablet, Take 2 by mouth today then 1 daily for 4 days, Disp: 6 tablet, Rfl: 0  •  Brovana 15 MCG/2ML nebulizer solution, 15 mcg 2 (Two) Times a Day., Disp: , Rfl:   •  budesonide (PULMICORT) 0.5 MG/2ML nebulizer solution, Take 0.5 mg by nebulization Daily., Disp: , Rfl:   •  diazePAM (VALIUM) 5 MG tablet, Take 5 mg by mouth Every 8 (Eight) Hours As Needed., Disp: , Rfl:   •  Dupilumab 300 MG/2ML solution prefilled syringe, Inject 300 mg under the skin into the appropriate area as directed Every 14 (Fourteen) Days. Maintenance, starting day 15., Disp: 2 each, Rfl: 11  •  fluticasone (Flonase) 50 MCG/ACT nasal spray, 2 sprays by Each Nare route 2 (Two) Times a Day., Disp: 18.2 mL, Rfl: 11  •  levocetirizine (XYZAL) 5 MG tablet, TAKE 1 TABLET EVERY EVENING, Disp: 90 tablet, Rfl: 3  •  metoprolol tartrate (LOPRESSOR) 50 MG tablet, Take 1 tablet by mouth 2 (Two) Times a Day., Disp: , Rfl:   •  montelukast (SINGULAIR) 10 MG tablet, TAKE 1 TABLET AT BEDTIME, Disp: 90 tablet, Rfl: 3  •  omeprazole (priLOSEC) 20 MG capsule, Take 20 mg by mouth Daily., Disp: , Rfl:   •  omeprazole (priLOSEC) 40 MG capsule, , Disp: , Rfl:   •  predniSONE (DELTASONE) 10 MG tablet, Take 1 tablet by mouth Daily., Disp: 14 tablet, Rfl: 0  •  predniSONE (DELTASONE) 5 MG tablet, Take 5 mg by mouth Daily., Disp: , Rfl:   •  tiotropium bromide monohydrate (Spiriva Respimat) 2.5 MCG/ACT aerosol solution inhaler, Inhale 2 puffs Daily., Disp: 1 inhaler, Rfl: 11  •  traZODone (DESYREL) 100 MG tablet, Take 100 mg by mouth As Needed., Disp: , Rfl:   •  vitamin C  (ASCORBIC ACID) 500 MG tablet, Take 500 mg by mouth 2 (Two) Times a Day., Disp: , Rfl:   ROS:  Per HPI, otherwise all systems reviewed and negative.    DIAGNOSTIC DATA (Reviewed and interpreted by me unless otherwise specified):    PFT 5/10/18 - severe obstruction, no change w/ BD, air trapping, normal DLCO    PFT 3/4/20 - very severe obstruction, + air trapping, normal DLCO    PFT 10/15/20 - severe obstruction, no restriction, +air trapping, mildly reduced DLCO    PFT 5/27/21 - severe obstruction, no change w/ BD w/ ATS criteria, no restriction, +air trapping, mild reduction in DLCO corrects for alveolar volume    6MW 10/15/20 - 99% on RA start, min sat 99% on RA, walked 274 meters 62% predicted    CT Chest 2/21/18 - prior RLL lobectomy, radiation changes near mediastinum bilateral upper lobes, emphysema, multiple sub-centimeter nodules, achalasia w/ fluid filled esophagus, prior spleenectomy    CT Chest 2/6/19 - stable from prior, sub-centimeter nodules stable    CT Chest 2/6/20 - stable nodules, other findings stable    CT Chest 3/19/21 - Lung RADS 2    Vitals:    03/14/22 1213   BP: 156/90   Pulse: 99   Resp: 18   Temp: 97.8 °F (36.6 °C)   SpO2: 96%       Physical Exam   Constitutional: Oriented to person, place, and time.   Head: Normocephalic and atraumatic.   Nose: Nose normal.   Mouth/Throat: normal.  Eyes: Conjunctivae are normal.  Pupils normal.  Neck: No tracheal deviation present.   Cardiovascular: Normal rate, regular rhythm, normal heart sounds and intact distal pulses.  Exam reveals no gallop and no friction rub.  No thrill.  No JVD.  No edema.  No murmur heard.  Pulmonary/Chest: Effort normal and breath sounds with prolonged expiratory phase.  No tenderness to palpation.  No clubbing.   Abdominal: Soft. Bowel sounds are normal. No distension. No tenderness. There is no guarding.   Musculoskeletal: Normal range of motion.  No tenderness.  Lymphadenopathy:  No cervical adenopathy.   Neurological:  No  new focal neurological deficits observed   Skin: Skin is warm and dry. No rash noted.   Psychiatric: Normal mood and affect.  Behavior is normal. Judgment normal.    Assessment/Plan     1)  GOLD 4D COPD w/ Severe Persistent Asthma Overlap - Continue pulmicort neb 0.5 mg bid, continue brovana miles bid.  Markers of allergic inflammation suggestive of significant Asthmatic component.  High risk for pneumonia w/ achalasia and fluid filled esophagus.  Given samples of Yupelri neb once daily, can switch to spiriva when feeling better.  Patients QOL and breathing are severely impaired.  We discussed risks and benefits of trying low dose prednisone.  Prednisone 10 mg daily started 5/28/21.  She has had dramatic clinical improvement consistent with steroid dependent asthma.      Would like to start dupixent 600 mg load and 300 mg q14 days.  Having issues getting it approved. She is filling out paper work to get state assistance.    Seems to have decompensated clinically.  Will give 5 days of 20 mg prednisone and then increase maintenance dose back up to 10 mg from 5 mg.    Will get CT Chest w/o contrast ASAP to r/o pneumonia, etc.    2)  Lung Nodules - sub-centimeter, stable x 2 years.  Given smoking history she still qualifies for lung cancer screening.  Due for next scan 3/2023 assuming CT this week is ok.    3)  Allergic Rhinitis - flonase, anti-histamine, singulair    4) Nocturnal Hypoxemia - continue 2 lpm hs    RTC 3 months w/ Full PFT    Will call patient with CT results    Gage Collier MD  Pulmonology and Critical Care Medicine  03/14/22 12:21 EDT  Electronically Signed    C.C.:  No ref. provider found, Lucho Peters MD

## 2022-03-15 ENCOUNTER — APPOINTMENT (OUTPATIENT)
Dept: CT IMAGING | Facility: HOSPITAL | Age: 72
End: 2022-03-15

## 2022-03-15 ENCOUNTER — TELEPHONE (OUTPATIENT)
Dept: PULMONOLOGY | Facility: CLINIC | Age: 72
End: 2022-03-15

## 2022-03-15 DIAGNOSIS — R06.09 DYSPNEA ON EXERTION: ICD-10-CM

## 2022-03-15 DIAGNOSIS — R53.81 DEBILITY: Primary | ICD-10-CM

## 2022-03-15 NOTE — TELEPHONE ENCOUNTER
Called patient and left voicemail with results of CT scan, no change from 1 year prior.  Continue with current plan outlined in clinic visit 3/14/22.

## 2022-03-17 ENCOUNTER — HOME HEALTH ADMISSION (OUTPATIENT)
Dept: HOME HEALTH SERVICES | Facility: HOME HEALTHCARE | Age: 72
End: 2022-03-17

## 2022-03-23 ENCOUNTER — APPOINTMENT (OUTPATIENT)
Dept: CT IMAGING | Facility: HOSPITAL | Age: 72
End: 2022-03-23

## 2022-04-25 ENCOUNTER — OFFICE VISIT (OUTPATIENT)
Dept: PULMONOLOGY | Facility: CLINIC | Age: 72
End: 2022-04-25

## 2022-04-25 VITALS
TEMPERATURE: 98 F | HEART RATE: 108 BPM | DIASTOLIC BLOOD PRESSURE: 80 MMHG | HEIGHT: 64 IN | WEIGHT: 130 LBS | RESPIRATION RATE: 22 BRPM | SYSTOLIC BLOOD PRESSURE: 148 MMHG | BODY MASS INDEX: 22.2 KG/M2 | OXYGEN SATURATION: 96 %

## 2022-04-25 DIAGNOSIS — R06.02 SHORTNESS OF BREATH: Primary | ICD-10-CM

## 2022-04-25 PROCEDURE — 99214 OFFICE O/P EST MOD 30 MIN: CPT | Performed by: INTERNAL MEDICINE

## 2022-04-26 NOTE — PROGRESS NOTES
"CC:    Lung nodules / COPD    HPI:    71 y.o. WF w/ complex PMH:  120 py smoking quit 2010, COPD, NSCLC s/p resection RLL 2000 (presumeably early stage - no chemo/RT), Hodgkins Lymphoma 1980 s/p chest RT and spleenectomy, Ovarian Ca 1975 s/p RT, hypothyroidism, achalasia referred by Dr. Pelletier for lung nodules.  She recently had an esophageal dilation followed by CT chest in Feb that showed achalasia.  In addition the CT scan showed several sub-centimeter scattered nodules.  Patient quit smoking as mentioned above in 2010.  She does report significant ZHONG with minimal activity (showering, dressing, ADL's, walking up steps).  Does ok walking on a flat surface.  No other complaints.    Repeat CT showed a chest wall based cavitary RML lesion.  Radiology recommended CT guided biopsy.  She went for this and the lesion decreased in size so the biopsy was cancelled.   She had a PEG placed for her severe esophageal stenosis and continues to get repeat dilations.    PEG tube has been removed.  Patient has had persistent fistula that was closed endoscopically.  She has gained weight, is tolerating po, and now has a healthier BMI of 24.    Had Echo w/ mod AI and elevated RVSP 35-45.        INTERVAL HISTORY:    Patient returns for follow up. Came in today for \"chest pain\".  This seems to be more her mid back radiating from midline outward bilaterally.  Also gets some pain around subxiphoid process.  Worse with movement and sometimes deep breath and coughing.  Not wheezing or coughing anymore than usual.  Just had recent CT 3/14/22 without change and CXR today without change.  Pain was relieved with what sounds like a norco.  Started around a week ago.    PMH:    Past Medical History:   Diagnosis Date   • Breast cancer (HCC)    • Cancer (HCC)     lung   • Colon polyp    • COPD (chronic obstructive pulmonary disease) (HCC)    • Disease of thyroid gland    • Dysrhythmia     tachycardia   • Frequent PVCs     bigeminal pvc   • GERD " (gastroesophageal reflux disease)    • History of pneumonia    • Hodgkin's disease (HCC)    • Hypertension    • Lung cancer (HCC)    • Ovarian cancer (HCC)      PSH:    Past Surgical History:   Procedure Laterality Date   • BREAST LUMPECTOMY     • CATARACT EXTRACTION     • CATARACT EXTRACTION W/ INTRAOCULAR LENS IMPLANTW/ TRABECULECTOMY     • COLONOSCOPY      limited due to scar tissue more thatn 10 years ago   • ENDOSCOPY     • ENDOSCOPY N/A 3/19/2020    Procedure: ESOPHAGOGASTRODUODENOSCOPY WITH ARGON PLASMA COAGULATOR;  Surgeon: Felice Mcintosh MD;  Location:  [facility];  Service: Gastroenterology;  Laterality: N/A;   • ENDOSCOPY N/A 4/7/2020    Procedure: ESOPHAGOGASTRODUODENOSCOPY;  Surgeon: Felice Mcintosh MD;  Location:  [facility];  Service: Gastroenterology;  Laterality: N/A;  APC was used to remove ovesco clip and another one was placed.    • ENDOSCOPY N/A 5/11/2020    Procedure: ESOPHAGOGASTRODUODENOSCOPY;  Surgeon: Felice Mcintosh MD;  Location:  [facility];  Service: Gastroenterology;  Laterality: N/A;  balloon dilation done using 18-20 balloon   • GASTROSTOMY FEEDING TUBE INSERTION  2018    [facility]   • HIP ARTHROSCOPY Right 2011    total hip replacement    • HYSTERECTOMY     • LUNG SURGERY  2000   • RETINAL DETACHMENT REPAIR  2015   • SPLENECTOMY  1980   • THYROIDECTOMY  2012    partial   • UPPER GASTROINTESTINAL ENDOSCOPY  2018 x 3    esophageal strictures by [facility]     FH:    Family History   Problem Relation Age of Onset   • Cancer Mother    • Lung cancer Mother    • Heart disease Father    • Heart disease Brother    • Breast cancer Maternal Grandmother    • Colon polyps Sister    • Colon cancer Neg Hx      SH:    Social History     Socioeconomic History   • Marital status:    Tobacco Use   • Smoking status: Former Smoker     Packs/day: 3.00     Years: 40.00     Pack years: 120.00     Types: Cigarettes     Quit date:  5/10/2010     Years since quittin.9   • Smokeless tobacco: Never Used   Substance and Sexual Activity   • Alcohol use: Yes     Comment: Socially    • Drug use: Yes     Types: Marijuana     Comment: brownie occas   • Sexual activity: Defer     ALLERGIES:    No Known Allergies  MEDICATIONS:      Current Outpatient Medications:   •  albuterol sulfate  (90 Base) MCG/ACT inhaler, Inhale 2 puffs Every 4 (Four) Hours As Needed for Wheezing or Shortness of Air. Use with spacer, Disp: 54 g, Rfl: 2  •  aspirin 81 MG chewable tablet, Chew 81 mg Daily., Disp: , Rfl:   •  azithromycin (ZITHROMAX) 250 MG tablet, Take 2 by mouth today then 1 daily for 4 days, Disp: 6 tablet, Rfl: 0  •  Brovana 15 MCG/2ML nebulizer solution, 15 mcg 2 (Two) Times a Day., Disp: , Rfl:   •  budesonide (PULMICORT) 0.5 MG/2ML nebulizer solution, Take 0.5 mg by nebulization Daily., Disp: , Rfl:   •  diazePAM (VALIUM) 5 MG tablet, Take 5 mg by mouth Every 8 (Eight) Hours As Needed., Disp: , Rfl:   •  fluticasone (Flonase) 50 MCG/ACT nasal spray, 2 sprays by Each Nare route 2 (Two) Times a Day., Disp: 18.2 mL, Rfl: 11  •  levocetirizine (XYZAL) 5 MG tablet, TAKE 1 TABLET EVERY EVENING, Disp: 90 tablet, Rfl: 3  •  metoprolol tartrate (LOPRESSOR) 50 MG tablet, Take 1 tablet by mouth 2 (Two) Times a Day., Disp: , Rfl:   •  montelukast (SINGULAIR) 10 MG tablet, TAKE 1 TABLET AT BEDTIME, Disp: 90 tablet, Rfl: 3  •  omeprazole (priLOSEC) 20 MG capsule, Take 20 mg by mouth Daily., Disp: , Rfl:   •  omeprazole (priLOSEC) 40 MG capsule, , Disp: , Rfl:   •  predniSONE (DELTASONE) 10 MG tablet, Take 1 tablet by mouth Daily., Disp: 30 tablet, Rfl: 3  •  tiotropium bromide monohydrate (Spiriva Respimat) 2.5 MCG/ACT aerosol solution inhaler, Inhale 2 puffs Daily., Disp: 1 inhaler, Rfl: 11  •  traZODone (DESYREL) 100 MG tablet, Take 100 mg by mouth As Needed., Disp: , Rfl:   •  vitamin C (ASCORBIC ACID) 500 MG tablet, Take 500 mg by mouth 2 (Two) Times a  Day., Disp: , Rfl:   •  Dupilumab 300 MG/2ML solution prefilled syringe, Inject 300 mg under the skin into the appropriate area as directed Every 14 (Fourteen) Days. Maintenance, starting day 15., Disp: 2 each, Rfl: 11  ROS:  Per HPI, otherwise all systems reviewed and negative.    DIAGNOSTIC DATA (Reviewed and interpreted by me unless otherwise specified):    PFT 5/10/18 - severe obstruction, no change w/ BD, air trapping, normal DLCO    PFT 3/4/20 - very severe obstruction, + air trapping, normal DLCO    PFT 10/15/20 - severe obstruction, no restriction, +air trapping, mildly reduced DLCO    PFT 5/27/21 - severe obstruction, no change w/ BD w/ ATS criteria, no restriction, +air trapping, mild reduction in DLCO corrects for alveolar volume    6MW 10/15/20 - 99% on RA start, min sat 99% on RA, walked 274 meters 62% predicted    CT Chest 2/21/18 - prior RLL lobectomy, radiation changes near mediastinum bilateral upper lobes, emphysema, multiple sub-centimeter nodules, achalasia w/ fluid filled esophagus, prior spleenectomy    CT Chest 2/6/19 - stable from prior, sub-centimeter nodules stable    CT Chest 2/6/20 - stable nodules, other findings stable    CT Chest 3/19/21 - Lung RADS 2    CT Chest 3/14/22 - no change from prior    CXR 4/25/22 - trachea midline, elevated right moise-diaphragm, calcified granulomatous disease, right mid lung linear scar.  No acute appearing pulmonary parenchymal disease.  Thoracic kyphosis.  No change from 11/6/2018.    Vitals:    04/25/22 1413   BP: 148/80   Pulse: 108   Resp: 22   Temp: 98 °F (36.7 °C)   SpO2: 96%       Physical Exam   Constitutional: Oriented to person, place, and time.   Head: Normocephalic and atraumatic.   Nose: Nose normal.   Mouth/Throat: normal.  Eyes: Conjunctivae are normal.  Pupils normal.  Neck: No tracheal deviation present.   Cardiovascular: Normal rate, regular rhythm, normal heart sounds and intact distal pulses.  Exam reveals no gallop and no friction  "rub.  No thrill.  No JVD.  No edema.  No murmur heard.  Pulmonary/Chest: Effort normal and breath sounds with prolonged expiratory phase.  No tenderness to palpation.  No clubbing.   Abdominal: Soft. Bowel sounds are normal. No distension. No tenderness. There is no guarding.   Musculoskeletal: Normal range of motion.  No tenderness.  Lymphadenopathy:  No cervical adenopathy.   Neurological:  No new focal neurological deficits observed   Skin: Skin is warm and dry. No rash noted.   Psychiatric: Normal mood and affect.  Behavior is normal. Judgment normal.    Assessment/Plan     1)  GOLD 4D COPD w/ Severe Persistent Asthma Overlap - Continue pulmicort neb 0.5 mg bid, continue brovana miles bid.  Markers of allergic inflammation suggestive of significant Asthmatic component.  High risk for pneumonia w/ achalasia and fluid filled esophagus.  Spiriva Respimat.  Patients QOL and breathing are severely impaired.  We discussed risks and benefits of trying low dose prednisone.  Prednisone 10 mg daily started 5/28/21.  She has had dramatic clinical improvement consistent with steroid dependent asthma.      Would like to start dupixent 600 mg load and 300 mg q14 days.  Apparently this was refused by her insurance unfortunately.    Was doing worse last visit from pulmonary standpoint and thought she might have \"pneumonia\".  No evidence for this on CT 3/14/22 and CXR 4/25/22.    2)  Lung Nodules - sub-centimeter, stable x 2 years.  Given smoking history she still qualifies for lung cancer screening.  Due for next scan 3/2023.    3)  Allergic Rhinitis - flonase, anti-histamine, singulair    4) Nocturnal Hypoxemia - continue 2 lpm hs    5) Back / Chest Pain - sounds musculoskeletal by history / exam / imaging, no clear evidence for decompensation from pulmonary standpoint.  Potentially visceral as well - I.e. esophageal / gastric, but I do not see a pulmonary cause.  Have encouraged her to f/u PCP ASAP, ER if pain worsens in " interim.    RTC 3-6 months    Gage Collier MD  Pulmonology and Critical Care Medicine  04/26/22 08:47 EDT  Electronically Signed    C.C.:  No ref. provider found, Lucho Peters MD

## 2022-04-28 DIAGNOSIS — J44.9 CHRONIC OBSTRUCTIVE PULMONARY DISEASE, UNSPECIFIED COPD TYPE: ICD-10-CM

## 2022-04-28 RX ORDER — MONTELUKAST SODIUM 10 MG/1
TABLET ORAL
Qty: 90 TABLET | Refills: 0 | Status: SHIPPED | OUTPATIENT
Start: 2022-04-28 | End: 2022-09-20 | Stop reason: SDUPTHER

## 2022-08-10 ENCOUNTER — OFFICE VISIT (OUTPATIENT)
Dept: ENDOCRINOLOGY | Facility: CLINIC | Age: 72
End: 2022-08-10

## 2022-08-10 VITALS
HEIGHT: 64 IN | HEART RATE: 118 BPM | BODY MASS INDEX: 21.34 KG/M2 | WEIGHT: 125 LBS | OXYGEN SATURATION: 99 % | DIASTOLIC BLOOD PRESSURE: 79 MMHG | SYSTOLIC BLOOD PRESSURE: 130 MMHG

## 2022-08-10 DIAGNOSIS — E05.90 HYPERTHYROIDISM: Primary | ICD-10-CM

## 2022-08-10 PROCEDURE — 99204 OFFICE O/P NEW MOD 45 MIN: CPT | Performed by: INTERNAL MEDICINE

## 2022-08-10 RX ORDER — METHIMAZOLE 10 MG/1
20 TABLET ORAL DAILY
Qty: 180 TABLET | Refills: 3 | Status: SHIPPED | OUTPATIENT
Start: 2022-08-10 | End: 2022-09-22

## 2022-08-10 NOTE — ASSESSMENT & PLAN NOTE
ALMOST CERTAINLY DUE TO GRAVES DISEASE. QUITE SYMPTOMATIC. WILL START METHIMAZOLE. SIDE EFFECTS WERE DISCUSSED.

## 2022-08-10 NOTE — PROGRESS NOTES
"     Office Note      Date: 08/10/2022  Patient Name: Ita Pickett  MRN: [id number]  : 1950    Chief Complaint   Patient presents with   • Thyroid Problem       History of Present Illness:   Ita Pickett is a 71 y.o. female who presents for Thyroid Problem  SHE IS SEEN AS A NEW PATIENT TODAY  -------------------------  HER THYROID STORY STARTS YEARS AGO. SHE HAD A PARTAL THYROIDECTOMY FOR OVERACTIVE THYROID YEARS AGO AND NEVER REQUIRED MEDS.  HER RECENT LABS SHOWED HYPERTHYROIDISM WITH SUPPRESSED TSH. SHE IS BOTHERED BY SYMPTOMS OF FATIGUE, TREMOR RAPID HEART BEAT, TEMPERATURE INTOL. AND INSOMNIA.  ------------------------    Subjective      Patient was born where: [place] .  Facial radiation exposure: Yes, describe: EXTERNAL BEAM FOR LYMPHOMA .  High iodine intake: Yes  Family hx of thyroid disease: Yes, describe: SIBLINGS .    Review of Systems:   Review of Systems   Constitutional: Positive for fatigue and unexpected weight change.   Respiratory: Positive for cough and shortness of breath.    Cardiovascular: Positive for palpitations.   Endocrine: Positive for cold intolerance and heat intolerance.   Neurological: Positive for tremors and weakness.   Psychiatric/Behavioral: Positive for agitation, dysphoric mood and sleep disturbance. The patient is nervous/anxious.        The following portions of the patient's history were reviewed and updated as appropriate: allergies, current medications, past family history, past medical history, past social history, past surgical history and problem list.    Objective     Visit Vitals  /79   Pulse 118   Ht 162.6 cm (64\")   Wt 56.7 kg (125 lb)   LMP  (LMP Unknown)   SpO2 99%   BMI 21.46 kg/m²       Labs:    CBC w/DIFF  Lab Results   Component Value Date    WBC 11.09 (H) 2021    RBC 4.00 2021    HGB 11.8 (L) 2021    HCT 36.5 2021    MCV 91.3 2021    MCH 29.5 2021    MCHC 32.3 2021    RDW 14.2 2021    RDWSD " 46.5 04/19/2021    MPV 11.5 04/19/2021     04/19/2021    NEUTRORELPCT 56.1 04/19/2021    LYMPHORELPCT 30.7 04/19/2021    MONORELPCT 9.7 04/19/2021    EOSRELPCT 2.5 04/19/2021    BASORELPCT 0.7 04/19/2021    AUTOIGPER 0.3 04/19/2021    NEUTROABS 6.21 04/19/2021    LYMPHSABS 3.41 (H) 04/19/2021    MONOSABS 1.08 (H) 04/19/2021    EOSABS 0.28 04/19/2021    BASOSABS 0.08 04/19/2021    AUTOIGNUM 0.03 04/19/2021    NRBC 0.1 04/19/2021       T4  No results found for: FREET4    TSH  No results found for: TSHBASE     Physical Exam:  Physical Exam  Vitals reviewed.   Constitutional:       General: She is in acute distress.      Appearance: She is ill-appearing and toxic-appearing.   HENT:      Head: Normocephalic and atraumatic.   Eyes:      Extraocular Movements: Extraocular movements intact.   Neck:      Comments: RIGHT THYROID LOBE PALPABLE   Cardiovascular:      Rate and Rhythm: Regular rhythm. Tachycardia present.   Pulmonary:      Effort: Respiratory distress present.   Lymphadenopathy:      Cervical: No cervical adenopathy.   Skin:     General: Skin is warm.   Neurological:      General: No focal deficit present.      Mental Status: She is alert.   Psychiatric:         Mood and Affect: Mood normal.         Thought Content: Thought content normal.         Judgment: Judgment normal.         Assessment / Plan      Assessment & Plan:  Problem List Items Addressed This Visit        Other    Hyperthyroidism - Primary    Overview     RECURRENT FOLLOWING REMOTE PARTIAL THYROIDECTOMY         Current Assessment & Plan     ALMOST CERTAINLY DUE TO GRAVES DISEASE. QUITE SYMPTOMATIC. WILL START METHIMAZOLE. SIDE EFFECTS WERE DISCUSSED.          Relevant Medications    metoprolol tartrate (LOPRESSOR) 50 MG tablet    predniSONE (DELTASONE) 10 MG tablet    methIMAzole (TAPAZOLE) 10 MG tablet           Dae Godoy MD   08/10/2022

## 2022-08-15 RX ORDER — PREDNISONE 10 MG/1
TABLET ORAL
Qty: 30 TABLET | Refills: 3 | Status: SHIPPED | OUTPATIENT
Start: 2022-08-15 | End: 2022-12-28

## 2022-08-17 ENCOUNTER — OFFICE VISIT (OUTPATIENT)
Dept: PULMONOLOGY | Facility: CLINIC | Age: 72
End: 2022-08-17

## 2022-08-17 VITALS
HEART RATE: 97 BPM | TEMPERATURE: 97.8 F | WEIGHT: 129.2 LBS | BODY MASS INDEX: 22.06 KG/M2 | OXYGEN SATURATION: 96 % | SYSTOLIC BLOOD PRESSURE: 122 MMHG | DIASTOLIC BLOOD PRESSURE: 76 MMHG | HEIGHT: 64 IN

## 2022-08-17 DIAGNOSIS — R05.9 COUGH: ICD-10-CM

## 2022-08-17 DIAGNOSIS — R06.02 SHORTNESS OF BREATH: Primary | ICD-10-CM

## 2022-08-17 PROCEDURE — 87077 CULTURE AEROBIC IDENTIFY: CPT | Performed by: INTERNAL MEDICINE

## 2022-08-17 PROCEDURE — 94729 DIFFUSING CAPACITY: CPT | Performed by: INTERNAL MEDICINE

## 2022-08-17 PROCEDURE — 87102 FUNGUS ISOLATION CULTURE: CPT | Performed by: INTERNAL MEDICINE

## 2022-08-17 PROCEDURE — 94726 PLETHYSMOGRAPHY LUNG VOLUMES: CPT | Performed by: INTERNAL MEDICINE

## 2022-08-17 PROCEDURE — 87206 SMEAR FLUORESCENT/ACID STAI: CPT | Performed by: INTERNAL MEDICINE

## 2022-08-17 PROCEDURE — 87070 CULTURE OTHR SPECIMN AEROBIC: CPT | Performed by: INTERNAL MEDICINE

## 2022-08-17 PROCEDURE — 87116 MYCOBACTERIA CULTURE: CPT | Performed by: INTERNAL MEDICINE

## 2022-08-17 PROCEDURE — 94375 RESPIRATORY FLOW VOLUME LOOP: CPT | Performed by: INTERNAL MEDICINE

## 2022-08-17 PROCEDURE — 87205 SMEAR GRAM STAIN: CPT | Performed by: INTERNAL MEDICINE

## 2022-08-17 PROCEDURE — 87186 SC STD MICRODIL/AGAR DIL: CPT | Performed by: INTERNAL MEDICINE

## 2022-08-17 PROCEDURE — 99214 OFFICE O/P EST MOD 30 MIN: CPT | Performed by: INTERNAL MEDICINE

## 2022-08-17 RX ORDER — PREDNISONE 10 MG/1
TABLET ORAL
Qty: 31 TABLET | Refills: 0 | Status: SHIPPED | OUTPATIENT
Start: 2022-08-17 | End: 2022-11-21 | Stop reason: SDUPTHER

## 2022-08-17 NOTE — PROGRESS NOTES
CC:    Lung nodules / COPD    HPI:    71 y.o. WF w/ complex PMH:  120 py smoking quit 2010, COPD, NSCLC s/p resection RLL 2000 (presumeably early stage - no chemo/RT), Hodgkins Lymphoma 1980 s/p chest RT and spleenectomy, Ovarian Ca 1975 s/p RT, hypothyroidism, achalasia referred by Dr. Pelletier for lung nodules.  She recently had an esophageal dilation followed by CT chest in Feb that showed achalasia.  In addition the CT scan showed several sub-centimeter scattered nodules.  Patient quit smoking as mentioned above in 2010.  She does report significant ZHONG with minimal activity (showering, dressing, ADL's, walking up steps).  Does ok walking on a flat surface.  No other complaints.    Repeat CT showed a chest wall based cavitary RML lesion.  Radiology recommended CT guided biopsy.  She went for this and the lesion decreased in size so the biopsy was cancelled.   She had a PEG placed for her severe esophageal stenosis and continues to get repeat dilations.    PEG tube has been removed.  Patient has had persistent fistula that was closed endoscopically.  She has gained weight, is tolerating po, and now has a healthier BMI of 24.    Had Echo w/ mod AI and elevated RVSP 35-45.      Last visit had severe back pain.  She ended up having thoracic compression fracture and had kyphoplasty.    INTERVAL HISTORY:    Patient returns for follow up. Breathing worse for about 1 month.  Placed on abx by PCP, not much improvement.  More wheezing and productive cough.  Has trouble getting it out.    Recently diagnosed with hyperthyroidism and started on methimazole.    PMH:    Past Medical History:   Diagnosis Date   • Breast cancer (HCC)    • Cancer (HCC)     lung   • Colon polyp    • COPD (chronic obstructive pulmonary disease) (HCC)    • Disease of thyroid gland    • Dysrhythmia     tachycardia   • Frequent PVCs     bigeminal pvc   • GERD (gastroesophageal reflux disease)    • History of pneumonia    • Hodgkin's disease (HCC)    •  Hypertension    • Lung cancer (HCC)    • Ovarian cancer (HCC)      PSH:    Past Surgical History:   Procedure Laterality Date   • BREAST LUMPECTOMY     • CATARACT EXTRACTION     • CATARACT EXTRACTION W/ INTRAOCULAR LENS IMPLANTW/ TRABECULECTOMY     • COLONOSCOPY      limited due to scar tissue more thatn 10 years ago   • ENDOSCOPY     • ENDOSCOPY N/A 3/19/2020    Procedure: ESOPHAGOGASTRODUODENOSCOPY WITH ARGON PLASMA COAGULATOR;  Surgeon: Felice Mcintosh MD;  Location:  [facility];  Service: Gastroenterology;  Laterality: N/A;   • ENDOSCOPY N/A 2020    Procedure: ESOPHAGOGASTRODUODENOSCOPY;  Surgeon: Felice Mcintosh MD;  Location:  [facility];  Service: Gastroenterology;  Laterality: N/A;  APC was used to remove ovesco clip and another one was placed.    • ENDOSCOPY N/A 2020    Procedure: ESOPHAGOGASTRODUODENOSCOPY;  Surgeon: Felice Mcintosh MD;  Location:  [facility];  Service: Gastroenterology;  Laterality: N/A;  balloon dilation done using 18-20 balloon   • GASTROSTOMY FEEDING TUBE INSERTION  2018    [facility]   • HIP ARTHROSCOPY Right     total hip replacement    • HYSTERECTOMY     • LUNG SURGERY     • RETINAL DETACHMENT REPAIR     • SPLENECTOMY     • THYROIDECTOMY  2012    partial   • UPPER GASTROINTESTINAL ENDOSCOPY  2018 x 3    esophageal strictures by [facility]     FH:    Family History   Problem Relation Age of Onset   • Cancer Mother    • Lung cancer Mother    • Heart disease Father    • Heart disease Brother    • Breast cancer Maternal Grandmother    • Colon polyps Sister    • Colon cancer Neg Hx      SH:    Social History     Socioeconomic History   • Marital status:    Tobacco Use   • Smoking status: Former Smoker     Packs/day: 3.00     Years: 40.00     Pack years: 120.00     Types: Cigarettes     Quit date: 5/10/2010     Years since quittin.2   • Smokeless tobacco: Never Used   Substance and  Sexual Activity   • Alcohol use: Yes     Comment: Socially    • Drug use: Yes     Types: Marijuana     Comment: brownie occas   • Sexual activity: Defer     ALLERGIES:    No Known Allergies  MEDICATIONS:      Current Outpatient Medications:   •  albuterol sulfate  (90 Base) MCG/ACT inhaler, Inhale 2 puffs Every 4 (Four) Hours As Needed for Wheezing or Shortness of Air. Use with spacer, Disp: 54 g, Rfl: 2  •  Brovana 15 MCG/2ML nebulizer solution, 15 mcg 2 (Two) Times a Day., Disp: , Rfl:   •  budesonide (PULMICORT) 0.5 MG/2ML nebulizer solution, Take 0.5 mg by nebulization Daily., Disp: , Rfl:   •  diazePAM (VALIUM) 5 MG tablet, Take 5 mg by mouth Every 8 (Eight) Hours As Needed., Disp: , Rfl:   •  fluticasone (Flonase) 50 MCG/ACT nasal spray, 2 sprays by Each Nare route 2 (Two) Times a Day., Disp: 18.2 mL, Rfl: 11  •  levocetirizine (XYZAL) 5 MG tablet, TAKE 1 TABLET EVERY EVENING, Disp: 90 tablet, Rfl: 3  •  methIMAzole (TAPAZOLE) 10 MG tablet, Take 2 tablets by mouth Daily., Disp: 180 tablet, Rfl: 3  •  metoprolol tartrate (LOPRESSOR) 50 MG tablet, Take 1 tablet by mouth 2 (Two) Times a Day., Disp: , Rfl:   •  montelukast (SINGULAIR) 10 MG tablet, TAKE 1 TABLET AT BEDTIME, Disp: 90 tablet, Rfl: 0  •  omeprazole (priLOSEC) 40 MG capsule, , Disp: , Rfl:   •  predniSONE (DELTASONE) 10 MG tablet, TAKE ONE TABLET BY MOUTH DAILY, Disp: 30 tablet, Rfl: 3  •  tiotropium bromide monohydrate (Spiriva Respimat) 2.5 MCG/ACT aerosol solution inhaler, Inhale 2 puffs Daily., Disp: 1 inhaler, Rfl: 11  •  vitamin C (ASCORBIC ACID) 500 MG tablet, Take 500 mg by mouth 2 (Two) Times a Day., Disp: , Rfl:   •  predniSONE (DELTASONE) 10 MG tablet, Take 4 tabs daily x 4 days, then take 3 daily x 3 days, then take 2 daily for 3 days, then go back to 10 mg daily, Disp: 31 tablet, Rfl: 0  •  traZODone (DESYREL) 100 MG tablet, Take 100 mg by mouth As Needed., Disp: , Rfl:   ROS:  Per HPI    DIAGNOSTIC DATA (Reviewed and interpreted  by me unless otherwise specified):    PFT 5/10/18 - severe obstruction, no change w/ BD, air trapping, normal DLCO    PFT 3/4/20 - very severe obstruction, + air trapping, normal DLCO    PFT 10/15/20 - severe obstruction, no restriction, +air trapping, mildly reduced DLCO    PFT 5/27/21 - severe obstruction, no change w/ BD w/ ATS criteria, no restriction, +air trapping, mild reduction in DLCO corrects for alveolar volume    PFT 8/17/22 - severe obstruction, no restriction, +air trapping, mild reduction in DLCO overcorrects    6MW 10/15/20 - 99% on RA start, min sat 99% on RA, walked 274 meters 62% predicted    CT Chest 2/21/18 - prior RLL lobectomy, radiation changes near mediastinum bilateral upper lobes, emphysema, multiple sub-centimeter nodules, achalasia w/ fluid filled esophagus, prior spleenectomy    CT Chest 2/6/19 - stable from prior, sub-centimeter nodules stable    CT Chest 2/6/20 - stable nodules, other findings stable    CT Chest 3/19/21 - Lung RADS 2    CT Chest 3/14/22 - no change from prior    CXR 4/25/22 - trachea midline, elevated right moise-diaphragm, calcified granulomatous disease, right mid lung linear scar.  No acute appearing pulmonary parenchymal disease.  Thoracic kyphosis.  No change from 11/6/2018.    Vitals:    08/17/22 1048   BP: 122/76   Pulse: 97   Temp: 97.8 °F (36.6 °C)   SpO2: 96%     Physical Exam:    Constitutional: Alert, no Distress  Mouth/Throat: Oropharynx is clear and moist.   Cardiovascular: Normal rate, regular rhythm, normal heart sounds.   Pulmonary/Chest: Effort normal and breath sounds w/ prolonged expiratory phase.  No clubbing.       Assessment & Plan     GOLD 4D COPD   Severe Persistent Asthma Overlap   Acute Exacerbation COPD / Asthma  Non-CF Bronchiectasis    Continue pulmicort neb 0.5 mg bid, continue brovana miles bid.  Markers of allergic inflammation suggestive of significant Asthmatic component.  High risk for pneumonia w/ achalasia and fluid filled esophagus.  " Spiriva Respimat on \"good days\", use Yupelri neb on \"bad days\" (samples of both given).      Patients QOL and breathing are severely impaired.  We have discussed risks and benefits of trying low dose prednisone on prior visits.  Prednisone 10 mg daily started 5/28/21.  She has had dramatic clinical improvement consistent with steroid dependent asthma.  Unfortunately had a thoracic compression fracture, but doing so poorly from respiratory standpoint stopping prednisone not currently an option.    Give steroid burst over 7-10 days then taper back to 10 mg daily.    Request vest therapy for non CF bronchiectasis and frequent mucous production.  Add 7% saline neb bid through DME (3% ok if 7% unavailable).      Check sputum culture in office otday    Would like to start dupixent 600 mg load and 300 mg q14 days.  Will have office look into dupixent again.  Will restart PA process.    Lung Nodules    Sub-centimeter, stable x 2 years.  Given smoking history she still qualifies for lung cancer screening.  Due for next scan 3/2023.    Allergic Rhinitis    flonase, anti-histamine, singulair    Nocturnal Hypoxemia    continue 2 lpm hs    RTC 3 months w/ Hilario and CXR    Gage Collier MD  Pulmonology and Critical Care Medicine  08/17/22 12:30 EDT  Electronically Signed    C.C.:  No ref. provider found, Lucho Peters MD        "

## 2022-08-18 LAB — GIE STN SPEC: NORMAL

## 2022-08-19 DIAGNOSIS — J44.9 CHRONIC OBSTRUCTIVE PULMONARY DISEASE, UNSPECIFIED COPD TYPE: Primary | ICD-10-CM

## 2022-08-19 DIAGNOSIS — J47.9 BRONCHIECTASIS WITHOUT ACUTE EXACERBATION: ICD-10-CM

## 2022-08-19 RX ORDER — SODIUM CHLORIDE FOR INHALATION 7 %
4 VIAL, NEBULIZER (ML) INHALATION 2 TIMES DAILY PRN
Qty: 360 ML | Refills: 3 | Status: SHIPPED | OUTPATIENT
Start: 2022-08-19 | End: 2022-11-21 | Stop reason: SDUPTHER

## 2022-08-20 LAB
BACTERIA SPEC RESP CULT: ABNORMAL
BACTERIA SPEC RESP CULT: ABNORMAL
GRAM STN SPEC: ABNORMAL

## 2022-08-22 RX ORDER — CIPROFLOXACIN 500 MG/1
500 TABLET, FILM COATED ORAL 2 TIMES DAILY
Qty: 20 TABLET | Refills: 0 | Status: SHIPPED | OUTPATIENT
Start: 2022-08-22 | End: 2023-02-21

## 2022-08-30 ENCOUNTER — TELEPHONE (OUTPATIENT)
Dept: PULMONOLOGY | Facility: CLINIC | Age: 72
End: 2022-08-30

## 2022-08-30 DIAGNOSIS — J45.50 SEVERE PERSISTENT ASTHMA WITHOUT COMPLICATION: Primary | ICD-10-CM

## 2022-08-30 NOTE — TELEPHONE ENCOUNTER
Attempted to reach, voice message left regarding Nucala PA approval. Prescription sent to [facility]. [place] [facility] Nucala phone number [phone] given for potential financial  assistance.  Instructed to call office once medication has been received from pharmacy so first injection and teaching appoinemnt can be schedule. Office phone given.       Nucala 100mg PEN SQ every 28 days #1 with 11 refills

## 2022-08-30 NOTE — TELEPHONE ENCOUNTER
Attempted to reach, voice message left regarding acceptance into [place] [facility] for free medication. [place] [facility] phone number [phone] given along with [facility] pharmacy refill line [phone] given so patient will be able to receive Nucala medication. Office phone given for questions or concerns.

## 2022-09-08 ENCOUNTER — CLINICAL SUPPORT (OUTPATIENT)
Dept: PULMONOLOGY | Facility: CLINIC | Age: 72
End: 2022-09-08

## 2022-09-08 DIAGNOSIS — J45.50 SEVERE PERSISTENT ASTHMA WITHOUT COMPLICATION: Primary | ICD-10-CM

## 2022-09-08 PROCEDURE — 96372 THER/PROPH/DIAG INJ SC/IM: CPT | Performed by: INTERNAL MEDICINE

## 2022-09-20 DIAGNOSIS — J44.9 CHRONIC OBSTRUCTIVE PULMONARY DISEASE, UNSPECIFIED COPD TYPE: ICD-10-CM

## 2022-09-20 RX ORDER — MONTELUKAST SODIUM 10 MG/1
10 TABLET ORAL
Qty: 90 TABLET | Refills: 1 | Status: SHIPPED | OUTPATIENT
Start: 2022-09-20 | End: 2023-03-06

## 2022-09-20 NOTE — TELEPHONE ENCOUNTER
Fax refill request for Rx Montelukast 10 mg approved and faxed per chart via fax sent to [facility].

## 2022-09-21 ENCOUNTER — OFFICE VISIT (OUTPATIENT)
Dept: ENDOCRINOLOGY | Facility: CLINIC | Age: 72
End: 2022-09-21

## 2022-09-21 ENCOUNTER — LAB (OUTPATIENT)
Dept: LAB | Facility: HOSPITAL | Age: 72
End: 2022-09-21

## 2022-09-21 VITALS
OXYGEN SATURATION: 98 % | BODY MASS INDEX: 23.22 KG/M2 | DIASTOLIC BLOOD PRESSURE: 84 MMHG | SYSTOLIC BLOOD PRESSURE: 150 MMHG | HEART RATE: 79 BPM | WEIGHT: 136 LBS | HEIGHT: 64 IN

## 2022-09-21 DIAGNOSIS — E05.90 HYPERTHYROIDISM: ICD-10-CM

## 2022-09-21 DIAGNOSIS — E05.90 HYPERTHYROIDISM: Primary | ICD-10-CM

## 2022-09-21 LAB
T4 FREE SERPL-MCNC: <0.1 NG/DL (ref 0.93–1.7)
TSH SERPL DL<=0.05 MIU/L-ACNC: 69.4 UIU/ML (ref 0.27–4.2)

## 2022-09-21 PROCEDURE — 99213 OFFICE O/P EST LOW 20 MIN: CPT | Performed by: INTERNAL MEDICINE

## 2022-09-21 PROCEDURE — 84439 ASSAY OF FREE THYROXINE: CPT

## 2022-09-21 PROCEDURE — 84443 ASSAY THYROID STIM HORMONE: CPT

## 2022-09-21 NOTE — ASSESSMENT & PLAN NOTE
Clinically looks like we will need to reduce her dose of mmi (improved)  Will check levels to confirm

## 2022-09-21 NOTE — PROGRESS NOTES
"     Office Note      Date: 2022  Patient Name: Ita Pickett  MRN: [id number]  : 1950    Chief Complaint   Patient presents with   • Hyperthyroidism       History of Present Illness:   Ita Pickett is a 71 y.o. female who presents for Hyperthyroidism  about 6 weeks ago, I started her on methimazole to treat hyperthyroidism that recurred due to graves disease in her remaining right thyroid lobe (had hx of partial thyroidectomy for graves disease years ago )  She notes fatigue. (she had that last time)  She still has some sweats at night but her feet are cold.  Her heart rate is slower  Insomnia - she feels like it might be better    So not much is better.  No rash or direct side effects from the medication  She has gained weight  She has started nucala for her asthma     Subjective          Review of Systems:   Review of Systems   Constitutional: Positive for fatigue and unexpected weight change.   Cardiovascular: Positive for palpitations.   Psychiatric/Behavioral: Positive for sleep disturbance.       The following portions of the patient's history were reviewed and updated as appropriate: allergies, current medications, past family history, past medical history, past social history, past surgical history and problem list.    Objective     Visit Vitals  /84   Pulse 79   Ht 162.6 cm (64\")   Wt 61.7 kg (136 lb)   LMP  (LMP Unknown)   SpO2 98%   BMI 23.34 kg/m²       Labs:    CBC w/DIFF  Lab Results   Component Value Date    WBC 11.09 (H) 2021    RBC 4.00 2021    HGB 11.8 (L) 2021    HCT 36.5 2021    MCV 91.3 2021    MCH 29.5 2021    MCHC 32.3 2021    RDW 14.2 2021    RDWSD 46.5 2021    MPV 11.5 2021     2021    NEUTRORELPCT 56.1 2021    LYMPHORELPCT 30.7 2021    MONORELPCT 9.7 2021    EOSRELPCT 2.5 2021    BASORELPCT 0.7 2021    AUTOIGPER 0.3 2021    NEUTROABS 6.21 2021    " LYMPHSABS 3.41 (H) 04/19/2021    MONOSABS 1.08 (H) 04/19/2021    EOSABS 0.28 04/19/2021    BASOSABS 0.08 04/19/2021    AUTOIGNUM 0.03 04/19/2021    NRBC 0.1 04/19/2021       T4  No results found for: FREET4    TSH  No results found for: TSHBASE     Physical Exam:  Physical Exam  Vitals reviewed.   Constitutional:       Appearance: Normal appearance.   HENT:      Head: Normocephalic and atraumatic.   Eyes:      Extraocular Movements: Extraocular movements intact.   Neck:      Comments: Right side has gotten much smaller (thyroid)  Lymphadenopathy:      Cervical: No cervical adenopathy.   Neurological:      Mental Status: She is alert.   Psychiatric:         Mood and Affect: Mood normal.         Behavior: Behavior normal.         Thought Content: Thought content normal.         Judgment: Judgment normal.         Assessment / Plan      Assessment & Plan:  Problem List Items Addressed This Visit        Other    Hyperthyroidism - Primary    Overview     RECURRENT FOLLOWING REMOTE PARTIAL THYROIDECTOMY         Current Assessment & Plan     Clinically looks like we will need to reduce her dose of mmi (improved)  Will check levels to confirm          Relevant Medications    metoprolol tartrate (LOPRESSOR) 50 MG tablet    methIMAzole (TAPAZOLE) 10 MG tablet    predniSONE (DELTASONE) 10 MG tablet    predniSONE (DELTASONE) 10 MG tablet    Other Relevant Orders    T4, Free    TSH           Dae Godoy MD   09/21/2022

## 2022-09-22 RX ORDER — METHIMAZOLE 5 MG/1
5 TABLET ORAL DAILY
Qty: 90 TABLET | Refills: 0 | Status: SHIPPED | OUTPATIENT
Start: 2022-09-22 | End: 2022-12-19

## 2022-09-28 ENCOUNTER — OFFICE VISIT (OUTPATIENT)
Dept: GASTROENTEROLOGY | Facility: CLINIC | Age: 72
End: 2022-09-28

## 2022-09-28 VITALS
SYSTOLIC BLOOD PRESSURE: 131 MMHG | WEIGHT: 135 LBS | DIASTOLIC BLOOD PRESSURE: 77 MMHG | TEMPERATURE: 97.7 F | HEART RATE: 92 BPM | BODY MASS INDEX: 23.05 KG/M2 | HEIGHT: 64 IN

## 2022-09-28 DIAGNOSIS — D12.6 ADENOMATOUS POLYP OF COLON, UNSPECIFIED PART OF COLON: Primary | ICD-10-CM

## 2022-09-28 DIAGNOSIS — R13.19 ESOPHAGEAL DYSPHAGIA: ICD-10-CM

## 2022-09-28 DIAGNOSIS — K22.2 BENIGN ESOPHAGEAL STRICTURE: ICD-10-CM

## 2022-09-28 LAB
FUNGUS WND CULT: NORMAL
MYCOBACTERIUM SPEC CULT: NORMAL
NIGHT BLUE STAIN TISS: NORMAL

## 2022-09-28 PROCEDURE — 99214 OFFICE O/P EST MOD 30 MIN: CPT | Performed by: INTERNAL MEDICINE

## 2022-09-28 RX ORDER — LEVOCETIRIZINE DIHYDROCHLORIDE 5 MG/1
TABLET, FILM COATED ORAL
Qty: 90 TABLET | Refills: 3 | Status: SHIPPED | OUTPATIENT
Start: 2022-09-28

## 2022-09-28 NOTE — PROGRESS NOTES
GASTROENTEROLOGY OFFICE NOTE  Ita Pickett  [id number]  1950      Chief Complaint   Patient presents with   • Difficulty Swallowing   • Constipation   • Diarrhea        HISTORY OF PRESENT ILLNESS:  Patient known to me.  She is status post gastric fistula following removal of PEG tube which required various interventions to follow close the fistula.    She is here today for further work-up and evaluation.    She is status post colonoscopy March 2020 at which time multiple large sessile polyps were removed.  12 mm sessile polyp was removed in piecemeal fashion after submucosal saline/spot ink from the sigmoid colon and another 20 mm polyp was removed from the ascending colon.  We had recommended a 3 to 6 months surveillance interval.    She is noting intermittent dysphagia to solids and was found to have a stricture of the esophagus on her prior upper endoscopies.  It was dilated to 20 mm on her May 11, 2020 EGD but she is again having intermittent dysphagia to solids.    She denies odynophagia, early satiety, unexplained weight loss, melena or bright red blood per rectum.    Complicated history includes Hodgkin's lymphoma status post x-ray therapy to chest, neck, splenectomy 1980s, ovarian cancer resulting in total abdominal hysterectomy and radiation therapy 1975 for squamous cell lung cancer.  She had a right lower lobe resection 2000.  She has COPD.  Benign esophageal strictures requiring esophageal dilations.  History of achalasia.  Type II.      PAST MEDICAL HISTORY  Past Medical History:   Diagnosis Date   • Breast cancer (HCC)    • Cancer (HCC)     lung   • Colon polyp    • COPD (chronic obstructive pulmonary disease) (HCC)    • Disease of thyroid gland    • Dysrhythmia     tachycardia   • Frequent PVCs     bigeminal pvc   • GERD (gastroesophageal reflux disease)    • Graves disease    • History of pneumonia    • Hodgkin's disease (HCC)    • Hypertension    • Lung cancer (HCC)    • Ovarian cancer  (HCC)         PAST SURGICAL HISTORY  Past Surgical History:   Procedure Laterality Date   • BACK SURGERY     • BREAST LUMPECTOMY     • CATARACT EXTRACTION     • CATARACT EXTRACTION W/ INTRAOCULAR LENS IMPLANTW/ TRABECULECTOMY     • COLONOSCOPY      limited due to scar tissue more thatn 10 years ago   • ENDOSCOPY     • ENDOSCOPY N/A 03/19/2020    Procedure: ESOPHAGOGASTRODUODENOSCOPY WITH ARGON PLASMA COAGULATOR;  Surgeon: Felice Mcintosh MD;  Location:  [facility];  Service: Gastroenterology;  Laterality: N/A;   • ENDOSCOPY N/A 04/07/2020    Procedure: ESOPHAGOGASTRODUODENOSCOPY;  Surgeon: Felice Mcintosh MD;  Location:  [facility];  Service: Gastroenterology;  Laterality: N/A;  APC was used to remove ovesco clip and another one was placed.    • ENDOSCOPY N/A 05/11/2020    Procedure: ESOPHAGOGASTRODUODENOSCOPY;  Surgeon: Felice Mcintosh MD;  Location:  [facility];  Service: Gastroenterology;  Laterality: N/A;  balloon dilation done using 18-20 balloon   • GASTROSTOMY FEEDING TUBE INSERTION  2018    [facility]   • HIP ARTHROSCOPY Right 2011    total hip replacement    • HYSTERECTOMY     • LUNG SURGERY  2000   • RETINAL DETACHMENT REPAIR  2015   • SPLENECTOMY  1980   • THYROIDECTOMY  2012    partial   • UPPER GASTROINTESTINAL ENDOSCOPY  2018 x 3    esophageal strictures by [facility]        MEDICATIONS:    Current Outpatient Medications:   •  albuterol sulfate  (90 Base) MCG/ACT inhaler, Inhale 2 puffs Every 4 (Four) Hours As Needed for Wheezing or Shortness of Air. Use with spacer, Disp: 54 g, Rfl: 2  •  Brovana 15 MCG/2ML nebulizer solution, 15 mcg 2 (Two) Times a Day., Disp: , Rfl:   •  budesonide (PULMICORT) 0.5 MG/2ML nebulizer solution, Take 0.5 mg by nebulization Daily., Disp: , Rfl:   •  diazePAM (VALIUM) 5 MG tablet, Take 5 mg by mouth Every 8 (Eight) Hours As Needed., Disp: , Rfl:   •  fluticasone (Flonase) 50 MCG/ACT nasal spray, 2  sprays by Each Nare route 2 (Two) Times a Day., Disp: 18.2 mL, Rfl: 11  •  levocetirizine (XYZAL) 5 MG tablet, TAKE 1 TABLET EVERY EVENING, Disp: 90 tablet, Rfl: 3  •  Mepolizumab 100 MG/ML solution auto-injector, Inject 1 mL under the skin into the appropriate area as directed Every 28 (Twenty-Eight) Days., Disp: 1 mL, Rfl: 11  •  methIMAzole (TAPAZOLE) 5 MG tablet, Take 1 tablet by mouth Daily., Disp: 90 tablet, Rfl: 0  •  metoprolol tartrate (LOPRESSOR) 50 MG tablet, Take 1 tablet by mouth 2 (Two) Times a Day., Disp: , Rfl:   •  montelukast (SINGULAIR) 10 MG tablet, Take 1 tablet by mouth every night at bedtime., Disp: 90 tablet, Rfl: 1  •  O2 (OXYGEN), Inhale 2 L/min Every Night., Disp: , Rfl:   •  omeprazole (priLOSEC) 40 MG capsule, , Disp: , Rfl:   •  predniSONE (DELTASONE) 10 MG tablet, TAKE ONE TABLET BY MOUTH DAILY, Disp: 30 tablet, Rfl: 3  •  sodium chloride 7 % nebulizer solution nebulizer solution, Take 4 mL by nebulization 2 (Two) Times a Day As Needed (Congestion)., Disp: 360 mL, Rfl: 3  •  tiotropium bromide monohydrate (Spiriva Respimat) 2.5 MCG/ACT aerosol solution inhaler, Inhale 2 puffs Daily., Disp: 1 inhaler, Rfl: 11  •  vitamin C (ASCORBIC ACID) 500 MG tablet, Take 500 mg by mouth 2 (Two) Times a Day., Disp: , Rfl:   •  ciprofloxacin (CIPRO) 500 MG tablet, Take 1 tablet by mouth 2 (Two) Times a Day., Disp: 20 tablet, Rfl: 0  •  predniSONE (DELTASONE) 10 MG tablet, Take 4 tabs daily x 4 days, then take 3 daily x 3 days, then take 2 daily for 3 days, then go back to 10 mg daily, Disp: 31 tablet, Rfl: 0  •  traZODone (DESYREL) 100 MG tablet, Take 100 mg by mouth As Needed., Disp: , Rfl:     ALLERGIES  No Known Allergies    FAMILY HISTORY:  Family History   Problem Relation Age of Onset   • Cancer Mother    • Lung cancer Mother    • Heart disease Father    • Heart disease Brother    • Breast cancer Maternal Grandmother    • Colon polyps Sister    • Colon cancer Neg Hx        SOCIAL HISTORY  Social  "History     Socioeconomic History   • Marital status:    Tobacco Use   • Smoking status: Former Smoker     Packs/day: 3.00     Years: 40.00     Pack years: 120.00     Types: Cigarettes     Quit date: 5/10/2010     Years since quittin.3   • Smokeless tobacco: Never Used   Vaping Use   • Vaping Use: Never used   Substance and Sexual Activity   • Alcohol use: Yes     Comment: Socially    • Drug use: Yes     Types: Marijuana     Comment: brownie occas   • Sexual activity: Defer     Socioeconomic History: She is a  and does not have any children she is a non-smoker for many years now and drinks alcoholic beverages rarely in social moderation.  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         PHYSICAL EXAM   /77 (BP Location: Left arm, Patient Position: Sitting, Cuff Size: Adult)   Pulse 92   Temp 97.7 °F (36.5 °C) (Temporal)   Ht 162.6 cm (64\")   Wt 61.2 kg (135 lb)   LMP  (LMP Unknown)   BMI 23.17 kg/m²   General: Alert and oriented x 3. In no apparent or acute distress.  and No stigmata of chronic liver disease  HEENT: Anicteric sclerae. Normal oropharynx  Neck: Supple. Without lymphadenopathy  CV: Regular rate and rhythm, S1, S2  Lungs: Clear to ausculation. Without rales, rhonchi and wheezing  Abdomen:  Soft,non-distended without palpable masses or hepatosplenomeagaly, areas of rebound tenderness or guarding.   Extremeties: without clubbing, cyanosis or edema  Neurologic:  Alert and oriented x 3 without focal motor or sensory deficits  Rectal exam: deferred       ASSESSMENT  1.-  Patient with multiple large sessile adenomatous polyps removed in piecemeal fashion.  She is overdue for surveillance and is amenable to returning at a later date for repeat colonoscopy for surveillance purposes  2.-  Known esophageal stricture.  Again symptomatic.  Esophageal dilation recommended.  We have been able to dilate her to 20 mm  3.-  History of post PEG removal of " gastric fistula requiring multiple endoscopic interventions for ultimate closure  4.    History of lung cancer.  Squamous cell  5.-  History of ovarian cancer  6.-  History of lymphoma status post XRT and splenectomy    PLAN  1.-  Schedule colonoscopy for surveillance purposes  2.-  Schedule EGD for esophageal dilation  3.-  Further work-up/recommendations pending findings of the above-noted procedures    Felice Mcintosh MD  9/28/2022   13:43 EDT

## 2022-11-21 ENCOUNTER — OFFICE VISIT (OUTPATIENT)
Dept: PULMONOLOGY | Facility: CLINIC | Age: 72
End: 2022-11-21

## 2022-11-21 VITALS
DIASTOLIC BLOOD PRESSURE: 80 MMHG | BODY MASS INDEX: 23.9 KG/M2 | SYSTOLIC BLOOD PRESSURE: 150 MMHG | TEMPERATURE: 98.4 F | OXYGEN SATURATION: 96 % | HEIGHT: 64 IN | WEIGHT: 140 LBS | HEART RATE: 96 BPM

## 2022-11-21 DIAGNOSIS — R06.00 DYSPNEA, UNSPECIFIED TYPE: ICD-10-CM

## 2022-11-21 DIAGNOSIS — J47.9 BRONCHIECTASIS WITHOUT ACUTE EXACERBATION: ICD-10-CM

## 2022-11-21 DIAGNOSIS — J44.9 CHRONIC OBSTRUCTIVE PULMONARY DISEASE, UNSPECIFIED COPD TYPE: Primary | ICD-10-CM

## 2022-11-21 PROCEDURE — 94010 BREATHING CAPACITY TEST: CPT | Performed by: NURSE PRACTITIONER

## 2022-11-21 PROCEDURE — 99214 OFFICE O/P EST MOD 30 MIN: CPT | Performed by: NURSE PRACTITIONER

## 2022-11-21 RX ORDER — SODIUM CHLORIDE FOR INHALATION 7 %
4 VIAL, NEBULIZER (ML) INHALATION 2 TIMES DAILY PRN
Qty: 360 ML | Refills: 3 | Status: SHIPPED | OUTPATIENT
Start: 2022-11-21

## 2022-11-21 RX ORDER — MULTIPLE VITAMINS W/ MINERALS TAB 9MG-400MCG
1 TAB ORAL DAILY
COMMUNITY

## 2022-11-22 NOTE — PROGRESS NOTES
[facility] Pulmonary Follow up    CHIEF COMPLAINT    dyspnea    HISTORY OF PRESENT ILLNESS    Ita Pickett is a 71 y.o.female here today for follow-up.  She was last seen in the office by Dr. Collier in August.  She denies any respiratory illnesses since her last appointment.  She has a history of NSCLC s/p resection right lower lobe 2000, Hodgkin's lymphoma 1980s s/p chest RT and splenectomy, ovarian cancer 1975 s/p RT, hypothyroidism, achalasia and was referred to our office for pulmonary nodules.  She was scheduled to have a CT-guided biopsy for a chest wall based cavitary right middle lobe lesion but the lesion was decreased in size so the biopsy was canceled.    She continues to have some mild shortness of breath with exertion but does recover fairly quickly at rest.  She continues to use her Brovana and Pulmicort nebulizers twice a day and the saline nebulizers twice a day as well.  She does need refills of the saline nebulizers.  She also uses Spiriva 2.5 daily.  She is also doing well taking 10 mg of prednisone a day.    She has a history of having a PEG tube due to her severe esophageal stenosis.  She had that PEG removed and has had dilations and states that her swallowing has improved.  She denies any difficulty with eating or drinking.    Overall she feels that her breathing has remained the same since her last appointment.  She denies any sputum production or hemoptysis.  She denies any chest pain or palpitations.  She denies any lower extremity edema or calf tenderness.    She is up-to-date on her current vaccinations.    Patient Active Problem List   Diagnosis   • COPD (chronic obstructive pulmonary disease) (HCC)   • Achalasia   • History of lung cancer   • Breast cancer (HCC)   • History of esophageal stricture   • Esophageal dysphagia   • History of radiation therapy   • Pain around PEG tube site   • Hodgkin lymphoma (HCC)   • Dyspnea   • SVT (supraventricular tachycardia) (HCC)   • Pericardial  effusion   • Acquired gastric fistula   • Gastrocutaneous fistula   • Iron deficiency anemia   • Thrombocytosis   • Aspiration into airway   • Adenomatous polyp of colon   • Hyperthyroidism   • Benign esophageal stricture       No Known Allergies    Current Outpatient Medications:   •  albuterol sulfate  (90 Base) MCG/ACT inhaler, Inhale 2 puffs Every 4 (Four) Hours As Needed for Wheezing or Shortness of Air. Use with spacer, Disp: 54 g, Rfl: 2  •  Brovana 15 MCG/2ML nebulizer solution, 15 mcg 2 (Two) Times a Day., Disp: , Rfl:   •  budesonide (PULMICORT) 0.5 MG/2ML nebulizer solution, Take 0.5 mg by nebulization Daily., Disp: , Rfl:   •  diazePAM (VALIUM) 5 MG tablet, Take 5 mg by mouth Every 8 (Eight) Hours As Needed., Disp: , Rfl:   •  levocetirizine (XYZAL) 5 MG tablet, TAKE 1 TABLET EVERY EVENING, Disp: 90 tablet, Rfl: 3  •  Mepolizumab 100 MG/ML solution auto-injector, Inject 1 mL under the skin into the appropriate area as directed Every 28 (Twenty-Eight) Days., Disp: 1 mL, Rfl: 11  •  metoprolol tartrate (LOPRESSOR) 50 MG tablet, Take 1 tablet by mouth 2 (Two) Times a Day., Disp: , Rfl:   •  montelukast (SINGULAIR) 10 MG tablet, Take 1 tablet by mouth every night at bedtime., Disp: 90 tablet, Rfl: 1  •  multivitamin with minerals tablet tablet, Take 1 tablet by mouth Daily., Disp: , Rfl:   •  O2 (OXYGEN), Inhale 2 L/min Every Night., Disp: , Rfl:   •  omeprazole (priLOSEC) 40 MG capsule, , Disp: , Rfl:   •  predniSONE (DELTASONE) 10 MG tablet, TAKE ONE TABLET BY MOUTH DAILY, Disp: 30 tablet, Rfl: 3  •  sodium chloride 7 % nebulizer solution nebulizer solution, Take 4 mL by nebulization 2 (Two) Times a Day As Needed (Congestion)., Disp: 360 mL, Rfl: 3  •  tiotropium bromide monohydrate (Spiriva Respimat) 2.5 MCG/ACT aerosol solution inhaler, Inhale 2 puffs Daily., Disp: 1 inhaler, Rfl: 11  •  vitamin D3 125 MCG (5000 UT) capsule capsule, Take 5,000 Units by mouth Daily., Disp: , Rfl:   •  ciprofloxacin  (CIPRO) 500 MG tablet, Take 1 tablet by mouth 2 (Two) Times a Day., Disp: 20 tablet, Rfl: 0  •  fluticasone (Flonase) 50 MCG/ACT nasal spray, 2 sprays by Each Nare route 2 (Two) Times a Day., Disp: 18.2 mL, Rfl: 11  •  methIMAzole (TAPAZOLE) 5 MG tablet, Take 1 tablet by mouth Daily., Disp: 90 tablet, Rfl: 0  •  traZODone (DESYREL) 100 MG tablet, Take 100 mg by mouth As Needed., Disp: , Rfl:   •  vitamin C (ASCORBIC ACID) 500 MG tablet, Take 500 mg by mouth 2 (Two) Times a Day., Disp: , Rfl:   MEDICATION LIST AND ALLERGIES REVIEWED.    Social History     Tobacco Use   • Smoking status: Former     Packs/day: 3.00     Years: 40.00     Pack years: 120.00     Types: Cigarettes     Quit date: 5/10/2010     Years since quittin.5   • Smokeless tobacco: Never   Vaping Use   • Vaping Use: Never used   Substance Use Topics   • Alcohol use: Yes     Comment: Socially    • Drug use: Yes     Types: Marijuana     Comment: brownie occas       FAMILY AND SOCIAL HISTORY REVIEWED.    Review of Systems   Constitutional: Negative for activity change, appetite change, fatigue, fever and unexpected weight change.   HENT: Negative for congestion, postnasal drip, rhinorrhea, sinus pressure, sore throat and voice change.    Eyes: Negative for visual disturbance.   Respiratory: Positive for shortness of breath. Negative for cough, chest tightness and wheezing.    Cardiovascular: Negative for chest pain, palpitations and leg swelling.   Gastrointestinal: Negative for abdominal distention, abdominal pain, nausea and vomiting.   Endocrine: Negative for cold intolerance and heat intolerance.   Genitourinary: Negative for difficulty urinating and urgency.   Musculoskeletal: Negative for arthralgias, back pain and neck pain.   Skin: Negative for color change and pallor.   Allergic/Immunologic: Negative for environmental allergies and food allergies.   Neurological: Negative for dizziness, syncope, weakness and light-headedness.  "  Hematological: Negative for adenopathy. Does not bruise/bleed easily.   Psychiatric/Behavioral: Negative for agitation and behavioral problems.   .    /80 (BP Location: Right arm, Patient Position: Sitting, Cuff Size: Adult)   Pulse 96   Temp 98.4 °F (36.9 °C) (Infrared)   Ht 162.6 cm (64.02\")   Wt 63.5 kg (140 lb)   LMP  (LMP Unknown)   SpO2 96% Comment: 2 Liters O2 at bedtime  BMI 24.02 kg/m²     Immunization History   Administered Date(s) Administered   • COVID-19 (PFIZER) BIVALENT BOOSTER 12+YRS 09/20/2022   • COVID-19 (PFIZER) PURPLE CAP 03/02/2021, 03/23/2021, 10/13/2021   • COVID-19 (UNSPECIFIED) 10/13/2021   • FLUAD TRI 65YR+ 10/01/2020   • Fluzone High Dose =>65 Years ([employer] ONLY) 09/16/2016, 10/11/2017, 10/23/2018   • Fluzone High-Dose 65+yrs 10/07/2022   • Influenza, Unspecified 12/01/2017   • Pneumococcal Conjugate 13-Valent (PCV13) 09/29/2017   • Pneumococcal Polysaccharide (PPSV23) 12/01/2017       Physical Exam  Vitals and nursing note reviewed.   Constitutional:       Appearance: She is well-developed. She is not diaphoretic.   HENT:      Head: Normocephalic and atraumatic.   Eyes:      Pupils: Pupils are equal, round, and reactive to light.   Neck:      Thyroid: No thyromegaly.   Cardiovascular:      Rate and Rhythm: Normal rate and regular rhythm.      Heart sounds: Normal heart sounds. No murmur heard.    No friction rub. No gallop.   Pulmonary:      Effort: Pulmonary effort is normal. No respiratory distress.      Breath sounds: Normal breath sounds. No wheezing or rales.   Chest:      Chest wall: No tenderness.   Abdominal:      General: Bowel sounds are normal.      Palpations: Abdomen is soft.      Tenderness: There is no abdominal tenderness.   Musculoskeletal:         General: No swelling. Normal range of motion.      Cervical back: Normal range of motion and neck supple.   Lymphadenopathy:      Cervical: No cervical adenopathy.   Skin:     General: Skin is warm and dry.     "  Capillary Refill: Capillary refill takes less than 2 seconds.   Neurological:      Mental Status: She is alert and oriented to person, place, and time.   Psychiatric:         Mood and Affect: Mood normal.         Behavior: Behavior normal.           RESULTS    PFTS in the office today, read by me,  FVC 1.97 66% predicted, FEV1 0.94 41% predicted, FEV1/FVC 48% predicted, severe obstruction, cannot rule out restriction.    XR Chest PA & Lateral    Result Date: 11/21/2022  No acute cardiopulmonary abnormality.  This report was finalized on 11/21/2022 4:27 PM by David Long MD.      PROBLEM LIST    Problem List Items Addressed This Visit        Pulmonary and Pneumonias    COPD (chronic obstructive pulmonary disease) (HCC) - Primary    Overview     Overview:   History: COPD.  PTA was on Stiolto inhalers  Assessment:   98% on room air  Plan:    - monitor sats  - Spiriva while inhouse  - cough and deep breath, Vibrapep  .         Relevant Medications    sodium chloride 7 % nebulizer solution nebulizer solution       Symptoms and Signs    Dyspnea   Other Visit Diagnoses     Bronchiectasis without acute exacerbation ([facility])        Relevant Medications    sodium chloride 7 % nebulizer solution nebulizer solution            DISCUSSION    Ms. Pickett was here for follow-up.  She seems to be doing fairly well her nebulizer regimen.  She will remain on Pulmicort, Brovana and saline nebulizers twice a day.  She will also continue using Spiriva daily.  I did provide her with some samples in the office today.    She unfortunately did not receive a percussion vest and we would like to get this arranged for her near future.  She has difficulty clearing secretions, despite using Acapella and a percussion vest is medically needed for her history of bronchiectasis.  Hopefully we can get her set up with 1 in the next couple of weeks.    Did encourage her to be as active as possible to help with her dyspnea.  She will remain on 10 mg of  prednisone daily.    She will follow-up in 3 months with PFTs or sooner if her symptoms worsen.  Advised her to call with any additional concerns or questions.  I personally spent a total of 33 minutes on patient visit today including chart review, face to face with the patient obtaining the history and physical exam, review of pertinent images and tests, counseling and discussion and/or coordination of care as described above, and documentation.  Total time excludes time spent on other separate services such as performing procedures or test interpretation, if applicable.        Glory Alejandro, APRN  11/21/202212:37 EST  Electronically signed     Please note that portions of this note were completed with a voice recognition program.        CC: Lucho Peters MD

## 2022-11-29 ENCOUNTER — OUTSIDE FACILITY SERVICE (OUTPATIENT)
Dept: GASTROENTEROLOGY | Facility: CLINIC | Age: 72
End: 2022-11-29

## 2022-11-29 PROCEDURE — 43249 ESOPH EGD DILATION <30 MM: CPT | Performed by: INTERNAL MEDICINE

## 2022-11-29 PROCEDURE — 43239 EGD BIOPSY SINGLE/MULTIPLE: CPT | Performed by: INTERNAL MEDICINE

## 2022-11-29 PROCEDURE — 45385 COLONOSCOPY W/LESION REMOVAL: CPT | Performed by: INTERNAL MEDICINE

## 2022-11-29 PROCEDURE — 88305 TISSUE EXAM BY PATHOLOGIST: CPT

## 2022-11-30 ENCOUNTER — LAB REQUISITION (OUTPATIENT)
Dept: LAB | Facility: HOSPITAL | Age: 72
End: 2022-11-30
Payer: MEDICARE

## 2022-11-30 DIAGNOSIS — R13.10 DYSPHAGIA, UNSPECIFIED: ICD-10-CM

## 2022-11-30 DIAGNOSIS — K57.30 DIVERTICULOSIS OF LARGE INTESTINE WITHOUT PERFORATION OR ABSCESS WITHOUT BLEEDING: ICD-10-CM

## 2022-11-30 DIAGNOSIS — D12.5 BENIGN NEOPLASM OF SIGMOID COLON: ICD-10-CM

## 2022-11-30 DIAGNOSIS — K22.2 ESOPHAGEAL OBSTRUCTION: ICD-10-CM

## 2022-11-30 DIAGNOSIS — K31.89 OTHER DISEASES OF STOMACH AND DUODENUM: ICD-10-CM

## 2022-11-30 DIAGNOSIS — D12.0 BENIGN NEOPLASM OF CECUM: ICD-10-CM

## 2022-11-30 DIAGNOSIS — Z83.71 FAMILY HISTORY OF COLONIC POLYPS: ICD-10-CM

## 2022-11-30 DIAGNOSIS — D12.2 BENIGN NEOPLASM OF ASCENDING COLON: ICD-10-CM

## 2022-11-30 DIAGNOSIS — D12.3 BENIGN NEOPLASM OF TRANSVERSE COLON: ICD-10-CM

## 2022-11-30 DIAGNOSIS — K44.9 DIAPHRAGMATIC HERNIA WITHOUT OBSTRUCTION OR GANGRENE: ICD-10-CM

## 2022-11-30 DIAGNOSIS — Z86.010 PERSONAL HISTORY OF COLONIC POLYPS: ICD-10-CM

## 2022-12-02 LAB — REF LAB TEST METHOD: NORMAL

## 2022-12-08 RX ORDER — SODIUM, POTASSIUM,MAG SULFATES 17.5-3.13G
SOLUTION, RECONSTITUTED, ORAL ORAL
Qty: 354 ML | Refills: 0 | Status: SHIPPED | OUTPATIENT
Start: 2022-12-08

## 2022-12-19 RX ORDER — METHIMAZOLE 5 MG/1
5 TABLET ORAL DAILY
Qty: 30 TABLET | Refills: 0 | Status: SHIPPED | OUTPATIENT
Start: 2022-12-19 | End: 2023-01-18 | Stop reason: SDUPTHER

## 2022-12-28 RX ORDER — PREDNISONE 10 MG/1
TABLET ORAL
Qty: 90 TABLET | Refills: 0 | Status: SHIPPED | OUTPATIENT
Start: 2022-12-28 | End: 2023-04-03

## 2023-01-05 ENCOUNTER — OFFICE VISIT (OUTPATIENT)
Dept: ENDOCRINOLOGY | Facility: CLINIC | Age: 73
End: 2023-01-05
Payer: MEDICARE

## 2023-01-05 VITALS
DIASTOLIC BLOOD PRESSURE: 80 MMHG | SYSTOLIC BLOOD PRESSURE: 130 MMHG | OXYGEN SATURATION: 95 % | HEIGHT: 64 IN | WEIGHT: 144 LBS | HEART RATE: 100 BPM | BODY MASS INDEX: 24.59 KG/M2

## 2023-01-05 DIAGNOSIS — E05.90 HYPERTHYROIDISM: ICD-10-CM

## 2023-01-05 PROCEDURE — 99213 OFFICE O/P EST LOW 20 MIN: CPT | Performed by: INTERNAL MEDICINE

## 2023-01-05 PROCEDURE — 36415 COLL VENOUS BLD VENIPUNCTURE: CPT | Performed by: INTERNAL MEDICINE

## 2023-01-05 PROCEDURE — 84443 ASSAY THYROID STIM HORMONE: CPT | Performed by: INTERNAL MEDICINE

## 2023-01-05 PROCEDURE — 84439 ASSAY OF FREE THYROXINE: CPT | Performed by: INTERNAL MEDICINE

## 2023-01-05 NOTE — PROGRESS NOTES
Office Note      Date: 2023  Patient Name: Ita Pickett  MRN: [id number]  : 1950    Chief Complaint   Patient presents with   • Hyperthyroidism       History of Present Illness:   Ita Pickett is a 72 y.o. female who presents for Hyperthyroidism  .   Current rx:methimazole 5 mg /d  On higher doses she got very hypothyroid and we had to stop the medication for a month and then resume at  Lower dose     Changes in history:still on steroids for breathing . Went to ER because she was sick with respiratory issues   Questions /problems:weight gain     Subjective          Review of Systems:   Review of Systems   Constitutional: Positive for fatigue and unexpected weight change.   Respiratory: Negative for wheezing.    Endocrine: Negative for cold intolerance.   Neurological: Negative for tremors.   Psychiatric/Behavioral: Negative for sleep disturbance.       The following portions of the patient's history were reviewed and updated as appropriate: allergies, current medications, past family history, past medical history, past social history, past surgical history and problem list.    Objective     Visit Vitals  /80   Pulse 100   Ht 162.6 cm (64\")   Wt 65.3 kg (144 lb)   LMP  (LMP Unknown)   SpO2 95%   BMI 24.72 kg/m²           Physical Exam:  Physical Exam  Vitals reviewed.   Constitutional:       Appearance: Normal appearance.   HENT:      Head: Normocephalic and atraumatic.   Eyes:      Extraocular Movements: Extraocular movements intact.   Lymphadenopathy:      Cervical: No cervical adenopathy.   Neurological:      Mental Status: She is alert.   Psychiatric:         Mood and Affect: Mood normal.         Thought Content: Thought content normal.         Judgment: Judgment normal.         Assessment / Plan      Assessment & Plan:  Problem List Items Addressed This Visit        Other    Hyperthyroidism    Overview     RECURRENT FOLLOWING REMOTE PARTIAL THYROIDECTOMY         Current  Assessment & Plan     Has a mixture of  Symptoms. I think she is hyperthryoid.  Overall stable.          Relevant Medications    metoprolol tartrate (LOPRESSOR) 50 MG tablet    methIMAzole (TAPAZOLE) 5 MG tablet    predniSONE (DELTASONE) 10 MG tablet    Other Relevant Orders    TSH    T4, Free        Dae Godoy MD   01/05/2023

## 2023-01-06 DIAGNOSIS — E05.90 HYPERTHYROIDISM: Primary | ICD-10-CM

## 2023-01-06 LAB
T4 FREE SERPL-MCNC: 0.51 NG/DL (ref 0.93–1.7)
TSH SERPL DL<=0.05 MIU/L-ACNC: 27.6 UIU/ML (ref 0.27–4.2)

## 2023-01-13 RX ORDER — SODIUM, POTASSIUM,MAG SULFATES 17.5-3.13G
SOLUTION, RECONSTITUTED, ORAL ORAL
Qty: 354 ML | Refills: 0 | Status: SHIPPED | OUTPATIENT
Start: 2023-01-13

## 2023-01-16 RX ORDER — METHIMAZOLE 5 MG/1
TABLET ORAL
Qty: 30 TABLET | Refills: 0 | OUTPATIENT
Start: 2023-01-16

## 2023-01-18 RX ORDER — METHIMAZOLE 5 MG/1
5 TABLET ORAL DAILY
Qty: 30 TABLET | Refills: 5 | Status: SHIPPED | OUTPATIENT
Start: 2023-01-18 | End: 2023-04-05

## 2023-01-24 ENCOUNTER — OUTSIDE FACILITY SERVICE (OUTPATIENT)
Dept: GASTROENTEROLOGY | Facility: CLINIC | Age: 73
End: 2023-01-24
Payer: MEDICARE

## 2023-01-24 PROCEDURE — 45381 COLONOSCOPY SUBMUCOUS NJX: CPT | Performed by: INTERNAL MEDICINE

## 2023-01-24 PROCEDURE — 45385 COLONOSCOPY W/LESION REMOVAL: CPT | Performed by: INTERNAL MEDICINE

## 2023-01-27 RX ORDER — HYDROCORTISONE ACETATE 25 MG/1
25 SUPPOSITORY RECTAL DAILY
Qty: 14 SUPPOSITORY | Refills: 1 | Status: SHIPPED | OUTPATIENT
Start: 2023-01-27

## 2023-02-21 ENCOUNTER — OFFICE VISIT (OUTPATIENT)
Dept: PULMONOLOGY | Facility: CLINIC | Age: 73
End: 2023-02-21
Payer: MEDICARE

## 2023-02-21 VITALS
TEMPERATURE: 98 F | DIASTOLIC BLOOD PRESSURE: 70 MMHG | HEIGHT: 64 IN | WEIGHT: 135 LBS | BODY MASS INDEX: 23.05 KG/M2 | OXYGEN SATURATION: 95 % | HEART RATE: 110 BPM | SYSTOLIC BLOOD PRESSURE: 118 MMHG

## 2023-02-21 DIAGNOSIS — R06.00 DYSPNEA, UNSPECIFIED TYPE: ICD-10-CM

## 2023-02-21 DIAGNOSIS — J44.9 CHRONIC OBSTRUCTIVE PULMONARY DISEASE, UNSPECIFIED COPD TYPE: Primary | ICD-10-CM

## 2023-02-21 PROCEDURE — 99214 OFFICE O/P EST MOD 30 MIN: CPT | Performed by: NURSE PRACTITIONER

## 2023-02-21 PROCEDURE — 94726 PLETHYSMOGRAPHY LUNG VOLUMES: CPT | Performed by: NURSE PRACTITIONER

## 2023-02-21 PROCEDURE — 94729 DIFFUSING CAPACITY: CPT | Performed by: NURSE PRACTITIONER

## 2023-02-21 PROCEDURE — 94375 RESPIRATORY FLOW VOLUME LOOP: CPT | Performed by: NURSE PRACTITIONER

## 2023-02-21 NOTE — PROGRESS NOTES
[facility] Pulmonary Follow up    CHIEF COMPLAINT    Shortness of breath    HISTORY OF PRESENT ILLNESS    Ita Pickett is a 72 y.o.female here today for follow-up.  She was last seen in the office by me in November.  She denies any respiratory illnesses since her last appointment.  She has been started on her Nucala since her last appointment.  She is on her fourth injection.  She has not noticed a significant improvement in her breathing since using this.    She continues to use her Brovana and budesonide nebulizers twice a day.  She also uses her DuoNebs as needed.  She continues to take 10 mg prednisone daily.  She is concerned about staying on this long-term.    She denies any sputum production or hemoptysis.  She denies any fever, chills or night sweats.  She denies any chest pain or palpitations.  She denies any lower extremity edema or calf tenderness.    She continues to gain some weight.  She is not having any difficulties eating at this time.  She has had multiple esophageal dilations in the past.    She quit smoking in 2010 has a history of NSCLC s/p resection RLL 2000, no chemo/RT, Hodgkin's lymphoma 1980 s/p chest RT and splenectomy, ovarian cancer 1975 s/p RT.  She also has a history of achalasia and follows closely with Dr. Pelletier.      She continues to wear her oxygen at 2 L nasal cannula at nighttime.  She denies any sleeping difficulties.    Patient Active Problem List   Diagnosis   • COPD (chronic obstructive pulmonary disease) (HCC)   • Achalasia   • History of lung cancer   • Breast cancer (HCC)   • History of esophageal stricture   • Esophageal dysphagia   • History of radiation therapy   • Pain around PEG tube site   • Hodgkin lymphoma (HCC)   • Dyspnea   • SVT (supraventricular tachycardia) (HCC)   • Pericardial effusion   • Acquired gastric fistula   • Gastrocutaneous fistula   • Iron deficiency anemia   • Thrombocytosis   • Aspiration into airway   • Adenomatous polyp of colon   •  Hyperthyroidism   • Benign esophageal stricture       No Known Allergies    Current Outpatient Medications:   •  albuterol sulfate  (90 Base) MCG/ACT inhaler, Inhale 2 puffs Every 4 (Four) Hours As Needed for Wheezing or Shortness of Air. Use with spacer, Disp: 54 g, Rfl: 2  •  Brovana 15 MCG/2ML nebulizer solution, 15 mcg 2 (Two) Times a Day., Disp: , Rfl:   •  budesonide (PULMICORT) 0.5 MG/2ML nebulizer solution, Take 0.5 mg by nebulization Daily., Disp: , Rfl:   •  diazePAM (VALIUM) 5 MG tablet, Take 5 mg by mouth Every 8 (Eight) Hours As Needed., Disp: , Rfl:   •  fluticasone (Flonase) 50 MCG/ACT nasal spray, 2 sprays by Each Nare route 2 (Two) Times a Day., Disp: 18.2 mL, Rfl: 11  •  hydrocortisone (ANUSOL-HC) 25 MG suppository, Insert 1 suppository into the rectum Daily., Disp: 14 suppository, Rfl: 1  •  levocetirizine (XYZAL) 5 MG tablet, TAKE 1 TABLET EVERY EVENING, Disp: 90 tablet, Rfl: 3  •  Mepolizumab 100 MG/ML solution auto-injector, Inject 1 mL under the skin into the appropriate area as directed Every 28 (Twenty-Eight) Days., Disp: 1 mL, Rfl: 11  •  methIMAzole (TAPAZOLE) 5 MG tablet, Take 1 tablet by mouth Daily., Disp: 30 tablet, Rfl: 5  •  metoprolol tartrate (LOPRESSOR) 50 MG tablet, Take 1 tablet by mouth 2 (Two) Times a Day., Disp: , Rfl:   •  montelukast (SINGULAIR) 10 MG tablet, Take 1 tablet by mouth every night at bedtime., Disp: 90 tablet, Rfl: 1  •  multivitamin with minerals tablet tablet, Take 1 tablet by mouth Daily., Disp: , Rfl:   •  O2 (OXYGEN), Inhale 2 L/min Every Night., Disp: , Rfl:   •  omeprazole (priLOSEC) 40 MG capsule, , Disp: , Rfl:   •  predniSONE (DELTASONE) 10 MG tablet, TAKE ONE TABLET BY MOUTH DAILY, Disp: 90 tablet, Rfl: 0  •  sodium chloride 7 % nebulizer solution nebulizer solution, Take 4 mL by nebulization 2 (Two) Times a Day As Needed (Congestion)., Disp: 360 mL, Rfl: 3  •  sodium-potassium-magnesium sulfates (Suprep Bowel Prep Kit) 17.5-3.13-1.6 GM/177ML  solution oral solution, Please follow the directions in the letter mailed to you by our office for colonoscopy prep. If you have any questions call our office at [phone]., Disp: 354 mL, Rfl: 0  •  sodium-potassium-magnesium sulfates (Suprep Bowel Prep Kit) 17.5-3.13-1.6 GM/177ML solution oral solution, Please follow the directions in the letter mailed to you by our office for colonoscopy prep. If you have any questions call our office at [phone]., Disp: 354 mL, Rfl: 0  •  tiotropium bromide monohydrate (Spiriva Respimat) 2.5 MCG/ACT aerosol solution inhaler, Inhale 2 puffs Daily., Disp: 1 inhaler, Rfl: 11  •  traZODone (DESYREL) 100 MG tablet, Take 100 mg by mouth As Needed., Disp: , Rfl:   •  vitamin C (ASCORBIC ACID) 500 MG tablet, Take 500 mg by mouth 2 (Two) Times a Day., Disp: , Rfl:   •  vitamin D3 125 MCG (5000 UT) capsule capsule, Take 5,000 Units by mouth Daily., Disp: , Rfl:   MEDICATION LIST AND ALLERGIES REVIEWED.    Social History     Tobacco Use   • Smoking status: Former     Packs/day: 3.00     Years: 40.00     Pack years: 120.00     Types: Cigarettes     Quit date: 5/10/2010     Years since quittin.7   • Smokeless tobacco: Never   Vaping Use   • Vaping Use: Never used   Substance Use Topics   • Alcohol use: Yes     Comment: Socially    • Drug use: Yes     Types: Marijuana     Comment: brownie occas       FAMILY AND SOCIAL HISTORY REVIEWED.    Review of Systems   Constitutional: Positive for fatigue. Negative for activity change, appetite change, fever and unexpected weight change.   HENT: Negative for congestion, postnasal drip, rhinorrhea, sinus pressure, sore throat and voice change.    Eyes: Negative for visual disturbance.   Respiratory: Positive for shortness of breath. Negative for cough, chest tightness and wheezing.    Cardiovascular: Negative for chest pain, palpitations and leg swelling.   Gastrointestinal: Negative for abdominal distention, abdominal pain, nausea and  "vomiting.   Endocrine: Negative for cold intolerance and heat intolerance.   Genitourinary: Negative for difficulty urinating and urgency.   Musculoskeletal: Negative for arthralgias, back pain and neck pain.   Skin: Negative for color change and pallor.   Allergic/Immunologic: Negative for environmental allergies and food allergies.   Neurological: Negative for dizziness, syncope, weakness and light-headedness.   Hematological: Negative for adenopathy. Does not bruise/bleed easily.   Psychiatric/Behavioral: Negative for agitation and behavioral problems.   .    /70   Pulse 110   Temp 98 °F (36.7 °C)   Ht 162.6 cm (64\")   Wt 61.2 kg (135 lb)   LMP  (LMP Unknown)   SpO2 95% Comment: resting, room air  BMI 23.17 kg/m²     Immunization History   Administered Date(s) Administered   • COVID-19 (PFIZER) BIVALENT BOOSTER 12+YRS 09/20/2022   • COVID-19 (PFIZER) PURPLE CAP 03/02/2021, 03/23/2021, 10/13/2021   • COVID-19 (UNSPECIFIED) 10/13/2021   • FLUAD TRI 65YR+ 10/01/2020   • Fluzone High Dose =>65 Years ([employer] ONLY) 09/16/2016, 10/11/2017, 10/23/2018   • Fluzone High-Dose 65+yrs 10/07/2022   • Influenza, Unspecified 12/01/2017   • Pneumococcal Conjugate 13-Valent (PCV13) 09/29/2017   • Pneumococcal Polysaccharide (PPSV23) 12/01/2017       Physical Exam  Vitals and nursing note reviewed.   Constitutional:       Appearance: She is well-developed. She is not diaphoretic.   HENT:      Head: Normocephalic and atraumatic.   Eyes:      Pupils: Pupils are equal, round, and reactive to light.   Neck:      Thyroid: No thyromegaly.   Cardiovascular:      Rate and Rhythm: Normal rate and regular rhythm.      Heart sounds: Normal heart sounds. No murmur heard.    No friction rub. No gallop.   Pulmonary:      Effort: Pulmonary effort is normal. No respiratory distress.      Breath sounds: Normal breath sounds. No wheezing or rales.   Chest:      Chest wall: No tenderness.   Abdominal:      General: Bowel sounds are " normal.      Palpations: Abdomen is soft.      Tenderness: There is no abdominal tenderness.   Musculoskeletal:         General: No swelling. Normal range of motion.      Cervical back: Normal range of motion and neck supple.   Lymphadenopathy:      Cervical: No cervical adenopathy.   Skin:     General: Skin is warm and dry.      Capillary Refill: Capillary refill takes less than 2 seconds.   Neurological:      Mental Status: She is alert and oriented to person, place, and time.   Psychiatric:         Mood and Affect: Mood normal.         Behavior: Behavior normal.           RESULTS    PFTS in the office today, read by me, FVC 1.89 64% predicted, FEV1 0.94 42% predicted, FEV1/FVC 49% predicted, TLC 4.71 96% predicted, DLCO 65% predicted, severe obstruction, no restriction and severe diffusion.    PROBLEM LIST    Problem List Items Addressed This Visit        Pulmonary and Pneumonias    COPD (chronic obstructive pulmonary disease) (HCC) - Primary    Overview     Overview:   History: COPD.  PTA was on Stiolto inhalers  Assessment:   98% on room air  Plan:    - monitor sats  - Spiriva while inhouse  - cough and deep breath, Vibrapep  .            Symptoms and Signs    Dyspnea         DISCUSSION    Ms. Pickett was here for follow-up of her COPD.  We did review her full PFTs in the office today and she continues to have a severe obstruction.  She will remain on Brovana and budesonide nebulizers twice a day.  I did encourage her to use the albuterol as needed for shortness of breath.  She will continue Spiriva daily as well.  I did give her some samples of Spiriva in the office today.    I did encourage her to use the saline nebulizers twice a day to help with secretion clearance.    We had a discussion about discontinuing the prednisone.  I did advise her that she can go to every other day or cut the 10 mg in half to 5 mg to see if her breathing was affected.  I do not know if she will be successful in weaning the  prednisone completely.  She states that she is short of breath all the time regardless of the prednisone.    She will continue the nucala injections.  She has not noticed a significant improvement in her breathing since using this.    I encouraged her to do some physical activity daily to help with her dyspnea.    She will follow-up in 4 to 6 months or sooner if her symptoms worsen.  Advised her to call with any additional concerns or questions.    I personally spent a total of 31 minutes on patient visit today including chart review, face to face with the patient obtaining the history and physical exam, review of pertinent images and tests, counseling and discussion and/or coordination of care as described above, and documentation.  Total time excludes time spent on other separate services such as performing procedures or test interpretation, if applicable.        Glory Alejandro, APRN  02/21/202315:14 EST  Electronically signed     Please note that portions of this note were completed with a voice recognition program.        CC: Lucho Peters MD

## 2023-03-06 DIAGNOSIS — J44.9 CHRONIC OBSTRUCTIVE PULMONARY DISEASE, UNSPECIFIED COPD TYPE: ICD-10-CM

## 2023-03-06 RX ORDER — MONTELUKAST SODIUM 10 MG/1
10 TABLET ORAL
Qty: 90 TABLET | Refills: 1 | Status: SHIPPED | OUTPATIENT
Start: 2023-03-06

## 2023-03-23 ENCOUNTER — LAB (OUTPATIENT)
Dept: ENDOCRINOLOGY | Facility: CLINIC | Age: 73
End: 2023-03-23
Payer: MEDICARE

## 2023-03-23 DIAGNOSIS — E05.90 HYPERTHYROIDISM: ICD-10-CM

## 2023-03-23 PROCEDURE — 84439 ASSAY OF FREE THYROXINE: CPT | Performed by: INTERNAL MEDICINE

## 2023-03-23 PROCEDURE — 36415 COLL VENOUS BLD VENIPUNCTURE: CPT | Performed by: INTERNAL MEDICINE

## 2023-03-23 PROCEDURE — 84443 ASSAY THYROID STIM HORMONE: CPT | Performed by: INTERNAL MEDICINE

## 2023-03-24 LAB
T4 FREE SERPL-MCNC: 1.38 NG/DL (ref 0.93–1.7)
TSH SERPL DL<=0.05 MIU/L-ACNC: 0.23 UIU/ML (ref 0.27–4.2)

## 2023-04-03 RX ORDER — PREDNISONE 10 MG/1
TABLET ORAL
Qty: 90 TABLET | Refills: 0 | Status: SHIPPED | OUTPATIENT
Start: 2023-04-03

## 2023-04-05 ENCOUNTER — OFFICE VISIT (OUTPATIENT)
Dept: ENDOCRINOLOGY | Facility: CLINIC | Age: 73
End: 2023-04-05
Payer: MEDICARE

## 2023-04-05 VITALS
BODY MASS INDEX: 24.24 KG/M2 | OXYGEN SATURATION: 100 % | DIASTOLIC BLOOD PRESSURE: 77 MMHG | HEIGHT: 64 IN | HEART RATE: 95 BPM | SYSTOLIC BLOOD PRESSURE: 120 MMHG | WEIGHT: 142 LBS

## 2023-04-05 DIAGNOSIS — E05.90 HYPERTHYROIDISM: Primary | ICD-10-CM

## 2023-04-05 PROCEDURE — 1160F RVW MEDS BY RX/DR IN RCRD: CPT | Performed by: INTERNAL MEDICINE

## 2023-04-05 PROCEDURE — 1159F MED LIST DOCD IN RCRD: CPT | Performed by: INTERNAL MEDICINE

## 2023-04-05 PROCEDURE — 99213 OFFICE O/P EST LOW 20 MIN: CPT | Performed by: INTERNAL MEDICINE

## 2023-04-05 NOTE — PROGRESS NOTES
"     Office Note      Date: 2023  Patient Name: Ita Pickett  MRN: [id number]  : 1950    Chief Complaint   Patient presents with   • Hyperthyroidism       History of Present Illness:   Ita Pickett is a 72 y.o. female who presents for Hyperthyroidism  .   Current rx:  NONE- METHIMAZOLE 5 MG PER DAY WAS STOPPED IN January BECAUSE TSH WAS 27    RECENT TSH WAS  0.228     Changes in history:NONE   Questions /problems:still strugglling with weight    Subjective          Review of Systems:   Review of Systems   Constitutional: Positive for fatigue. Negative for unexpected weight change.   HENT: Positive for trouble swallowing and voice change.    Respiratory: Positive for shortness of breath.    Cardiovascular: Negative for palpitations.   Endocrine: Positive for heat intolerance.   Neurological: Positive for tremors.   Psychiatric/Behavioral: Positive for sleep disturbance. Negative for dysphoric mood. The patient is not nervous/anxious.        The following portions of the patient's history were reviewed and updated as appropriate: allergies, current medications, past family history, past medical history, past social history, past surgical history and problem list.    Objective     Visit Vitals  /77   Pulse 95   Ht 162.6 cm (64\")   Wt 64.4 kg (142 lb)   LMP  (LMP Unknown)   SpO2 100%   BMI 24.37 kg/m²           Physical Exam:  Physical Exam  Vitals reviewed.   Constitutional:       Appearance: Normal appearance.   HENT:      Head: Normocephalic and atraumatic.   Neck:      Comments: IRREGULAR GLAND   Cardiovascular:      Rate and Rhythm: Normal rate.   Lymphadenopathy:      Cervical: No cervical adenopathy.   Neurological:      Mental Status: She is alert.   Psychiatric:         Mood and Affect: Mood normal.         Thought Content: Thought content normal.         Judgment: Judgment normal.         Assessment / Plan      Assessment & Plan:  Problem List Items Addressed This Visit        " Other    Hyperthyroidism - Primary    Overview     RECURRENT FOLLOWING REMOTE PARTIAL THYROIDECTOMY  //  Hypothyroid when just on 5 mg of methimazole         Current Assessment & Plan     Improved. But just slightly hyperthyroid on no meds.  No treatment needed at this time. Will follow up in 4 months with labs prior          Relevant Medications    metoprolol tartrate (LOPRESSOR) 50 MG tablet    predniSONE (DELTASONE) 10 MG tablet    Other Relevant Orders    TSH    T4, Free        Dae Goody MD   04/05/2023

## 2023-04-05 NOTE — ASSESSMENT & PLAN NOTE
Improved. But just slightly hyperthyroid on no meds.  No treatment needed at this time. Will follow up in 4 months with labs prior

## 2023-06-08 ENCOUNTER — OFFICE VISIT (OUTPATIENT)
Dept: PULMONOLOGY | Facility: CLINIC | Age: 73
End: 2023-06-08
Payer: MEDICARE

## 2023-06-08 VITALS
OXYGEN SATURATION: 95 % | HEART RATE: 106 BPM | WEIGHT: 133.7 LBS | DIASTOLIC BLOOD PRESSURE: 50 MMHG | BODY MASS INDEX: 22.83 KG/M2 | SYSTOLIC BLOOD PRESSURE: 120 MMHG | TEMPERATURE: 97.8 F | HEIGHT: 64 IN

## 2023-06-08 DIAGNOSIS — G47.34 NOCTURNAL HYPOXEMIA: ICD-10-CM

## 2023-06-08 DIAGNOSIS — Z87.891 FORMER SMOKER: ICD-10-CM

## 2023-06-08 DIAGNOSIS — R06.09 DOE (DYSPNEA ON EXERTION): Primary | ICD-10-CM

## 2023-06-08 DIAGNOSIS — J45.50 SEVERE PERSISTENT ASTHMA WITHOUT COMPLICATION: ICD-10-CM

## 2023-06-08 DIAGNOSIS — Z85.118 HISTORY OF LUNG CANCER: ICD-10-CM

## 2023-06-08 DIAGNOSIS — Z87.19 HISTORY OF ESOPHAGEAL STRICTURE: ICD-10-CM

## 2023-06-08 DIAGNOSIS — K22.0 ACHALASIA: ICD-10-CM

## 2023-06-08 DIAGNOSIS — J44.9 COPD MIXED TYPE: ICD-10-CM

## 2023-06-08 DIAGNOSIS — Z85.43 HISTORY OF OVARIAN CANCER: ICD-10-CM

## 2023-06-08 DIAGNOSIS — Z85.71 HISTORY OF HODGKIN'S LYMPHOMA: ICD-10-CM

## 2023-06-08 NOTE — PROGRESS NOTES
PULMONARY  NOTE    Chief Complaint     Stage IV COPD, former smoker, history of lung cancer with a right lower lobe lobectomy, history of Hodgkin's disease with radiation therapy to the chest, nocturnal hypoxemia, esophageal dysphagia and achalasia with multiple prior dilations and previous PEG    History of Present Illness     72-year-old female returns today for follow-up  She previously was seen by EDWARDO Cohen in February 2023  She has been a patient of [facility]    She has a history of tobacco abuse, resolved in 2010    She has stage IV, very severe, chronic obstructive pulmonary disease  Her bronchodilator regimen includes Brovana, DuoNebs, and budesonide  She also has been on low-dose steroids frequently, as well    She has severe lifestyle limiting dyspnea  She gets short of breath with minimal activity    She previously underwent a right lower lobe lobectomy in 2004 non-small cell lung cancer  She is had no evidence of recurrence to date    In the 1980s she received chest radiation therapy for Hodgkin's lymphoma    She has supplemental oxygen which she primarily uses at night    She has noted a slow but definite worsening of her esophageal dysphagia symptoms    Patient Active Problem List   Diagnosis    Achalasia    History of lung cancer (RLL Lobectomy 2000)    Breast cancer    History of esophageal stricture    Esophageal dysphagia    History of radiation therapy    Pain around PEG tube site    SVT (supraventricular tachycardia)    Pericardial effusion    Acquired gastric fistula    Gastrocutaneous fistula    Iron deficiency anemia    Thrombocytosis    Aspiration into airway    Adenomatous polyp of colon    Hyperthyroidism    Benign esophageal stricture    History of ovarian cancer (Prior XRT 1975)    Nocturnal hypoxemia    Former smoker (Stopped 2010)    ZHONG (dyspnea on exertion)    Stage IV (very severe) COPD    H/O Hodgkin lymphoma (S/P chest XRT and splenectomy)     No Known  Allergies    Current Outpatient Medications:     albuterol sulfate  (90 Base) MCG/ACT inhaler, Inhale 2 puffs Every 4 (Four) Hours As Needed for Wheezing or Shortness of Air. Use with spacer, Disp: 54 g, Rfl: 2    Brovana 15 MCG/2ML nebulizer solution, 2 mL 2 (Two) Times a Day., Disp: , Rfl:     budesonide (PULMICORT) 0.5 MG/2ML nebulizer solution, Take 2 mL by nebulization Daily., Disp: , Rfl:     diazePAM (VALIUM) 5 MG tablet, Take 1 tablet by mouth Every 8 (Eight) Hours As Needed., Disp: , Rfl:     fluticasone (Flonase) 50 MCG/ACT nasal spray, 2 sprays by Each Nare route 2 (Two) Times a Day., Disp: 18.2 mL, Rfl: 11    hydrocortisone (ANUSOL-HC) 25 MG suppository, Insert 1 suppository into the rectum Daily., Disp: 14 suppository, Rfl: 1    levocetirizine (XYZAL) 5 MG tablet, TAKE 1 TABLET EVERY EVENING, Disp: 90 tablet, Rfl: 3    Mepolizumab 100 MG/ML solution auto-injector, Inject 1 mL under the skin into the appropriate area as directed Every 28 (Twenty-Eight) Days., Disp: 1 mL, Rfl: 11    metoprolol tartrate (LOPRESSOR) 50 MG tablet, Take 1 tablet by mouth 2 (Two) Times a Day., Disp: , Rfl:     montelukast (SINGULAIR) 10 MG tablet, TAKE 1 TABLET BY MOUTH EVERY NIGHT AT BEDTIME., Disp: 90 tablet, Rfl: 1    multivitamin with minerals tablet tablet, Take 1 tablet by mouth Daily., Disp: , Rfl:     O2 (OXYGEN), Inhale 2 L/min Every Night., Disp: , Rfl:     omeprazole (priLOSEC) 40 MG capsule, , Disp: , Rfl:     predniSONE (DELTASONE) 10 MG tablet, TAKE ONE TABLET BY MOUTH DAILY, Disp: 90 tablet, Rfl: 0    sodium chloride 7 % nebulizer solution nebulizer solution, Take 4 mL by nebulization 2 (Two) Times a Day As Needed (Congestion)., Disp: 360 mL, Rfl: 3    sodium-potassium-magnesium sulfates (Suprep Bowel Prep Kit) 17.5-3.13-1.6 GM/177ML solution oral solution, Please follow the directions in the letter mailed to you by our office for colonoscopy prep. If you have any questions call our office at  "[phone]., Disp: 354 mL, Rfl: 0    sodium-potassium-magnesium sulfates (Suprep Bowel Prep Kit) 17.5-3.13-1.6 GM/177ML solution oral solution, Please follow the directions in the letter mailed to you by our office for colonoscopy prep. If you have any questions call our office at [phone]., Disp: 354 mL, Rfl: 0    tiotropium bromide monohydrate (Spiriva Respimat) 2.5 MCG/ACT aerosol solution inhaler, Inhale 2 puffs Daily., Disp: 1 inhaler, Rfl: 11    vitamin D3 125 MCG (5000 UT) capsule capsule, Take 1 capsule by mouth Daily. Pt taking off and on, Disp: , Rfl:     traZODone (DESYREL) 100 MG tablet, Take 100 mg by mouth As Needed. (Patient not taking: Reported on 2023), Disp: , Rfl:     vitamin C (ASCORBIC ACID) 500 MG tablet, Take 500 mg by mouth 2 (Two) Times a Day. (Patient not taking: Reported on 2023), Disp: , Rfl:   MEDICATION LIST AND ALLERGIES REVIEWED.    Family History   Problem Relation Age of Onset    Cancer Mother     Lung cancer Mother     Heart disease Father     Heart disease Brother     Breast cancer Maternal Grandmother     Colon polyps Sister     Colon cancer Neg Hx      Social History     Tobacco Use    Smoking status: Former     Packs/day: 3.00     Years: 40.00     Pack years: 120.00     Types: Cigarettes     Quit date: 5/10/2010     Years since quittin.0    Smokeless tobacco: Never   Vaping Use    Vaping Use: Never used   Substance Use Topics    Alcohol use: Yes     Comment: Socially     Drug use: Yes     Types: Marijuana     Comment: giorgi rivera     Social History     Social History Narrative    Not on file     FAMILY AND SOCIAL HISTORY REVIEWED.    Review of Systems  IF PRESENT REFER TO SCANNED ROS SHEET FROM SAME DATE  OTHERWISE ROS OBTAINED AND NON-CONTRIBUTORY OVER HPI.    /50 (BP Location: Left arm, Patient Position: Sitting, Cuff Size: Adult)   Pulse 106   Temp 97.8 °F (36.6 °C)   Ht 162.6 cm (64\")   Wt 60.6 kg (133 lb 11.2 oz)   LMP  (LMP Unknown)   SpO2 " 95% Comment: Room air at rest  BMI 22.95 kg/m²   Physical Exam  Vitals and nursing note reviewed.   Constitutional:       General: She is not in acute distress.     Appearance: She is well-developed. She is not diaphoretic.   HENT:      Head: Normocephalic and atraumatic.   Neck:      Thyroid: No thyromegaly.   Cardiovascular:      Rate and Rhythm: Normal rate and regular rhythm.      Heart sounds: Normal heart sounds. No murmur heard.  Pulmonary:      Effort: Pulmonary effort is normal.      Breath sounds: No stridor.      Comments: Decreased breath sounds bilaterally with scattered expiratory wheezes  Lymphadenopathy:      Cervical: No cervical adenopathy.      Upper Body:      Right upper body: No supraclavicular or epitrochlear adenopathy.      Left upper body: No supraclavicular or epitrochlear adenopathy.   Skin:     General: Skin is warm and dry.   Neurological:      Mental Status: She is alert and oriented to person, place, and time.   Psychiatric:         Behavior: Behavior normal.       Results     Last CT scan of the chest on 3/14/2022 reviewed on PACS  Stability in the previously noted postoperative and postradiation changes    Immunization History   Administered Date(s) Administered    COVID-19 (PFIZER) BIVALENT 12+YRS 09/20/2022    COVID-19 (PFIZER) Purple Cap Monovalent 03/02/2021, 03/23/2021, 10/13/2021    COVID-19 (UNSPECIFIED) 10/13/2021    FLUAD TRI 65YR+ 10/01/2020    Fluzone High Dose =>65 Years ([employer] ONLY) 09/16/2016, 10/11/2017, 10/23/2018    Fluzone High-Dose 65+yrs 10/07/2022    Influenza, Unspecified 12/01/2017    Pneumococcal Conjugate 13-Valent (PCV13) 09/29/2017    Pneumococcal Polysaccharide (PPSV23) 12/01/2017     Problem List       ICD-10-CM ICD-9-CM   1. ZHONG (dyspnea on exertion)  R06.09 786.09   2. Stage IV (very severe) COPD  J44.9 496   3. Former smoker (Stopped 2010)  Z87.891 V15.82   4. Achalasia  K22.0 530.0   5. History of esophageal stricture  Z87.19 V12.79   6. History  of lung cancer (RLL Lobectomy 2000)  Z85.118 V10.11   7. History of ovarian cancer (Prior XRT 1975)  Z85.43 V10.43   8. H/O Hodgkin lymphoma (S/P chest XRT and splenectomy)  Z85.71 V10.72   9. Nocturnal hypoxemia  G47.34 327.24       Discussion     We reviewed her test results including CT scan of the chest, PFTs    She has advanced stage COPD  In addition has had a resection and radiation therapy which is resulted in a concomitant restrictive defect  Its not unexpected that she would have significant dyspnea with minimal activity  Unfortunately, there is not much in the way of medical therapy that has not already been tried    I would recommend continued use of Brovana  We will increase her budesonide to 1 mg twice a day  Continue to use Spiriva and albuterol as needed    She is not really sure that adding the Nucala over the last 4 months is really helped  I recommended stopping it but she wants to stay on it for the time being    Aortic valve disease may be contributing to her shortness of breath  She does not have evidence of decompensated heart failure today, however    She is going to follow-up with gastroenterology regarding her esophageal stricture and increasing esophageal dysphagia symptoms    She would benefit from a repeat CT scan of the chest given her smoking history within the last 15 years    I will plan to see her back in a couple of months for follow-up    Moderate level of Medical Decision Making complexity based on 2 or more chronic stable illnesses and an independent review of test results and/or prescription drug management.    Anatoly Collier MD  Note electronically signed    CC: Lucho Peters MD

## 2023-06-09 RX ORDER — BUDESONIDE 0.5 MG/2ML
0.5 INHALANT ORAL 2 TIMES DAILY
Qty: 60 EACH | Refills: 11
Start: 2023-06-09 | End: 2023-06-12 | Stop reason: ALTCHOICE

## 2023-06-12 DIAGNOSIS — R06.02 SHORTNESS OF BREATH: Primary | ICD-10-CM

## 2023-06-12 DIAGNOSIS — J44.9 COPD MIXED TYPE: Primary | ICD-10-CM

## 2023-06-12 DIAGNOSIS — Z85.118 HISTORY OF LUNG CANCER: ICD-10-CM

## 2023-06-12 DIAGNOSIS — Z87.891 FORMER SMOKER: ICD-10-CM

## 2023-06-12 RX ORDER — BUDESONIDE 1 MG/2ML
1 INHALANT ORAL 2 TIMES DAILY
Qty: 360 ML | Refills: 3 | Status: SHIPPED | OUTPATIENT
Start: 2023-06-12

## 2023-06-13 DIAGNOSIS — J44.9 COPD MIXED TYPE: Primary | ICD-10-CM

## 2023-06-13 DIAGNOSIS — J45.50 SEVERE PERSISTENT ASTHMA WITHOUT COMPLICATION: ICD-10-CM

## 2023-06-13 RX ORDER — BUDESONIDE 0.5 MG/2ML
0.5 INHALANT ORAL 2 TIMES DAILY
Qty: 60 EACH | Refills: 11
Start: 2023-06-13

## 2023-06-15 DIAGNOSIS — R06.09 DOE (DYSPNEA ON EXERTION): Primary | ICD-10-CM

## 2023-07-27 ENCOUNTER — LAB (OUTPATIENT)
Dept: LAB | Facility: HOSPITAL | Age: 73
End: 2023-07-27
Payer: MEDICARE

## 2023-07-27 ENCOUNTER — LAB (OUTPATIENT)
Dept: ENDOCRINOLOGY | Facility: CLINIC | Age: 73
End: 2023-07-27
Payer: MEDICARE

## 2023-07-27 DIAGNOSIS — E05.90 HYPERTHYROIDISM: ICD-10-CM

## 2023-07-27 LAB
T4 FREE SERPL-MCNC: 1.48 NG/DL (ref 0.93–1.7)
TSH SERPL DL<=0.05 MIU/L-ACNC: 0.1 UIU/ML (ref 0.27–4.2)

## 2023-07-27 PROCEDURE — 84439 ASSAY OF FREE THYROXINE: CPT

## 2023-07-27 PROCEDURE — 84443 ASSAY THYROID STIM HORMONE: CPT

## 2023-08-08 ENCOUNTER — OFFICE VISIT (OUTPATIENT)
Dept: ENDOCRINOLOGY | Facility: CLINIC | Age: 73
End: 2023-08-08
Payer: MEDICARE

## 2023-08-08 VITALS
OXYGEN SATURATION: 95 % | HEIGHT: 64 IN | BODY MASS INDEX: 22.71 KG/M2 | HEART RATE: 121 BPM | DIASTOLIC BLOOD PRESSURE: 70 MMHG | WEIGHT: 133 LBS | SYSTOLIC BLOOD PRESSURE: 116 MMHG

## 2023-08-08 DIAGNOSIS — R73.03 PRE-DIABETES: ICD-10-CM

## 2023-08-08 DIAGNOSIS — E05.90 HYPERTHYROIDISM: Primary | ICD-10-CM

## 2023-08-08 PROBLEM — R35.89 POLYURIA: Status: ACTIVE | Noted: 2023-08-08

## 2023-08-08 LAB
EXPIRATION DATE: ABNORMAL
EXPIRATION DATE: NORMAL
GLUCOSE BLDC GLUCOMTR-MCNC: 161 MG/DL (ref 70–130)
HBA1C MFR BLD: 5.9 %
Lab: ABNORMAL
Lab: NORMAL

## 2023-08-08 PROCEDURE — 1159F MED LIST DOCD IN RCRD: CPT | Performed by: INTERNAL MEDICINE

## 2023-08-08 PROCEDURE — 83036 HEMOGLOBIN GLYCOSYLATED A1C: CPT | Performed by: INTERNAL MEDICINE

## 2023-08-08 PROCEDURE — 1160F RVW MEDS BY RX/DR IN RCRD: CPT | Performed by: INTERNAL MEDICINE

## 2023-08-08 PROCEDURE — 3044F HG A1C LEVEL LT 7.0%: CPT | Performed by: INTERNAL MEDICINE

## 2023-08-08 PROCEDURE — 82947 ASSAY GLUCOSE BLOOD QUANT: CPT | Performed by: INTERNAL MEDICINE

## 2023-08-08 PROCEDURE — 99214 OFFICE O/P EST MOD 30 MIN: CPT | Performed by: INTERNAL MEDICINE

## 2023-08-08 NOTE — PROGRESS NOTES
"     Office Note      Date: 2023  Patient Name: Ita Pickett  MRN: [id number]  : 1950    Chief Complaint   Patient presents with    Hyperthyroidism       History of Present Illness:   Ita Pickett is a 72 y.o. female who presents for Hyperthyroidism  .   Current rx: none. She became pretty significantly hypothyroid when on 5 mg per day of methimazole     Changes in history:none   Questions /problems: she notes fatigue. She notes she is having to get up 3 times each night to pee     Subjective          Review of Systems:   Review of Systems   Constitutional:  Positive for fatigue. Negative for appetite change.   Endocrine: Positive for heat intolerance, polydipsia and polyuria.     The following portions of the patient's history were reviewed and updated as appropriate: allergies, current medications, past family history, past medical history, past social history, past surgical history, and problem list.    Objective     Visit Vitals  /70   Pulse (!) 121   Ht 162.6 cm (64\")   Wt 60.3 kg (133 lb)   LMP  (LMP Unknown)   SpO2 95%   BMI 22.83 kg/mý           Physical Exam:  Physical Exam  Vitals reviewed.   Constitutional:       Appearance: Normal appearance.   Eyes:      Extraocular Movements: Extraocular movements intact.   Neck:      Comments: Thyroid is slightly enlarged   Lymphadenopathy:      Cervical: No cervical adenopathy.   Skin:     General: Skin is warm.   Neurological:      Mental Status: She is alert.   Psychiatric:         Mood and Affect: Mood normal.         Behavior: Behavior normal.         Thought Content: Thought content normal.         Judgment: Judgment normal.       Assessment / Plan      Assessment & Plan:  Problem List Items Addressed This Visit          Other    Hyperthyroidism - Primary    Overview     RECURRENT FOLLOWING REMOTE PARTIAL THYROIDECTOMY  //  Hypothyroid when just on 5 mg of methimazole         Current Assessment & Plan     Labs are slightly worse " and she is clinically more hyperthyorid. I suggested resuming a lower dose of methimazole. She does not want to do that          Relevant Medications    metoprolol tartrate (LOPRESSOR) 50 MG tablet    predniSONE (DELTASONE) 10 MG tablet    Other Relevant Orders    TSH    T4, Free    T3, Free    Pre-diabetes    Current Assessment & Plan     A1c is in the pre- diabetic range             Dae Godoy MD   08/08/2023

## 2023-08-08 NOTE — ASSESSMENT & PLAN NOTE
Labs are slightly worse and she is clinically more hyperthyorid. I suggested resuming a lower dose of methimazole. She does not want to do that

## 2023-08-11 PROBLEM — R73.03 PRE-DIABETES: Status: ACTIVE | Noted: 2023-08-08

## 2023-09-07 ENCOUNTER — TELEPHONE (OUTPATIENT)
Dept: GASTROENTEROLOGY | Facility: CLINIC | Age: 73
End: 2023-09-07

## 2023-09-07 NOTE — TELEPHONE ENCOUNTER
Caller: Ita Pickett    Relationship to patient: Self    Best call back number: [phone]    Patient is needing: PT CALLED NEEDING TO LEAVE A MESSAGE FOR DR LUNSFORD. SHE HAS A COLONOSCOPY COMING UP ON 9/19/23, AND WOULD LIKE TO KNOW IF SHE CAN ALSO GET HER ESOPHAGUS STRETCHED AT THE SAME TIME. SHE SAID SHE'S DONE IT LIKE THAT BEFORE, BUT IT NEEDS TO BE APPROVED THROUGH HER INSURANCE FIRST. SHE ALSO WANTED TO KNOW IF SHE CAN TAKE THE SUPREP TABLETS FOR THE PREP. PLEASE CALL BACK AND ADVISE.

## 2023-09-11 RX ORDER — SODIUM, POTASSIUM,MAG SULFATES 17.5-3.13G
2 SOLUTION, RECONSTITUTED, ORAL ORAL TAKE AS DIRECTED
Qty: 354 ML | Refills: 0 | Status: SHIPPED | OUTPATIENT
Start: 2023-09-11

## 2023-09-19 ENCOUNTER — OUTSIDE FACILITY SERVICE (OUTPATIENT)
Dept: GASTROENTEROLOGY | Facility: CLINIC | Age: 73
End: 2023-09-19
Payer: MEDICARE

## 2023-09-19 PROCEDURE — 88305 TISSUE EXAM BY PATHOLOGIST: CPT

## 2023-09-20 ENCOUNTER — LAB REQUISITION (OUTPATIENT)
Dept: LAB | Facility: HOSPITAL | Age: 73
End: 2023-09-20
Payer: MEDICARE

## 2023-09-20 DIAGNOSIS — Z12.11 ENCOUNTER FOR SCREENING FOR MALIGNANT NEOPLASM OF COLON: ICD-10-CM

## 2023-09-20 DIAGNOSIS — Z09 ENCOUNTER FOR FOLLOW-UP EXAMINATION AFTER COMPLETED TREATMENT FOR CONDITIONS OTHER THAN MALIGNANT NEOPLASM: ICD-10-CM

## 2023-09-20 DIAGNOSIS — Z86.010 PERSONAL HISTORY OF COLONIC POLYPS: ICD-10-CM

## 2023-09-20 DIAGNOSIS — D12.5 BENIGN NEOPLASM OF SIGMOID COLON: ICD-10-CM

## 2023-09-20 DIAGNOSIS — D12.3 BENIGN NEOPLASM OF TRANSVERSE COLON: ICD-10-CM

## 2023-09-21 LAB — REF LAB TEST METHOD: NORMAL

## 2023-10-19 ENCOUNTER — OFFICE VISIT (OUTPATIENT)
Dept: PULMONOLOGY | Facility: CLINIC | Age: 73
End: 2023-10-19
Payer: MEDICARE

## 2023-10-19 VITALS
DIASTOLIC BLOOD PRESSURE: 68 MMHG | BODY MASS INDEX: 22.44 KG/M2 | OXYGEN SATURATION: 96 % | WEIGHT: 131.44 LBS | TEMPERATURE: 97.7 F | SYSTOLIC BLOOD PRESSURE: 124 MMHG | HEART RATE: 108 BPM | HEIGHT: 64 IN | RESPIRATION RATE: 22 BRPM

## 2023-10-19 DIAGNOSIS — Z85.118 HISTORY OF LUNG CANCER: ICD-10-CM

## 2023-10-19 DIAGNOSIS — J44.9 COPD MIXED TYPE: Primary | ICD-10-CM

## 2023-10-19 DIAGNOSIS — K22.0 ACHALASIA: ICD-10-CM

## 2023-10-19 DIAGNOSIS — R13.19 ESOPHAGEAL DYSPHAGIA: ICD-10-CM

## 2023-10-19 DIAGNOSIS — Z85.71 HISTORY OF HODGKIN'S LYMPHOMA: ICD-10-CM

## 2023-10-19 DIAGNOSIS — Z87.891 FORMER SMOKER: ICD-10-CM

## 2023-10-19 DIAGNOSIS — G47.34 NOCTURNAL HYPOXEMIA: ICD-10-CM

## 2023-10-19 RX ORDER — ALBUTEROL SULFATE 90 UG/1
2 AEROSOL, METERED RESPIRATORY (INHALATION) EVERY 4 HOURS PRN
Qty: 18 G | Refills: 11 | Status: SHIPPED | OUTPATIENT
Start: 2023-10-19

## 2023-10-19 RX ORDER — TIOTROPIUM BROMIDE 18 UG/1
1 CAPSULE ORAL; RESPIRATORY (INHALATION)
Qty: 30 CAPSULE | Refills: 11 | Status: SHIPPED | OUTPATIENT
Start: 2023-10-19

## 2023-10-19 NOTE — PROGRESS NOTES
PULMONARY  NOTE    Chief Complaint     Stage IV COPD, former smoker, history of lung cancer with a right lower lobe lobectomy, history of Hodgkin's disease with prior radiation therapy, nocturnal hypoxemia, esophageal dysphagia and achalasia with multiple prior dilations and prior PEG    History of Present Illness     72-year-old female returns today for follow-up  I last saw her 6/8/2023  Previously a patient of Dr. Gage Collier's    She has a history of tobacco abuse, resolved in 2010    She has stage IV, very severe, chronic obstructive pulmonary disease  She has been on Brovana, DuoNebs, and budesonide  After her last visit I recommended increasing her budesonide but apparently that never happened    She continues to have lifestyle limiting dyspnea  She gets short of breath with minimal activity    She has a history of non-small lung cancer for which she underwent a right lower lobe lobectomy in 2004  She is had no recurrence to date    In the 1980s she received chest radiation therapy for Hodgkin's lymphoma    She uses supplemental oxygen primarily at night    She has ongoing esophageal dysphagia symptoms    Patient Active Problem List   Diagnosis    Achalasia    History of lung cancer (RLL Lobectomy 2000)    Breast cancer    History of esophageal stricture    Esophageal dysphagia    History of radiation therapy    Pain around PEG tube site    SVT (supraventricular tachycardia)    Pericardial effusion    Acquired gastric fistula    Gastrocutaneous fistula    Iron deficiency anemia    Thrombocytosis    Aspiration into airway    Adenomatous polyp of colon    Hyperthyroidism    Benign esophageal stricture    History of ovarian cancer (Prior XRT 1975)    Nocturnal hypoxemia    Former smoker (Stopped 2010)    ZHONG (dyspnea on exertion)    Stage IV (very severe) COPD    H/O Hodgkin lymphoma (S/P chest XRT and splenectomy)    Pre-diabetes      No Known Allergies    Current Outpatient Medications:     albuterol  sulfate  (90 Base) MCG/ACT inhaler, Inhale 2 puffs Every 4 (Four) Hours As Needed for Wheezing or Shortness of Air. Use with spacer, Disp: 54 g, Rfl: 2    Brovana 15 MCG/2ML nebulizer solution, 2 mL 2 (Two) Times a Day., Disp: , Rfl:     budesonide (Pulmicort) 0.5 MG/2ML nebulizer solution, Take 2 mL by nebulization 2 (Two) Times a Day., Disp: 60 each, Rfl: 11    fluticasone (Flonase) 50 MCG/ACT nasal spray, 2 sprays by Each Nare route 2 (Two) Times a Day., Disp: 18.2 mL, Rfl: 11    levocetirizine (XYZAL) 5 MG tablet, TAKE 1 TABLET EVERY EVENING, Disp: 90 tablet, Rfl: 3    Mepolizumab 100 MG/ML solution auto-injector, Inject 1 mL under the skin into the appropriate area as directed Every 28 (Twenty-Eight) Days., Disp: 1 mL, Rfl: 11    metoprolol tartrate (LOPRESSOR) 50 MG tablet, Take 1 tablet by mouth 2 (Two) Times a Day., Disp: , Rfl:     montelukast (SINGULAIR) 10 MG tablet, TAKE 1 TABLET BY MOUTH EVERY NIGHT AT BEDTIME., Disp: 90 tablet, Rfl: 1    multivitamin with minerals tablet tablet, Take 1 tablet by mouth Daily., Disp: , Rfl:     O2 (OXYGEN), Inhale 2 L/min Every Night., Disp: , Rfl:     omeprazole (priLOSEC) 40 MG capsule, , Disp: , Rfl:     predniSONE (DELTASONE) 10 MG tablet, Take 1 tablet by mouth Daily., Disp: 90 tablet, Rfl: 1    sodium chloride 7 % nebulizer solution nebulizer solution, Take 4 mL by nebulization 2 (Two) Times a Day As Needed (Congestion)., Disp: 360 mL, Rfl: 3    sodium-potassium-magnesium sulfates (Suprep Bowel Prep Kit) 17.5-3.13-1.6 GM/177ML solution oral solution, Take 2 bottles by mouth Take As Directed. Do not eat the day before your procedure. If you didn't receive instructions call [phone]., Disp: 354 mL, Rfl: 0    tiotropium bromide monohydrate (Spiriva Respimat) 2.5 MCG/ACT aerosol solution inhaler, Inhale 2 puffs Daily., Disp: 1 inhaler, Rfl: 11    traZODone (DESYREL) 100 MG tablet, Take 1 tablet by mouth As Needed., Disp: , Rfl:     vitamin C (ASCORBIC  "ACID) 500 MG tablet, Take 1 tablet by mouth 2 (Two) Times a Day., Disp: , Rfl:     vitamin D3 125 MCG (5000 UT) capsule capsule, Take 1 capsule by mouth Daily. Pt taking off and on, Disp: , Rfl:     albuterol sulfate  (90 Base) MCG/ACT inhaler, Inhale 2 puffs Every 4 (Four) Hours As Needed for Wheezing., Disp: 18 g, Rfl: 11    tiotropium (SPIRIVA) 18 MCG per inhalation capsule, Place 1 capsule into inhaler and inhale Daily., Disp: 30 capsule, Rfl: 11  MEDICATION LIST AND ALLERGIES REVIEWED.    Family History   Problem Relation Age of Onset    Cancer Mother     Lung cancer Mother     Heart disease Father     Heart disease Brother     Breast cancer Maternal Grandmother     Colon polyps Sister     Colon cancer Neg Hx      Social History     Tobacco Use    Smoking status: Former     Packs/day: 3.00     Years: 40.00     Additional pack years: 0.00     Total pack years: 120.00     Types: Cigarettes     Quit date: 5/10/2010     Years since quittin.4    Smokeless tobacco: Never   Vaping Use    Vaping Use: Never used   Substance Use Topics    Alcohol use: Yes     Comment: Socially     Drug use: Yes     Types: Marijuana     Comment: giorgi rivera     Social History     Social History Narrative    Not on file     FAMILY AND SOCIAL HISTORY REVIEWED.    Review of Systems  IF PRESENT REFER TO SCANNED ROS SHEET FROM SAME DATE  OTHERWISE ROS OBTAINED AND NON-CONTRIBUTORY OVER HPI.    /68 (BP Location: Right arm, Patient Position: Sitting, Cuff Size: Adult)   Pulse 108   Temp 97.7 °F (36.5 °C)   Resp 22   Ht 162.6 cm (64.02\")   Wt 59.6 kg (131 lb 7 oz)   LMP  (LMP Unknown)   SpO2 96% Comment: room air at rest  BMI 22.55 kg/m²   Physical Exam  Vitals and nursing note reviewed.   Constitutional:       General: She is not in acute distress.     Appearance: She is well-developed. She is not diaphoretic.   HENT:      Head: Normocephalic and atraumatic.   Neck:      Thyroid: No thyromegaly.   Cardiovascular:      " Rate and Rhythm: Normal rate and regular rhythm.      Heart sounds: Normal heart sounds. No murmur heard.  Pulmonary:      Effort: Pulmonary effort is normal.      Breath sounds: No stridor.      Comments: Decreased breath sounds bilaterally without wheezes or crackles  Lymphadenopathy:      Cervical: No cervical adenopathy.      Upper Body:      Right upper body: No supraclavicular or epitrochlear adenopathy.      Left upper body: No supraclavicular or epitrochlear adenopathy.   Skin:     General: Skin is warm and dry.   Neurological:      Mental Status: She is alert and oriented to person, place, and time.   Psychiatric:         Behavior: Behavior normal.         Results     CT scan of the chest from 6/29/2023 reviewed on PACS  Pulmonary nodules, stable for 2 years  Upper lobe paramediastinal fibrosis, unchanged  Stable 3 cm right thyroid nodule and cysts prior splenectomy    Immunization History   Administered Date(s) Administered    COVID-19 (PFIZER) BIVALENT 12+YRS 09/20/2022    COVID-19 (PFIZER) Purple Cap Monovalent 03/02/2021, 03/23/2021, 10/13/2021    COVID-19 (UNSPECIFIED) 10/13/2021    COVID-19 F23 (PFIZER) 12YRS+ (COMIRNATY) 10/06/2023    FLUAD TRI 65YR+ 10/01/2020    Fluzone High Dose =>65 Years ([employer] ONLY) 09/16/2016, 10/11/2017, 10/23/2018    Fluzone High-Dose 65+yrs 10/07/2022, 10/06/2023    Influenza, Unspecified 12/01/2017    Pneumococcal Conjugate 13-Valent (PCV13) 09/29/2017    Pneumococcal Polysaccharide (PPSV23) 12/01/2017     Problem List       ICD-10-CM ICD-9-CM   1. Stage IV (very severe) COPD  J44.9 496   2. Former smoker (Stopped 2010)  Z87.891 V15.82   3. H/O Hodgkin lymphoma (S/P chest XRT and splenectomy)  Z85.71 V10.72   4. Esophageal dysphagia  R13.19 787.29   5. Achalasia  K22.0 530.0   6. Nocturnal hypoxemia  G47.34 327.24   7. History of lung cancer (RLL Lobectomy 2000)  Z85.118 V10.11       Discussion     We reviewed her recent chest imaging which reveals chronic but no acute  or new intrathoracic findings    Unfortunately, given her severe stage IV chronic obstructive pulmonary disease, prior lobectomy and restrictive pulmonary physiology I think it is unlikely organ to be able to affect her breathing much    We discussed medications again  Oriented try to make sure she is getting the Brovana, budesonide, and albuterol via nebulizer  She would like to use the Spiriva HandiHaler and also would like an albuterol metered-dose inhaler, as well    We talked about pulmonary rehab  She would like to check with [facility] although I think the pulmonary rehab program closed several years ago  She does not think she can really do anything until the winter is over    I will plan to see her back in 6 months or earlier if there are any problems in the meantime  Would probably continue to recommend an annual CT scan of the chest    Moderate level of Medical Decision Making complexity based on 2 or more chronic stable illnesses and an independent review of test results and/or prescription drug management.    Anatoly Collier MD  Note electronically signed    CC: Lucho Peters MD

## 2023-10-20 RX ORDER — BUDESONIDE 0.5 MG/2ML
0.5 INHALANT ORAL 2 TIMES DAILY
Start: 2023-10-20

## 2023-10-20 RX ORDER — ARFORMOTEROL TARTRATE 15 UG/2ML
15 SOLUTION RESPIRATORY (INHALATION)
Start: 2023-10-20

## 2023-10-20 RX ORDER — ALBUTEROL SULFATE 2.5 MG/3ML
2.5 SOLUTION RESPIRATORY (INHALATION) 4 TIMES DAILY PRN
Start: 2023-10-20

## 2023-10-23 RX ORDER — LEVOCETIRIZINE DIHYDROCHLORIDE 5 MG/1
TABLET, FILM COATED ORAL
Qty: 90 TABLET | Refills: 3 | Status: SHIPPED | OUTPATIENT
Start: 2023-10-23

## 2023-10-24 DIAGNOSIS — J44.9 CHRONIC OBSTRUCTIVE PULMONARY DISEASE, UNSPECIFIED COPD TYPE: ICD-10-CM

## 2023-10-24 RX ORDER — MONTELUKAST SODIUM 10 MG/1
10 TABLET ORAL
Qty: 90 TABLET | Refills: 1 | Status: SHIPPED | OUTPATIENT
Start: 2023-10-24

## 2023-10-24 NOTE — TELEPHONE ENCOUNTER
Received fax today from [facility] requesting a refill for Singular. Refills have been sent in

## 2023-11-01 DIAGNOSIS — J44.9 CHRONIC OBSTRUCTIVE PULMONARY DISEASE, UNSPECIFIED COPD TYPE: Primary | ICD-10-CM

## 2023-11-01 RX ORDER — IPRATROPIUM BROMIDE AND ALBUTEROL SULFATE 2.5; .5 MG/3ML; MG/3ML
3 SOLUTION RESPIRATORY (INHALATION)
Start: 2023-11-01

## 2023-12-08 ENCOUNTER — OFFICE VISIT (OUTPATIENT)
Dept: ENDOCRINOLOGY | Facility: CLINIC | Age: 73
End: 2023-12-08
Payer: MEDICARE

## 2023-12-08 VITALS
HEIGHT: 64 IN | HEART RATE: 85 BPM | WEIGHT: 131 LBS | DIASTOLIC BLOOD PRESSURE: 64 MMHG | SYSTOLIC BLOOD PRESSURE: 116 MMHG | BODY MASS INDEX: 22.36 KG/M2 | OXYGEN SATURATION: 96 %

## 2023-12-08 DIAGNOSIS — E05.90 HYPERTHYROIDISM: Primary | ICD-10-CM

## 2023-12-08 LAB
T3FREE SERPL-MCNC: 4.66 PG/ML (ref 2–4.4)
T4 FREE SERPL-MCNC: 1.48 NG/DL (ref 0.93–1.7)
TSH SERPL DL<=0.05 MIU/L-ACNC: 0.07 UIU/ML (ref 0.27–4.2)

## 2023-12-08 PROCEDURE — 3044F HG A1C LEVEL LT 7.0%: CPT | Performed by: INTERNAL MEDICINE

## 2023-12-08 PROCEDURE — 1159F MED LIST DOCD IN RCRD: CPT | Performed by: INTERNAL MEDICINE

## 2023-12-08 PROCEDURE — 36415 COLL VENOUS BLD VENIPUNCTURE: CPT | Performed by: INTERNAL MEDICINE

## 2023-12-08 PROCEDURE — 84481 FREE ASSAY (FT-3): CPT | Performed by: INTERNAL MEDICINE

## 2023-12-08 PROCEDURE — 99214 OFFICE O/P EST MOD 30 MIN: CPT | Performed by: INTERNAL MEDICINE

## 2023-12-08 PROCEDURE — 84443 ASSAY THYROID STIM HORMONE: CPT | Performed by: INTERNAL MEDICINE

## 2023-12-08 PROCEDURE — 1160F RVW MEDS BY RX/DR IN RCRD: CPT | Performed by: INTERNAL MEDICINE

## 2023-12-08 PROCEDURE — 84439 ASSAY OF FREE THYROXINE: CPT | Performed by: INTERNAL MEDICINE

## 2023-12-08 NOTE — ASSESSMENT & PLAN NOTE
CLINICALLY HYPERTHYROID./WORSE  SHE IS UNDERSTANDABLY RELUCTANT TO RESUME METHIMAZOLE AS IT CAUSED HER TO BE HYPOTHYROID IN PAST. MAYBE 5 MG PER WEEK ?  WILL GET LABS AND REASSESS

## 2023-12-08 NOTE — PROGRESS NOTES
"     Office Note      Date: 2023  Patient Name: Ita Pickett  MRN: [id number]  : 1950    Chief Complaint   Patient presents with    Hyperthyroidism       History of Present Illness:   Ita Pickett is a 72 y.o. female who presents for Hyperthyroidism  .   Current rx: NONE - WE HAD TO STOP METHIMAZOLE BECAUSE SHE BECAME VERY HYPOTHYROID WHEN JUST ON 5 MG PER DAY.     Changes in history: NONE  Questions /problems: SHE NOTES SPLITTING OF HER NEAILS     Subjective          Review of Systems:   Review of Systems   Constitutional:  Negative for unexpected weight change.   HENT:  Positive for trouble swallowing.    Cardiovascular:  Negative for palpitations.   Endocrine: Positive for cold intolerance.   Skin:         NAILS ARE SPLITTING    Neurological:  Positive for tremors.   Psychiatric/Behavioral:  Positive for agitation and sleep disturbance. The patient is nervous/anxious.        The following portions of the patient's history were reviewed and updated as appropriate: allergies, current medications, past family history, past medical history, past social history, past surgical history, and problem list.    Objective     Visit Vitals  /64 (BP Location: Left arm, Patient Position: Sitting, Cuff Size: Adult)   Pulse 85   Ht 162.6 cm (64\")   Wt 59.4 kg (131 lb)   LMP  (LMP Unknown)   SpO2 96%   BMI 22.49 kg/m²           Physical Exam:  Physical Exam  Vitals reviewed.   Constitutional:       Appearance: Normal appearance.   Eyes:      Extraocular Movements: Extraocular movements intact.   Neck:      Comments: NO PALPABLE THYROID ABNORMALITY   Cardiovascular:      Rate and Rhythm: Normal rate.   Lymphadenopathy:      Cervical: No cervical adenopathy.   Neurological:      Mental Status: She is alert.      Comments: TREMOR   Psychiatric:         Mood and Affect: Mood normal.         Thought Content: Thought content normal.         Judgment: Judgment normal.         Assessment / Plan  "     Assessment & Plan:  Problem List Items Addressed This Visit          Other    Hyperthyroidism - Primary    Overview     RECURRENT FOLLOWING REMOTE PARTIAL THYROIDECTOMY  //  Hypothyroid when just on 5 mg of methimazole         Current Assessment & Plan     CLINICALLY HYPERTHYROID./WORSE  SHE IS UNDERSTANDABLY RELUCTANT TO RESUME METHIMAZOLE AS IT CAUSED HER TO BE HYPOTHYROID IN PAST. MAYBE 5 MG PER WEEK ?  WILL GET LABS AND REASSESS          Relevant Medications    metoprolol tartrate (LOPRESSOR) 50 MG tablet    predniSONE (DELTASONE) 10 MG tablet    Other Relevant Orders    TSH    T4, Free    T3, Free        Electronically signed by : Dae Godoy MD   12/08/2023

## 2023-12-11 ENCOUNTER — TELEPHONE (OUTPATIENT)
Dept: ENDOCRINOLOGY | Facility: CLINIC | Age: 73
End: 2023-12-11

## 2023-12-11 DIAGNOSIS — E05.90 HYPERTHYROIDISM: Primary | ICD-10-CM

## 2023-12-11 RX ORDER — METHIMAZOLE 5 MG/1
5 TABLET ORAL WEEKLY
Qty: 12 TABLET | Refills: 3 | Status: SHIPPED | OUTPATIENT
Start: 2023-12-11 | End: 2024-12-10

## 2023-12-11 NOTE — TELEPHONE ENCOUNTER
Please let her know that her labs do show he is more hyperthyroid. I recommend methimazole 5 mg per week. In past a low dose cause hypothyroidism so we need a tiny dose. Does she need a rx ?         Patient notified and verbalized understanding.  She states she does need new rx sent to [facility] in [place].  Also will have repeat labs done in 2 months at [facility] in [facility].

## 2023-12-28 DIAGNOSIS — J45.50 SEVERE PERSISTENT ASTHMA WITHOUT COMPLICATION: ICD-10-CM

## 2023-12-28 DIAGNOSIS — J44.9 COPD MIXED TYPE: ICD-10-CM

## 2023-12-28 RX ORDER — PREDNISONE 10 MG/1
10 TABLET ORAL DAILY
Qty: 90 TABLET | Refills: 1 | Status: SHIPPED | OUTPATIENT
Start: 2023-12-28

## 2024-01-04 DIAGNOSIS — J44.9 CHRONIC OBSTRUCTIVE PULMONARY DISEASE, UNSPECIFIED COPD TYPE: ICD-10-CM

## 2024-01-04 DIAGNOSIS — J47.9 BRONCHIECTASIS WITHOUT ACUTE EXACERBATION: ICD-10-CM

## 2024-01-04 RX ORDER — SODIUM CHLORIDE FOR INHALATION 7 %
VIAL, NEBULIZER (ML) INHALATION
Qty: 240 ML | Refills: 3 | Status: SHIPPED | OUTPATIENT
Start: 2024-01-04

## 2024-01-05 ENCOUNTER — TELEPHONE (OUTPATIENT)
Dept: PULMONOLOGY | Facility: CLINIC | Age: 74
End: 2024-01-05
Payer: MEDICARE

## 2024-01-05 NOTE — TELEPHONE ENCOUNTER
PA for sodium chloride 7% nebulizer solution completed per fax. Determination pending. Key is [id number]

## 2024-02-01 DIAGNOSIS — J44.9 COPD MIXED TYPE: Primary | ICD-10-CM

## 2024-05-01 DIAGNOSIS — J44.9 CHRONIC OBSTRUCTIVE PULMONARY DISEASE, UNSPECIFIED COPD TYPE: ICD-10-CM

## 2024-05-01 RX ORDER — MONTELUKAST SODIUM 10 MG/1
10 TABLET ORAL
Qty: 90 TABLET | Refills: 3 | Status: SHIPPED | OUTPATIENT
Start: 2024-05-01

## 2024-05-09 ENCOUNTER — OFFICE VISIT (OUTPATIENT)
Dept: PULMONOLOGY | Facility: CLINIC | Age: 74
End: 2024-05-09
Payer: MEDICARE

## 2024-05-09 VITALS
HEIGHT: 64 IN | SYSTOLIC BLOOD PRESSURE: 132 MMHG | DIASTOLIC BLOOD PRESSURE: 82 MMHG | OXYGEN SATURATION: 96 % | BODY MASS INDEX: 21.51 KG/M2 | HEART RATE: 105 BPM | WEIGHT: 126 LBS | TEMPERATURE: 96.8 F

## 2024-05-09 DIAGNOSIS — Z85.118 HISTORY OF LUNG CANCER: ICD-10-CM

## 2024-05-09 DIAGNOSIS — J44.9 COPD MIXED TYPE: ICD-10-CM

## 2024-05-09 DIAGNOSIS — Z87.891 FORMER SMOKER: Primary | ICD-10-CM

## 2024-05-09 DIAGNOSIS — G47.34 NOCTURNAL HYPOXEMIA: ICD-10-CM

## 2024-05-09 DIAGNOSIS — Z85.43 HISTORY OF OVARIAN CANCER: ICD-10-CM

## 2024-05-09 RX ORDER — DOXYCYCLINE HYCLATE 100 MG/1
100 CAPSULE ORAL 2 TIMES DAILY
Qty: 20 CAPSULE | Refills: 0 | Status: SHIPPED | OUTPATIENT
Start: 2024-05-09

## 2024-05-09 RX ORDER — PREDNISONE 10 MG/1
TABLET ORAL
Qty: 31 TABLET | Refills: 0 | Status: SHIPPED | OUTPATIENT
Start: 2024-05-09

## 2024-05-13 DIAGNOSIS — Z87.891 FORMER SMOKER: ICD-10-CM

## 2024-05-13 DIAGNOSIS — J44.9 CHRONIC OBSTRUCTIVE PULMONARY DISEASE, UNSPECIFIED COPD TYPE: ICD-10-CM

## 2024-05-13 DIAGNOSIS — Z85.118 HISTORY OF LUNG CANCER: Primary | ICD-10-CM

## 2024-05-23 ENCOUNTER — LAB (OUTPATIENT)
Dept: LAB | Facility: HOSPITAL | Age: 74
End: 2024-05-23
Payer: MEDICARE

## 2024-05-23 DIAGNOSIS — E05.90 HYPERTHYROIDISM: ICD-10-CM

## 2024-05-23 LAB — TSH SERPL DL<=0.05 MIU/L-ACNC: 0.18 UIU/ML (ref 0.27–4.2)

## 2024-05-23 PROCEDURE — 84443 ASSAY THYROID STIM HORMONE: CPT

## 2024-06-14 ENCOUNTER — OFFICE VISIT (OUTPATIENT)
Dept: ENDOCRINOLOGY | Facility: CLINIC | Age: 74
End: 2024-06-14
Payer: MEDICARE

## 2024-06-14 ENCOUNTER — HOSPITAL ENCOUNTER (OUTPATIENT)
Dept: CT IMAGING | Facility: HOSPITAL | Age: 74
Discharge: HOME | End: 2024-06-14
Payer: MEDICARE

## 2024-06-14 VITALS
BODY MASS INDEX: 22.53 KG/M2 | OXYGEN SATURATION: 93 % | SYSTOLIC BLOOD PRESSURE: 124 MMHG | WEIGHT: 132 LBS | DIASTOLIC BLOOD PRESSURE: 62 MMHG | HEART RATE: 100 BPM | HEIGHT: 64 IN

## 2024-06-14 DIAGNOSIS — Z85.118 HISTORY OF LUNG CANCER: ICD-10-CM

## 2024-06-14 DIAGNOSIS — J44.9 CHRONIC OBSTRUCTIVE PULMONARY DISEASE, UNSPECIFIED COPD TYPE: ICD-10-CM

## 2024-06-14 DIAGNOSIS — E05.90 HYPERTHYROIDISM: Primary | ICD-10-CM

## 2024-06-14 DIAGNOSIS — Z87.891 FORMER SMOKER: ICD-10-CM

## 2024-06-14 PROCEDURE — 99213 OFFICE O/P EST LOW 20 MIN: CPT | Performed by: INTERNAL MEDICINE

## 2024-06-14 PROCEDURE — 71250 CT THORAX DX C-: CPT

## 2024-06-14 NOTE — ASSESSMENT & PLAN NOTE
LOOKING AT THE TRAJECTORY OF HER LABS, I THINK WE NEED TO SIT TIGHT AND REPEAT LABS IN 2 MONTHS.   IMPROVED   THE PLAN IS TO RECHECK LABS IN 2 MONTHS AND THEN SEE HER BACK IN 6 MONTHS

## 2024-06-14 NOTE — PROGRESS NOTES
"     Office Note      Date: 2024  Patient Name: Ita Pickett  MRN: [id number]  : 1950    Chief Complaint   Patient presents with    Thyroid Problem     Hyperthyroidism         History of Present Illness:   Ita Pickett is a 73 y.o. female who presents for Thyroid Problem (Hyperthyroidism/)  .   Current rx: methimazole 5 mg PER WEEK (HIGHER DOSES MADE HER HYPOTHYROID)    RECENT TSH  0.176    Changes in history:PULMONARY REHAB  Questions /problems:NOTES HAIR AND NAIL CHANGES     Subjective          Review of Systems:   Review of Systems   HENT:  Positive for trouble swallowing and voice change.    Respiratory:  Positive for shortness of breath.    Cardiovascular:  Negative for palpitations.   Endocrine: Positive for heat intolerance.   Musculoskeletal:  Positive for neck pain.   Neurological:  Negative for tremors.   Psychiatric/Behavioral:  Positive for sleep disturbance.        The following portions of the patient's history were reviewed and updated as appropriate: allergies, current medications, past family history, past medical history, past social history, past surgical history, and problem list.    Objective     Visit Vitals  /62 (BP Location: Left arm, Patient Position: Sitting, Cuff Size: Adult)   Pulse 100   Ht 162.6 cm (64\")   Wt 59.9 kg (132 lb)   LMP  (LMP Unknown)   SpO2 93%   BMI 22.66 kg/m²           Physical Exam:  Physical Exam  Vitals reviewed.   Constitutional:       Appearance: Normal appearance.   Neck:      Comments: SMALL THYROID  Cardiovascular:      Rate and Rhythm: Tachycardia present.   Lymphadenopathy:      Cervical: No cervical adenopathy.   Neurological:      Mental Status: She is alert.   Psychiatric:         Mood and Affect: Mood normal.         Thought Content: Thought content normal.         Judgment: Judgment normal.         Assessment / Plan      Assessment & Plan:  Problem List Items Addressed This Visit       Hyperthyroidism - Primary    Overview    "  RECURRENT FOLLOWING REMOTE PARTIAL THYROIDECTOMY  //  Hypothyroid when just on 5 mg of methimazole         Current Assessment & Plan     LOOKING AT THE TRAJECTORY OF HER LABS, I THINK WE NEED TO SIT TIGHT AND REPEAT LABS IN 2 MONTHS.   IMPROVED   THE PLAN IS TO RECHECK LABS IN 2 MONTHS AND THEN SEE HER BACK IN 6 MONTHS          Relevant Medications    metoprolol tartrate (LOPRESSOR) 50 MG tablet    methIMAzole (TAPAZOLE) 5 MG tablet    predniSONE (DELTASONE) 10 MG tablet    Other Relevant Orders    TSH    T4, Free        Electronically signed by : Dae Godoy MD   06/14/2024

## 2024-06-19 DIAGNOSIS — J45.50 SEVERE PERSISTENT ASTHMA WITHOUT COMPLICATION: ICD-10-CM

## 2024-06-19 DIAGNOSIS — J44.9 COPD MIXED TYPE: ICD-10-CM

## 2024-06-19 RX ORDER — PREDNISONE 10 MG/1
10 TABLET ORAL DAILY
Qty: 90 TABLET | Refills: 1 | Status: SHIPPED | OUTPATIENT
Start: 2024-06-19

## 2024-06-24 ENCOUNTER — TELEPHONE (OUTPATIENT)
Dept: PULMONOLOGY | Facility: CLINIC | Age: 74
End: 2024-06-24
Payer: MEDICARE

## 2024-06-24 ENCOUNTER — DOCUMENTATION (OUTPATIENT)
Dept: PULMONOLOGY | Facility: CLINIC | Age: 74
End: 2024-06-24
Payer: MEDICARE

## 2024-06-24 NOTE — PROGRESS NOTES
Patient underwent a CT scan of the chest which I reviewed.  Nothing suspicious    I tried to medicate these results to the patient by phone but have only gotten voicemail.    I will ask my office staff to communicate with her and encouraged her to keep her August follow-up appointment

## 2024-06-24 NOTE — TELEPHONE ENCOUNTER
Spoke with pt notified of results, she verbalized good appreciation and understanding. She will keep appt in Aug.

## 2024-06-24 NOTE — TELEPHONE ENCOUNTER
----- Message from Anatoly Collier sent at 6/24/2024 12:36 PM EDT -----  Regarding: Results  Please call the patient let her know that her CAT scan is stable and encouraged her to keep her follow-up appointment in August    Thank you    RT

## 2024-08-14 ENCOUNTER — OFFICE VISIT (OUTPATIENT)
Dept: PULMONOLOGY | Facility: CLINIC | Age: 74
End: 2024-08-14
Payer: MEDICARE

## 2024-08-14 VITALS
DIASTOLIC BLOOD PRESSURE: 70 MMHG | HEART RATE: 67 BPM | SYSTOLIC BLOOD PRESSURE: 120 MMHG | TEMPERATURE: 97.3 F | BODY MASS INDEX: 21.94 KG/M2 | WEIGHT: 128.5 LBS | HEIGHT: 64 IN

## 2024-08-14 DIAGNOSIS — R06.09 DOE (DYSPNEA ON EXERTION): Primary | ICD-10-CM

## 2024-08-14 DIAGNOSIS — Z85.118 HISTORY OF LUNG CANCER: ICD-10-CM

## 2024-08-14 DIAGNOSIS — Z87.19 HISTORY OF ESOPHAGEAL STRICTURE: ICD-10-CM

## 2024-08-14 DIAGNOSIS — Z85.71 HISTORY OF HODGKIN'S LYMPHOMA: ICD-10-CM

## 2024-08-14 DIAGNOSIS — Z85.43 HISTORY OF OVARIAN CANCER: ICD-10-CM

## 2024-08-14 PROCEDURE — 99215 OFFICE O/P EST HI 40 MIN: CPT | Performed by: INTERNAL MEDICINE

## 2024-08-14 PROCEDURE — 1159F MED LIST DOCD IN RCRD: CPT | Performed by: INTERNAL MEDICINE

## 2024-08-14 PROCEDURE — 1160F RVW MEDS BY RX/DR IN RCRD: CPT | Performed by: INTERNAL MEDICINE

## 2024-08-14 RX ORDER — OXYCODONE HYDROCHLORIDE AND ACETAMINOPHEN 5; 325 MG/1; MG/1
TABLET ORAL
COMMUNITY
Start: 2024-06-24

## 2024-08-14 RX ORDER — COVID-19 ANTIGEN TEST
KIT MISCELLANEOUS
COMMUNITY

## 2024-08-14 NOTE — PROGRESS NOTES
PULMONARY  NOTE    Chief Complaint     Stage IV COPD, former smoker, history of lung cancer, prior right lower lobe lobectomy, history of Hodgkin's disease with prior radiation therapy, nocturnal hypoxemia, esophageal dysphagia with achalasia and stricture, prior PEG, prior esophageal dilation, hyperthyroidism    History of Present Illness     73-year-old female returns today for follow-up  I last saw her 5/9/2024    She has a history of tobacco abuse, resolved in 2010    She has stage IV, very severe, chronic obstructive pulmonary disease  She remains on Brovana, budesonide, and Spiriva  She has albuterol to use as needed  No recent acute exacerbation of respiratory symptoms  Previously on Nucala prescribed by Dr. Gage Collier in the past    She remains on low-dose prednisone  She has a history of Hodgkin's disease in the 1980s for which she received radiation therapy    She has a history of lung cancer for which she underwent a right lower lobe lobectomy      She continues to have esophageal dysphagia symptoms  She is scheduled to see GI and will probably undergo repeat dilation    She did complete pulmonary rehab in [place]    She has a right submandibular lymph node or gland that she feels is slowly gotten larger    Patient Active Problem List   Diagnosis    Achalasia    History of lung cancer (RLL Lobectomy 2000)    Breast cancer    History of esophageal stricture    Esophageal dysphagia    History of radiation therapy    Pain around PEG tube site    SVT (supraventricular tachycardia)    Pericardial effusion    Acquired gastric fistula    Gastrocutaneous fistula    Iron deficiency anemia    Thrombocytosis    Aspiration into airway    Adenomatous polyp of colon    Hyperthyroidism    Benign esophageal stricture    History of ovarian cancer (Prior XRT 1975)    Nocturnal hypoxemia    Former smoker (Stopped 2010)    ZHONG (dyspnea on exertion)    Stage IV (very severe) COPD    H/O Hodgkin lymphoma (S/P chest XRT  and splenectomy)    Pre-diabetes      No Known Allergies    Current Outpatient Medications:     albuterol (PROVENTIL) (2.5 MG/3ML) 0.083% nebulizer solution, Take 2.5 mg by nebulization 4 (Four) Times a Day As Needed for Wheezing., Disp: , Rfl:     arformoterol (BROVANA) 15 MCG/2ML nebulizer solution, Take 2 mL by nebulization 2 (Two) Times a Day., Disp: , Rfl:     budesonide (Pulmicort) 0.5 MG/2ML nebulizer solution, Take 2 mL by nebulization 2 (Two) Times a Day., Disp: 60 each, Rfl: 11    fluticasone (Flonase) 50 MCG/ACT nasal spray, 2 sprays by Each Nare route 2 (Two) Times a Day., Disp: 18.2 mL, Rfl: 11    levocetirizine (XYZAL) 5 MG tablet, TAKE 1 TABLET EVERY EVENING, Disp: 90 tablet, Rfl: 3    Mepolizumab 100 MG/ML solution auto-injector, Inject 1 mL under the skin into the appropriate area as directed Every 28 (Twenty-Eight) Days., Disp: 1 mL, Rfl: 11    methIMAzole (TAPAZOLE) 5 MG tablet, Take 1 tablet by mouth 1 (One) Time Per Week., Disp: 12 tablet, Rfl: 3    metoprolol tartrate (LOPRESSOR) 50 MG tablet, Take 1 tablet by mouth 2 (Two) Times a Day., Disp: , Rfl:     montelukast (SINGULAIR) 10 MG tablet, TAKE 1 TABLET AT BEDTIME, Disp: 90 tablet, Rfl: 3    multivitamin with minerals tablet tablet, Take 1 tablet by mouth Daily., Disp: , Rfl:     Naproxen Sodium (Aleve) 220 MG capsule, Take  by mouth., Disp: , Rfl:     O2 (OXYGEN), Inhale 2 L/min Every Night., Disp: , Rfl:     omeprazole (priLOSEC) 40 MG capsule, , Disp: , Rfl:     oxyCODONE-acetaminophen (PERCOCET) 5-325 MG per tablet, , Disp: , Rfl:     predniSONE (DELTASONE) 10 MG tablet, TAKE 1 TABLET BY MOUTH DAILY, Disp: 90 tablet, Rfl: 1    sodium chloride 7 % nebulizer solution nebulizer solution, INHALE ONE VIAL VIA NEBULIZER TWO TIMES A DAY AS NEEDED FOR CONGESTION, Disp: 240 mL, Rfl: 3    tiotropium bromide monohydrate (Spiriva Respimat) 2.5 MCG/ACT aerosol solution inhaler, Inhale 2 puffs Daily., Disp: 1 inhaler, Rfl: 11    vitamin C (ASCORBIC  "ACID) 500 MG tablet, Take 1 tablet by mouth 2 (Two) Times a Day., Disp: , Rfl:   MEDICATION LIST AND ALLERGIES REVIEWED.    Family History   Problem Relation Age of Onset    Cancer Mother     Lung cancer Mother     Heart disease Father     Heart disease Brother     Breast cancer Maternal Grandmother     Colon polyps Sister     Colon cancer Neg Hx      Social History     Tobacco Use    Smoking status: Former     Current packs/day: 0.00     Average packs/day: 3.0 packs/day for 40.0 years (120.0 ttl pk-yrs)     Types: Cigarettes     Start date: 5/10/1970     Quit date: 5/10/2010     Years since quittin.2    Smokeless tobacco: Never   Vaping Use    Vaping status: Never Used   Substance Use Topics    Alcohol use: Yes     Comment: Socially     Drug use: Yes     Types: Marijuana     Comment: giorgi rivera     Social History     Social History Narrative    Not on file     FAMILY AND SOCIAL HISTORY REVIEWED.    Review of Systems  IF PRESENT REFER TO SCANNED ROS SHEET FROM SAME DATE  OTHERWISE ROS OBTAINED AND NON-CONTRIBUTORY OVER HPI.    /70   Pulse 67   Temp 97.3 °F (36.3 °C)   Ht 162.6 cm (64.02\")   Wt 58.3 kg (128 lb 8 oz)   LMP  (LMP Unknown)   BMI 22.05 kg/m²   Physical Exam  Vitals and nursing note reviewed.   Constitutional:       General: She is not in acute distress.     Appearance: She is well-developed. She is not diaphoretic.   HENT:      Head: Normocephalic and atraumatic.   Neck:      Thyroid: No thyromegaly.   Cardiovascular:      Rate and Rhythm: Normal rate and regular rhythm.      Heart sounds: Normal heart sounds. No murmur heard.  Pulmonary:      Effort: Pulmonary effort is normal.      Breath sounds: Normal breath sounds. No stridor.   Lymphadenopathy:      Cervical: No cervical adenopathy.      Upper Body:      Right upper body: No supraclavicular or epitrochlear adenopathy.      Left upper body: No supraclavicular or epitrochlear adenopathy.   Skin:     General: Skin is warm and dry. "   Neurological:      Mental Status: She is alert and oriented to person, place, and time.   Psychiatric:         Behavior: Behavior normal.         Results     We reviewed her CT scan of the chest from 6/18/2024  No suspicious intrathoracic findings    Immunization History   Administered Date(s) Administered    ABRYSVO (RSV, 60+ or pregnant women 32-36 wks) 12/01/2023    COVID-19 (PFIZER) BIVALENT 12+YRS 09/20/2022    COVID-19 (PFIZER) Purple Cap Monovalent 03/02/2021, 03/23/2021, 10/13/2021    COVID-19 (UNSPECIFIED) 10/13/2021    COVID-19 F23 (PFIZER) 12YRS+ (COMIRNATY) 10/06/2023    FLUAD TRI 65YR+ 10/01/2020    Fluzone High-Dose 65+YRS 09/16/2016, 10/11/2017, 10/23/2018    Fluzone High-Dose 65+yrs 10/07/2022, 10/06/2023    Influenza, Unspecified 12/01/2017    Pneumococcal Conjugate 13-Valent (PCV13) 09/29/2017    Pneumococcal Polysaccharide (PPSV23) 12/01/2017     Problem List       ICD-10-CM ICD-9-CM   1. ZHONG (dyspnea on exertion)  R06.09 786.09   2. H/O Hodgkin lymphoma (S/P chest XRT and splenectomy)  Z85.71 V10.72   3. History of esophageal stricture  Z87.19 V12.79   4. History of lung cancer (RLL Lobectomy 2000)  Z85.118 V10.11   5. History of ovarian cancer (Prior XRT 1975)  Z85.43 V10.43       Discussion     We reviewed her CT scan which is stable and nothing suspicious    Lungs are clear today    I am not sure why she is so profoundly fatigued  It may be related to hyperthyroidism but her TSH has not really that low    I do not have any other labs; she might just be anemic    She does have a firm but mobile right submandibular lymph node  This does need to be evaluated  Will refer her to ENT in [place], Dr. Pace    She is can to remain on the same bronchodilator regimen and I will see her back in a couple of months for follow-up    At the very least we will get a follow-up CT scan of the chest in June 2025    Level of service justified based on 42 minutes spent in patient care on this date of service  including, but not limited to: preparing to see the patient, obtaining and/or reviewing history, performing medically appropriate examination, ordering tests/medicine/procedures, independently interpreting results, documenting clinical information in EHR, and counseling/education of patient/family/caregiver (excluding time spent on other separate services such as performing procedures or test interpretation, if applicable). (Level 4 30-39 minutes; Level 5 40-54 minutes)    Anatoly Collier MD  Note electronically signed    CC: Lucho Peters MD

## 2024-08-15 DIAGNOSIS — R13.19 ESOPHAGEAL DYSPHAGIA: Primary | ICD-10-CM

## 2024-08-22 ENCOUNTER — OUTSIDE FACILITY SERVICE (OUTPATIENT)
Dept: GASTROENTEROLOGY | Facility: CLINIC | Age: 74
End: 2024-08-22
Payer: MEDICARE

## 2024-08-22 PROCEDURE — 43249 ESOPH EGD DILATION <30 MM: CPT | Performed by: INTERNAL MEDICINE

## 2024-11-27 ENCOUNTER — OFFICE VISIT (OUTPATIENT)
Dept: PULMONOLOGY | Facility: CLINIC | Age: 74
End: 2024-11-27
Payer: MEDICARE

## 2024-11-27 VITALS
OXYGEN SATURATION: 96 % | SYSTOLIC BLOOD PRESSURE: 114 MMHG | HEIGHT: 64 IN | DIASTOLIC BLOOD PRESSURE: 80 MMHG | WEIGHT: 132 LBS | BODY MASS INDEX: 22.53 KG/M2 | HEART RATE: 94 BPM

## 2024-11-27 DIAGNOSIS — R13.19 ESOPHAGEAL DYSPHAGIA: ICD-10-CM

## 2024-11-27 DIAGNOSIS — J44.9 COPD MIXED TYPE: ICD-10-CM

## 2024-11-27 DIAGNOSIS — Z85.71 HISTORY OF HODGKIN'S LYMPHOMA: ICD-10-CM

## 2024-11-27 DIAGNOSIS — R06.09 DOE (DYSPNEA ON EXERTION): Primary | ICD-10-CM

## 2024-11-27 DIAGNOSIS — Z85.118 HISTORY OF LUNG CANCER: ICD-10-CM

## 2024-11-27 DIAGNOSIS — Z87.19 HISTORY OF ESOPHAGEAL STRICTURE: ICD-10-CM

## 2024-11-27 DIAGNOSIS — Z87.891 FORMER SMOKER: ICD-10-CM

## 2024-11-27 RX ORDER — TOBRAMYCIN 3 MG/ML
SOLUTION/ DROPS OPHTHALMIC
COMMUNITY
Start: 2024-11-26

## 2024-11-27 NOTE — PROGRESS NOTES
PULMONARY  NOTE    Chief Complaint     Stage IV COPD, former smoker, history of lung cancer, prior right lower lobe lobectomy, history of Hodgkin's disease with prior radiation therapy, nocturnal hypoxemia, esophageal dysphagia with achalasia and stricture, prior PEG, prior esophageal dilation, hypothyroidism, submandibular nodule    History of Present Illness     73-year-old female returns today for follow-up  I last saw her 8/14/2024    She has a history of tobacco abuse, resolved in 2010    She has stage IV, very severe, chronic obstructive pulmonary disease  She has been on Brovana, budesonide, and Spiriva as well as albuterol as needed  No recent acute exacerbation of respiratory symptoms  Also previously on Nucala as prescribed by Dr. Gage Collier    She remains on low-dose prednisone  She has a history of Hodgkin's disease in the 1980s for which she received radiation therapy    Also has a history of lung cancer for which she underwent a right lower lobe lobectomy  Most recent CT scan of the chest in June 2024 revealed no evidence of recurrent disease or suspicious intrathoracic findings    She has a lot of esophageal issues as previously noted    She had a submandibular gland or nodule on the right which she felt had gotten larger  I had recommended ENT evaluation and had referred her to Dr. Pace  She has not seen ENT yet    She remains profoundly fatigued, the etiology of which is unclear    Patient Active Problem List   Diagnosis    Achalasia    History of lung cancer (RLL Lobectomy 2000)    Breast cancer    History of esophageal stricture    Esophageal dysphagia    History of radiation therapy    Pain around PEG tube site    SVT (supraventricular tachycardia)    Pericardial effusion    Acquired gastric fistula    Gastrocutaneous fistula    Iron deficiency anemia    Thrombocytosis    Aspiration into airway    Adenomatous polyp of colon    Hyperthyroidism    Benign esophageal stricture    History of  ovarian cancer (Prior XRT 1975)    Nocturnal hypoxemia    Former smoker (Stopped 2010)    ZHONG (dyspnea on exertion)    Stage IV (very severe) COPD    H/O Hodgkin lymphoma (S/P chest XRT and splenectomy)    Pre-diabetes      No Known Allergies    Current Outpatient Medications:     albuterol (PROVENTIL) (2.5 MG/3ML) 0.083% nebulizer solution, Take 2.5 mg by nebulization 4 (Four) Times a Day As Needed for Wheezing., Disp: , Rfl:     arformoterol (BROVANA) 15 MCG/2ML nebulizer solution, Take 2 mL by nebulization 2 (Two) Times a Day., Disp: , Rfl:     budesonide (Pulmicort) 0.5 MG/2ML nebulizer solution, Take 2 mL by nebulization 2 (Two) Times a Day., Disp: 60 each, Rfl: 11    levocetirizine (XYZAL) 5 MG tablet, TAKE 1 TABLET EVERY EVENING, Disp: 90 tablet, Rfl: 3    Mepolizumab 100 MG/ML solution auto-injector, Inject 1 mL under the skin into the appropriate area as directed Every 28 (Twenty-Eight) Days., Disp: 1 mL, Rfl: 11    methIMAzole (TAPAZOLE) 5 MG tablet, Take 1 tablet by mouth 1 (One) Time Per Week., Disp: 12 tablet, Rfl: 3    metoprolol tartrate (LOPRESSOR) 50 MG tablet, Take 1 tablet by mouth 2 (Two) Times a Day., Disp: , Rfl:     montelukast (SINGULAIR) 10 MG tablet, TAKE 1 TABLET AT BEDTIME, Disp: 90 tablet, Rfl: 3    multivitamin with minerals tablet tablet, Take 1 tablet by mouth Daily., Disp: , Rfl:     O2 (OXYGEN), Inhale 2 L/min Every Night., Disp: , Rfl:     omeprazole (priLOSEC) 40 MG capsule, , Disp: , Rfl:     oxyCODONE-acetaminophen (PERCOCET) 5-325 MG per tablet, , Disp: , Rfl:     predniSONE (DELTASONE) 10 MG tablet, TAKE 1 TABLET BY MOUTH DAILY, Disp: 90 tablet, Rfl: 1    sodium chloride 7 % nebulizer solution nebulizer solution, INHALE ONE VIAL VIA NEBULIZER TWO TIMES A DAY AS NEEDED FOR CONGESTION, Disp: 240 mL, Rfl: 3    tiotropium bromide monohydrate (Spiriva Respimat) 2.5 MCG/ACT aerosol solution inhaler, Inhale 2 puffs Daily., Disp: 1 inhaler, Rfl: 11    tobramycin 0.3 % solution  "ophthalmic solution, , Disp: , Rfl:     fluticasone (Flonase) 50 MCG/ACT nasal spray, 2 sprays by Each Nare route 2 (Two) Times a Day. (Patient not taking: Reported on 2024), Disp: 18.2 mL, Rfl: 11    Naproxen Sodium (Aleve) 220 MG capsule, Take  by mouth. (Patient not taking: Reported on 2024), Disp: , Rfl:     vitamin C (ASCORBIC ACID) 500 MG tablet, Take 1 tablet by mouth 2 (Two) Times a Day. (Patient not taking: Reported on 2024), Disp: , Rfl:   MEDICATION LIST AND ALLERGIES REVIEWED.    Family History   Problem Relation Age of Onset    Cancer Mother     Lung cancer Mother     Heart disease Father     Heart disease Brother     Breast cancer Maternal Grandmother     Colon polyps Sister     Colon cancer Neg Hx      Social History     Tobacco Use    Smoking status: Former     Current packs/day: 0.00     Average packs/day: 3.0 packs/day for 40.0 years (120.0 ttl pk-yrs)     Types: Cigarettes     Start date: 5/10/1970     Quit date: 5/10/2010     Years since quittin.5    Smokeless tobacco: Never   Vaping Use    Vaping status: Never Used   Substance Use Topics    Alcohol use: Yes     Comment: Socially     Drug use: Yes     Types: Marijuana     Comment: giorgi rivera     Social History     Social History Narrative    Not on file     FAMILY AND SOCIAL HISTORY REVIEWED.    Review of Systems  IF PRESENT REFER TO SCANNED ROS SHEET FROM SAME DATE  OTHERWISE ROS OBTAINED AND NON-CONTRIBUTORY OVER HPI.    /80   Pulse 94   Ht 162.6 cm (64\")   Wt 59.9 kg (132 lb)   LMP  (LMP Unknown)   SpO2 96%   BMI 22.66 kg/m²   Physical Exam  Vitals and nursing note reviewed.   Constitutional:       General: She is not in acute distress.     Appearance: She is well-developed. She is not diaphoretic.   HENT:      Head: Normocephalic and atraumatic.   Neck:      Thyroid: No thyromegaly.   Cardiovascular:      Rate and Rhythm: Normal rate and regular rhythm.      Heart sounds: Normal heart sounds. No murmur " heard.  Pulmonary:      Effort: Pulmonary effort is normal.      Breath sounds: Normal breath sounds. No stridor.   Lymphadenopathy:      Upper Body:      Right upper body: No supraclavicular or epitrochlear adenopathy.      Left upper body: No supraclavicular or epitrochlear adenopathy.   Skin:     General: Skin is warm and dry.   Neurological:      Mental Status: She is alert and oriented to person, place, and time.   Psychiatric:         Behavior: Behavior normal.         Results     Immunization History   Administered Date(s) Administered    ABRYSVO (RSV, 60+ or pregnant women 32-36 wks) 12/01/2023    COVID-19 (PFIZER) 12YRS+ (COMIRNATY) 10/06/2023, 09/20/2024    COVID-19 (PFIZER) BIVALENT 12+YRS 09/20/2022    COVID-19 (PFIZER) Purple Cap Monovalent 03/02/2021, 03/23/2021, 10/13/2021    COVID-19 (UNSPECIFIED) 10/13/2021    FLUAD TRI 65YR+ 10/01/2020    Fluzone High-Dose 65+YRS 09/16/2016, 10/11/2017, 10/23/2018    Fluzone High-Dose 65+yrs 10/07/2022, 10/06/2023    Influenza, Unspecified 12/01/2017    Pneumococcal Conjugate 13-Valent (PCV13) 09/29/2017    Pneumococcal Polysaccharide (PPSV23) 12/01/2017     Problem List       ICD-10-CM ICD-9-CM   1. ZHONG (dyspnea on exertion)  R06.09 786.09   2. Esophageal dysphagia  R13.19 787.29   3. Former smoker (Stopped 2010)  Z87.891 V15.82   4. H/O Hodgkin lymphoma (S/P chest XRT and splenectomy)  Z85.71 V10.72   5. History of esophageal stricture  Z87.19 V12.79   6. History of lung cancer (RLL Lobectomy 2000)  Z85.118 V10.11   7. Stage IV (very severe) COPD  J44.9 496       Discussion     She remains profoundly fatigued  The etiology of which is unclear    She is going to remain on the same regimen for her stage IV COPD    Have encouraged her to continue to follow-up with her regular physicians    She has deferred ENT evaluation of her submandibular nodule at this time    I will plan to see her back in June with a repeat LDCT for lung cancer screening    Moderate level of  Medical Decision Making complexity based on 2 or more chronic stable illnesses and an independent review of test results and/or prescription drug management.    Anatoly Collier MD  Note electronically signed    CC: Lucho Peters MD

## 2024-12-02 DIAGNOSIS — Z87.891 FORMER SMOKER: Primary | ICD-10-CM

## 2024-12-02 DIAGNOSIS — Z85.118 HISTORY OF LUNG CANCER: ICD-10-CM

## 2024-12-02 DIAGNOSIS — R06.09 DOE (DYSPNEA ON EXERTION): ICD-10-CM

## 2024-12-18 DIAGNOSIS — J44.9 COPD MIXED TYPE: ICD-10-CM

## 2024-12-18 DIAGNOSIS — J45.50 SEVERE PERSISTENT ASTHMA WITHOUT COMPLICATION: ICD-10-CM

## 2024-12-18 RX ORDER — PREDNISONE 10 MG/1
10 TABLET ORAL DAILY
Qty: 90 TABLET | Refills: 1 | Status: SHIPPED | OUTPATIENT
Start: 2024-12-18

## 2024-12-26 RX ORDER — LEVOCETIRIZINE DIHYDROCHLORIDE 5 MG/1
TABLET, FILM COATED ORAL
Qty: 90 TABLET | Refills: 3 | Status: SHIPPED | OUTPATIENT
Start: 2024-12-26

## 2025-03-12 ENCOUNTER — TELEPHONE (OUTPATIENT)
Dept: PULMONOLOGY | Facility: CLINIC | Age: 75
End: 2025-03-12
Payer: MEDICARE

## 2025-03-12 RX ORDER — PREDNISONE 10 MG/1
TABLET ORAL
Qty: 31 TABLET | Refills: 0 | Status: SHIPPED | OUTPATIENT
Start: 2025-03-12

## 2025-03-12 RX ORDER — DOXYCYCLINE 100 MG/1
100 CAPSULE ORAL 2 TIMES DAILY
Qty: 20 CAPSULE | Refills: 0 | Status: SHIPPED | OUTPATIENT
Start: 2025-03-12

## 2025-03-12 NOTE — TELEPHONE ENCOUNTER
Pt called today c/o cough w/ sputum/wheezing/sob for 3 days and requesting abx/steroids needing to be sent to [facility]. Pt denies fever/chest pain/nausea/swelling.

## 2025-04-24 DIAGNOSIS — J44.9 CHRONIC OBSTRUCTIVE PULMONARY DISEASE, UNSPECIFIED COPD TYPE: ICD-10-CM

## 2025-04-24 RX ORDER — TIOTROPIUM BROMIDE INHALATION SPRAY 3.12 UG/1
2 SPRAY, METERED RESPIRATORY (INHALATION)
Qty: 2 EACH | Refills: 0 | COMMUNITY
Start: 2025-04-24

## 2025-04-24 RX ORDER — ALBUTEROL SULFATE 90 UG/1
2 INHALANT RESPIRATORY (INHALATION) EVERY 4 HOURS PRN
Qty: 18 G | Refills: 5 | Status: SHIPPED | OUTPATIENT
Start: 2025-04-24

## 2025-04-24 NOTE — TELEPHONE ENCOUNTER
Refilled Albuterol HFA per chart via fax sent to [facility] per pt's request. Samples of Spiriva Respimat ready for  also. Pt verbalized appreciation.

## 2025-05-13 DIAGNOSIS — J44.9 CHRONIC OBSTRUCTIVE PULMONARY DISEASE, UNSPECIFIED COPD TYPE: ICD-10-CM

## 2025-05-13 RX ORDER — MONTELUKAST SODIUM 10 MG/1
10 TABLET ORAL
Qty: 90 TABLET | Refills: 3 | Status: SHIPPED | OUTPATIENT
Start: 2025-05-13

## 2025-05-15 ENCOUNTER — TELEPHONE (OUTPATIENT)
Age: 75
End: 2025-05-15
Payer: MEDICARE

## 2025-05-15 RX ORDER — METHIMAZOLE 5 MG/1
5 TABLET ORAL WEEKLY
Qty: 12 TABLET | Refills: 1 | OUTPATIENT
Start: 2025-05-15 | End: 2026-05-15

## 2025-05-15 NOTE — TELEPHONE ENCOUNTER
Spoke w/ patient. She is scheduled to see [facility] on 6/3.    PROVIDER:[TOKEN:[[id number]]]

## 2025-05-15 NOTE — TELEPHONE ENCOUNTER
ATTEMPTED TO CALL PATIENT TO LET HER KNOW I HAD SENT DR. VILLALOBOS HER REFILL OF METHIMIZOLE AND TO ALSO GET HER TO RESCHEDULE HER APPOINTMENT.  THERE WAS NO ANSWER AND NO VOICEMAIL.

## 2025-05-15 NOTE — TELEPHONE ENCOUNTER
PATIENT IS CALLING BACK STATING SHE IS NOT ABLE TO MAKE THE APPOINTMENT WE JUST SCHEDULED FOR HER. SHE NEEDS TO RESCHEDULE HER APPOINTMENT WITH DR. VILLALOBOS AS SHE NEEDS TO GET REFILLS ON MED AS SHE IS OUT OF MEDICATION. PHONE NUMBER [phone]

## 2025-05-15 NOTE — TELEPHONE ENCOUNTER
Rx Refill Note  Requested Prescriptions     Pending Prescriptions Disp Refills    methIMAzole (TAPAZOLE) 5 MG tablet 12 tablet 1     Sig: Take 1 tablet by mouth 1 (One) Time Per Week.      Last office visit with prescribing clinician: 6/14/2024      Next office visit with prescribing clinician: 5/23/2025   PATIENT NEEDS TO SCHEDULE A FOLLOW UP IF NOT ABLE TO MAKE THIS APPOINTMENT.     Mindi Doran MA  05/15/25, 10:05 EDT

## 2025-06-03 ENCOUNTER — HOSPITAL ENCOUNTER (OUTPATIENT)
Dept: CT IMAGING | Facility: HOSPITAL | Age: 75
Discharge: HOME | End: 2025-06-03
Admitting: INTERNAL MEDICINE
Payer: MEDICARE

## 2025-06-03 ENCOUNTER — OFFICE VISIT (OUTPATIENT)
Age: 75
End: 2025-06-03
Payer: MEDICARE

## 2025-06-03 VITALS
WEIGHT: 120.9 LBS | SYSTOLIC BLOOD PRESSURE: 148 MMHG | HEART RATE: 101 BPM | OXYGEN SATURATION: 96 % | BODY MASS INDEX: 20.64 KG/M2 | HEIGHT: 64 IN | DIASTOLIC BLOOD PRESSURE: 72 MMHG

## 2025-06-03 DIAGNOSIS — E05.90 HYPERTHYROIDISM: Primary | ICD-10-CM

## 2025-06-03 DIAGNOSIS — R06.09 DOE (DYSPNEA ON EXERTION): ICD-10-CM

## 2025-06-03 DIAGNOSIS — Z87.891 FORMER SMOKER: ICD-10-CM

## 2025-06-03 DIAGNOSIS — Z85.118 HISTORY OF LUNG CANCER: ICD-10-CM

## 2025-06-03 PROCEDURE — 99213 OFFICE O/P EST LOW 20 MIN: CPT | Performed by: PHYSICIAN ASSISTANT

## 2025-06-03 PROCEDURE — 71250 CT THORAX DX C-: CPT

## 2025-06-03 RX ORDER — HYDROCODONE BITARTRATE AND ACETAMINOPHEN 7.5; 325 MG/15ML; MG/15ML
SOLUTION ORAL AS NEEDED
COMMUNITY
Start: 2025-04-21

## 2025-06-03 RX ORDER — METHIMAZOLE 5 MG/1
5 TABLET ORAL WEEKLY
Qty: 12 TABLET | Refills: 3 | Status: SHIPPED | OUTPATIENT
Start: 2025-06-03 | End: 2026-06-03

## 2025-06-03 NOTE — PROGRESS NOTES
"     Office Note      Date: 6/3/2025  Patient Name: Ita Pickett  MRN: [id number]  : 1950    Chief Complaint   Patient presents with    Hyperthyroidism       History of Present Illness:   Ita Pickett is a 74 y.o. female who presents for Hyperthyroidism  .   Current rx: methimazole 5 mg PER WEEK (HIGHER DOSES MADE HER HYPOTHYROID) (hasn't had in a few months because she lost it?)     No recent tsh    Changes in history: sick in 3/2025 and required antibiotics for lung infection. Hasn't had methimazole  recently because she couldn't find her medication for the last few months  Questions /problems:NOTES HAIR AND NAIL CHANGES     Subjective          Review of Systems:   Review of Systems   HENT:  Positive for trouble swallowing and voice change.    Respiratory:  Positive for shortness of breath.    Cardiovascular:  Negative for palpitations.   Endocrine: Positive for heat intolerance.   Musculoskeletal:  Positive for neck pain.   Neurological:  Positive for tremors.   Psychiatric/Behavioral:  Positive for sleep disturbance.        The following portions of the patient's history were reviewed and updated as appropriate: allergies, current medications, past family history, past medical history, past social history, past surgical history, and problem list.    Objective     Visit Vitals  /72 (BP Location: Left arm, Patient Position: Sitting)   Pulse 101   Ht 162.6 cm (64\")   Wt 54.8 kg (120 lb 14.4 oz)   LMP  (LMP Unknown)   SpO2 96%   BMI 20.75 kg/m²           Physical Exam:  Physical Exam  Vitals reviewed.   Constitutional:       Appearance: Normal appearance.   Neck:      Comments: SMALL THYROID  Cardiovascular:      Rate and Rhythm: Tachycardia present.   Lymphadenopathy:      Cervical: No cervical adenopathy.   Neurological:      Mental Status: She is alert.   Psychiatric:         Mood and Affect: Mood normal.         Thought Content: Thought content normal.         Judgment: Judgment normal. "         Assessment / Plan      Assessment & Plan:  Problem List Items Addressed This Visit       Hyperthyroidism - Primary    Overview   RECURRENT FOLLOWING REMOTE PARTIAL THYROIDECTOMY  //  Hypothyroid when just on 5 mg of methimazole         Current Assessment & Plan   Patient to resume methimazole 5 mg weekly and have repeat labs in 6 weeks at her local hospital  Will make recommendations based on findings           Relevant Medications    metoprolol tartrate (LOPRESSOR) 50 MG tablet    methIMAzole (TAPAZOLE) 5 MG tablet    predniSONE (DELTASONE) 10 MG tablet    predniSONE (DELTASONE) 10 MG tablet    Other Relevant Orders    CBC (No Diff)    Comprehensive Metabolic Panel    T4, Free    TSH          Electronically signed by : Swetha Lawrence PA-C   06/03/2025

## 2025-06-03 NOTE — ASSESSMENT & PLAN NOTE
Patient to resume methimazole 5 mg weekly and have repeat labs in 6 weeks at her local hospital  Will make recommendations based on findings

## 2025-06-07 ENCOUNTER — DOCUMENTATION (OUTPATIENT)
Dept: PULMONOLOGY | Facility: CLINIC | Age: 75
End: 2025-06-07
Payer: MEDICARE

## 2025-06-07 NOTE — PROGRESS NOTES
The patient underwent a CT scan of the chest which I have reviewed on PACS.  She has postoperative changes but no new or suspicious intrathoracic findings    I communicated these results to the patient on the phone    She has had persistent bronchitis symptoms and I have suggested that she be evaluated in the office.  I will send my office staff note and I have asked her to call the office on Monday to get an appointment

## 2025-06-07 NOTE — Clinical Note
Can we get this patient a follow-up appointment with one of the APRN's for persistent bronchitis symptoms.  Thank you.

## 2025-06-11 ENCOUNTER — TELEPHONE (OUTPATIENT)
Dept: PULMONOLOGY | Facility: CLINIC | Age: 75
End: 2025-06-11
Payer: MEDICARE

## 2025-06-11 NOTE — TELEPHONE ENCOUNTER
----- Message from Anatoly Collier sent at 6/7/2025 11:53 AM EDT -----  Can we get this patient a follow-up appointment with one of the APRN's for persistent bronchitis symptoms.  Thank you.

## 2025-06-24 ENCOUNTER — OUTSIDE FACILITY SERVICE (OUTPATIENT)
Dept: GASTROENTEROLOGY | Facility: CLINIC | Age: 75
End: 2025-06-24
Payer: MEDICARE

## 2025-06-24 PROCEDURE — 43239 EGD BIOPSY SINGLE/MULTIPLE: CPT | Performed by: INTERNAL MEDICINE

## 2025-06-24 PROCEDURE — 88305 TISSUE EXAM BY PATHOLOGIST: CPT | Performed by: INTERNAL MEDICINE

## 2025-06-24 PROCEDURE — 43249 ESOPH EGD DILATION <30 MM: CPT | Performed by: INTERNAL MEDICINE

## 2025-06-25 ENCOUNTER — LAB REQUISITION (OUTPATIENT)
Dept: LAB | Facility: HOSPITAL | Age: 75
End: 2025-06-25
Payer: MEDICARE

## 2025-06-25 DIAGNOSIS — K44.9 DIAPHRAGMATIC HERNIA WITHOUT OBSTRUCTION OR GANGRENE: ICD-10-CM

## 2025-06-25 DIAGNOSIS — R13.10 DYSPHAGIA, UNSPECIFIED: ICD-10-CM

## 2025-06-25 DIAGNOSIS — K22.2 ESOPHAGEAL OBSTRUCTION: ICD-10-CM

## 2025-06-26 LAB — REF LAB TEST METHOD: NORMAL

## 2025-07-01 DIAGNOSIS — J45.50 SEVERE PERSISTENT ASTHMA WITHOUT COMPLICATION: ICD-10-CM

## 2025-07-01 DIAGNOSIS — J44.9 COPD MIXED TYPE: ICD-10-CM

## 2025-07-01 RX ORDER — PREDNISONE 10 MG/1
10 TABLET ORAL DAILY
Qty: 90 TABLET | Refills: 1 | Status: SHIPPED | OUTPATIENT
Start: 2025-07-01

## 2025-07-06 ENCOUNTER — RESULTS FOLLOW-UP (OUTPATIENT)
Dept: LAB | Facility: HOSPITAL | Age: 75
End: 2025-07-06
Payer: MEDICARE

## 2025-07-06 NOTE — TELEPHONE ENCOUNTER
July 6, 2025    Ita Pickett  [address] [address]        Dear Ms. Pickett:    This letter is to review the biopsy report from your June 24, 2025 upper endoscopy.    Biopsies from the esophagus returned unremarkable.  More specifically, there was no evidence of Mcintosh's esophagus/intestinal metaplasia esophagus nor any evidence of eosinophilic esophagitis or malignancy.  We were able to dilate your esophagus to the largest diameter available, 20 mm, with a through-the-scope balloon.    As you know, you are also due for your routine colon cancer screening colonoscopy and, if this has not already been scheduled please call the office so this can be scheduled at your convenience.    It was good to see you.  Please make sure to reach out to me with any questions or concerns you may have.    Sincerely,  Juan Mcintosh MD    CC: Lucho Peters MD

## 2025-07-06 NOTE — LETTER
July 7, 2025    Ita Pickett  [address] [address]        Dear Ms. Pickett:    This letter is to review the biopsy report from your June 24, 2025 upper endoscopy.    Biopsies from the esophagus returned unremarkable.  More specifically, there was no evidence of Mcintosh's esophagus/intestinal metaplasia esophagus nor any evidence of eosinophilic esophagitis or malignancy.  We were able to dilate your esophagus to the largest diameter available, 20 mm, with a through-the-scope balloon.    As you know, you are also due for your routine colon cancer screening colonoscopy and, if this has not already been scheduled please call the office so this can be scheduled at your convenience.    It was good to see you.  Please make sure to reach out to me with any questions or concerns you may have.    Sincerely,  Juan Mcintosh MD    CC: Lucho Peters MD

## 2025-07-07 ENCOUNTER — TELEPHONE (OUTPATIENT)
Dept: GASTROENTEROLOGY | Facility: CLINIC | Age: 75
End: 2025-07-07
Payer: MEDICARE

## 2025-07-18 ENCOUNTER — OFFICE VISIT (OUTPATIENT)
Dept: PULMONOLOGY | Facility: CLINIC | Age: 75
End: 2025-07-18
Payer: MEDICARE

## 2025-07-18 VITALS
SYSTOLIC BLOOD PRESSURE: 140 MMHG | OXYGEN SATURATION: 97 % | BODY MASS INDEX: 19.99 KG/M2 | TEMPERATURE: 95.2 F | HEIGHT: 64 IN | WEIGHT: 117.1 LBS | HEART RATE: 102 BPM | DIASTOLIC BLOOD PRESSURE: 60 MMHG

## 2025-07-18 DIAGNOSIS — J44.9 CHRONIC OBSTRUCTIVE PULMONARY DISEASE, UNSPECIFIED COPD TYPE: ICD-10-CM

## 2025-07-18 DIAGNOSIS — J96.11 CHRONIC RESPIRATORY FAILURE WITH HYPOXIA: ICD-10-CM

## 2025-07-18 DIAGNOSIS — E05.90 HYPERTHYROIDISM: ICD-10-CM

## 2025-07-18 DIAGNOSIS — J44.9 COPD MIXED TYPE: Primary | ICD-10-CM

## 2025-07-18 DIAGNOSIS — G47.34 NOCTURNAL HYPOXEMIA: ICD-10-CM

## 2025-07-18 LAB
DEPRECATED RDW RBC AUTO: 43.2 FL (ref 37–54)
ERYTHROCYTE [DISTWIDTH] IN BLOOD BY AUTOMATED COUNT: 13.6 % (ref 12.3–15.4)
HCT VFR BLD AUTO: 36.1 % (ref 34–46.6)
HGB BLD-MCNC: 11.7 G/DL (ref 12–15.9)
MCH RBC QN AUTO: 28.5 PG (ref 26.6–33)
MCHC RBC AUTO-ENTMCNC: 32.4 G/DL (ref 31.5–35.7)
MCV RBC AUTO: 87.8 FL (ref 79–97)
PLATELET # BLD AUTO: 515 10*3/MM3 (ref 140–450)
PMV BLD AUTO: 11 FL (ref 6–12)
RBC # BLD AUTO: 4.11 10*6/MM3 (ref 3.77–5.28)
WBC NRBC COR # BLD AUTO: 13.06 10*3/MM3 (ref 3.4–10.8)

## 2025-07-18 PROCEDURE — 84439 ASSAY OF FREE THYROXINE: CPT | Performed by: PHYSICIAN ASSISTANT

## 2025-07-18 PROCEDURE — 85027 COMPLETE CBC AUTOMATED: CPT | Performed by: PHYSICIAN ASSISTANT

## 2025-07-18 PROCEDURE — 84443 ASSAY THYROID STIM HORMONE: CPT | Performed by: PHYSICIAN ASSISTANT

## 2025-07-18 PROCEDURE — 80053 COMPREHEN METABOLIC PANEL: CPT | Performed by: PHYSICIAN ASSISTANT

## 2025-07-18 RX ORDER — PREDNISONE 10 MG/1
TABLET ORAL
Qty: 31 TABLET | Refills: 0 | Status: SHIPPED | OUTPATIENT
Start: 2025-07-18

## 2025-07-18 RX ORDER — MELOXICAM 10 MG/1
10 CAPSULE ORAL DAILY
Qty: 30 CAPSULE | Refills: 0 | Status: SHIPPED | OUTPATIENT
Start: 2025-07-18

## 2025-07-18 RX ORDER — TIOTROPIUM BROMIDE INHALATION SPRAY 3.12 UG/1
2 SPRAY, METERED RESPIRATORY (INHALATION)
Qty: 2 EACH | Refills: 0 | COMMUNITY
Start: 2025-07-18

## 2025-07-18 NOTE — PROGRESS NOTES
"[facility] Pulmonary Follow up      Chief Complaint  ZHONG (dyspnea on exertion)    Subjective          Ita Pickett presents to [facility] for follow-up of some worsening shortness of breath.    For a couple months she has had a recurrent episode of bronchitis and has been on 3 rounds of antibiotics as well as a prednisone taper.  She now feels much more short of breath with activity.  She is having to stop frequently just walking through her house to catch her breath.    She has been using her Brovana and budesonide nebulizers usually once a day on some days of the week she ends up doing it twice a day.  She has also been using her saline nebulizers.  She says she is not having much production with her cough and does not really feel that congested.  She finds it hard to try to get all of those nebulizers and they.    She denies any chest pain chest pressure or palpitations.  She denies any edema or fluid retention.      Objective     Vital Signs:   /60   Pulse 102   Temp 95.2 °F (35.1 °C) (Temporal)   Ht 162.6 cm (64\")   Wt 53.1 kg (117 lb 1.6 oz)   SpO2 97% Comment: Room air at rest  BMI 20.10 kg/m²       Immunization History   Administered Date(s) Administered    ABRYSVO (RSV, 60+ or pregnant women 32-36 wks) 12/01/2023    COVID-19 (PFIZER) 12YRS+ (COMIRNATY) 10/06/2023, 09/20/2024    COVID-19 (PFIZER) BIVALENT 12+YRS 09/20/2022    COVID-19 (PFIZER) Purple Cap Monovalent 03/02/2021, 03/23/2021, 10/13/2021    COVID-19 (UNSPECIFIED) 10/13/2021    FLUAD TRI 65YR+ 10/01/2020    Fluzone High-Dose 65+YRS 09/16/2016, 10/11/2017, 10/23/2018, 09/20/2024    Fluzone High-Dose 65+yrs 10/07/2022, 10/06/2023    Influenza, Unspecified 12/01/2017    Pneumococcal Conjugate 13-Valent (PCV13) 09/29/2017    Pneumococcal Polysaccharide (PPSV23) 12/01/2017       Physical Exam  Vitals reviewed.   Constitutional:       General: She is not in acute distress.     " Appearance: She is well-developed.   HENT:      Head: Normocephalic and atraumatic.   Eyes:      Pupils: Pupils are equal, round, and reactive to light.   Cardiovascular:      Rate and Rhythm: Normal rate and regular rhythm.      Heart sounds: No murmur heard.  Pulmonary:      Effort: Pulmonary effort is normal. No respiratory distress.      Breath sounds: No wheezing or rales.      Comments: Clear but decreased  Abdominal:      General: Bowel sounds are normal. There is no distension.      Palpations: Abdomen is soft.   Musculoskeletal:         General: Normal range of motion.      Cervical back: Normal range of motion and neck supple.   Skin:     General: Skin is warm and dry.      Findings: No erythema.   Neurological:      Mental Status: She is alert and oriented to person, place, and time.   Psychiatric:         Behavior: Behavior normal.          Result Review :            6-minute walk test done in the office today:  She did desaturate to 85% on room air after 1-1/2 laps, she required 2 L pulsed dose.  Her sats with activity were 94%.    She was able to walk to 150 feet, 17% predicted.                 Assessment and Plan    Problem List Items Addressed This Visit          Endocrine and Metabolic    Hyperthyroidism    Overview   RECURRENT FOLLOWING REMOTE PARTIAL THYROIDECTOMY  //  Hypothyroid when just on 5 mg of methimazole         Relevant Medications    predniSONE (DELTASONE) 10 MG tablet       Pulmonary and Pneumonias    Stage IV (very severe) COPD - Primary    Relevant Medications    predniSONE (DELTASONE) 10 MG tablet    Chronic respiratory failure with hypoxia    Relevant Orders    Oxygen Therapy       Sleep    Nocturnal hypoxemia       Mrs. Pickett comes in today for worsening shortness of breath.  Unfortunately she has been quite ill for several weeks now.  I will go ahead and give her another prednisone taper since she still sounds quite tight.  She is not having much cough or sputum production, I do  not think she needs another antibiotic.    I did encourage her to try to use her budesonide and Brovana nebulizers twice daily with her Spiriva daily.  She can change the saline nebulizers to as needed with any cough or congestion.    We went over her 6-minute walk test.  She does require oxygen with activity for exertional hypoxemia.  Will go ahead and start her on 2 L pulsed dose through her DME, [employer]    Follow back up here in the office via telephone next week if she has not improved.  She is due labs with her endocrinologist, we will go ahead and get those drawn for her today.      Follow Up     No follow-ups on file.  Patient was given instructions and counseling regarding her condition or for health maintenance advice. Please see specific information pulled into the AVS if appropriate.       Moderate level of Medical Decision Making complexity based on 2 or more chronic stable illnesses and prescription drug management.    EDWARDO Eric, ACNP  [facility] Pulmonary Critical Care Medicine  Electronically signed

## 2025-07-19 LAB
ALBUMIN SERPL-MCNC: 3.9 G/DL (ref 3.5–5.2)
ALBUMIN/GLOB SERPL: 1 G/DL
ALP SERPL-CCNC: 88 U/L (ref 39–117)
ALT SERPL W P-5'-P-CCNC: 11 U/L (ref 1–33)
ANION GAP SERPL CALCULATED.3IONS-SCNC: 12 MMOL/L (ref 5–15)
AST SERPL-CCNC: 21 U/L (ref 1–32)
BILIRUB SERPL-MCNC: 0.3 MG/DL (ref 0–1.2)
BUN SERPL-MCNC: 20 MG/DL (ref 8–23)
BUN/CREAT SERPL: 18.3 (ref 7–25)
CALCIUM SPEC-SCNC: 9.7 MG/DL (ref 8.6–10.5)
CHLORIDE SERPL-SCNC: 100 MMOL/L (ref 98–107)
CO2 SERPL-SCNC: 26 MMOL/L (ref 22–29)
CREAT SERPL-MCNC: 1.09 MG/DL (ref 0.57–1)
EGFRCR SERPLBLD CKD-EPI 2021: 53.1 ML/MIN/1.73
GLOBULIN UR ELPH-MCNC: 4 GM/DL
GLUCOSE SERPL-MCNC: 140 MG/DL (ref 65–99)
POTASSIUM SERPL-SCNC: 4.9 MMOL/L (ref 3.5–5.2)
PROT SERPL-MCNC: 7.9 G/DL (ref 6–8.5)
SODIUM SERPL-SCNC: 138 MMOL/L (ref 136–145)
T4 FREE SERPL-MCNC: 1.25 NG/DL (ref 0.92–1.68)
TSH SERPL DL<=0.05 MIU/L-ACNC: 0.3 UIU/ML (ref 0.27–4.2)

## (undated) DEVICE — Device: Brand: AIR/WATER CHANNEL CLEANING ADAPTER

## (undated) DEVICE — GW JAG STR .035IN 450CM

## (undated) DEVICE — THE BITE BLOCK MAXI, LATEX FREE STRAP IS USED TO PROTECT THE ENDOSCOPE INSERTION TUBE FROM BEING BITTEN BY THE PATIENT.

## (undated) DEVICE — SNAR POLYP SENSATION JUMBO OVL 30 240X20

## (undated) DEVICE — CONTN GRAD MEAS TRIANG 32OZ BLK

## (undated) DEVICE — BALN DIL ELATION FIX WR 7.5F 180CM 18X19X20MM 1P/U

## (undated) DEVICE — ENDOGATOR HYBRID TUBING KIT FOR USE WITH ENDOGATOR IRRIGATION PUMP, OLYMPUS PUMP, GI4000 ESU, AND TORRENT IRRIGATION PUMP.: Brand: ENDOGATOR KIT

## (undated) DEVICE — TUBING,OXYGEN,CRUSH RES,7',CLEAR,UC: Brand: MEDLINE INDUSTRIES, INC.

## (undated) DEVICE — SYR LUERLOK 50ML

## (undated) DEVICE — CONMED COLONOSCOPE SHEATHED CYTOLOGY BRUSH, RING HANDLE, 3 MM X 230 CM: Brand: CONMED

## (undated) DEVICE — "MH-438 A/W VLVE F/140 EVIS-140": Brand: AIR/WATER VALVE

## (undated) DEVICE — "MH-443 SUCTION VALVE F/EVIS140 EVIS160": Brand: SUCTION VALVE

## (undated) DEVICE — FIAPC® PROBE W/ FILTER 3000 A OD 2.3MM/6.9FR; L 3M/9.8FT: Brand: ERBE

## (undated) DEVICE — ERBE NESSY®PLATE 170 SPLIT; 168CM²; CABLE 3M: Brand: ERBE

## (undated) DEVICE — MULTIPLE BAND LIGATOR: Brand: SPEEDBAND SUPERVIEW SUPER 7

## (undated) DEVICE — DEV INFL ALLIANCE2 SYS

## (undated) DEVICE — GW JAG STR .025IN 450CM